# Patient Record
Sex: FEMALE | Race: WHITE | NOT HISPANIC OR LATINO | Employment: STUDENT | URBAN - METROPOLITAN AREA
[De-identification: names, ages, dates, MRNs, and addresses within clinical notes are randomized per-mention and may not be internally consistent; named-entity substitution may affect disease eponyms.]

---
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
## 2020-01-07 ENCOUNTER — OFFICE VISIT - HEALTHEAST (OUTPATIENT)
Dept: FAMILY MEDICINE | Facility: CLINIC | Age: 20
End: 2020-01-07

## 2020-01-07 DIAGNOSIS — Z30.017 ENCOUNTER FOR INITIAL PRESCRIPTION OF IMPLANTABLE SUBDERMAL CONTRACEPTIVE: ICD-10-CM

## 2020-01-08 ENCOUNTER — OFFICE VISIT - HEALTHEAST (OUTPATIENT)
Dept: FAMILY MEDICINE | Facility: CLINIC | Age: 20
End: 2020-01-08

## 2020-01-08 DIAGNOSIS — Z30.017 NEXPLANON INSERTION: ICD-10-CM

## 2020-01-08 LAB — HCG UR QL: NEGATIVE

## 2020-03-23 ENCOUNTER — VIRTUAL VISIT (OUTPATIENT)
Dept: FAMILY MEDICINE | Facility: OTHER | Age: 20
End: 2020-03-23

## 2020-03-23 NOTE — PROGRESS NOTES
"Date: 2020 10:54:13  Clinician: Yomi Gould  Clinician NPI: [id number]  Patient: Na De León  Patient : 2000  Patient Address: [address] [place], [place] [id number]  Patient Phone: [phone]  Visit Protocol: URI  Patient Summary:  Na is a 19 year old ( : 2000 ) female who initiated a Visit for cold, sinus infection, or influenza. When asked the question \"Please sign me up to receive news, health information and promotions from [employer].\", Na responded \"Yes\".    Na states her symptoms started suddenly 2-3 weeks ago. After her symptoms started, they improved and then got worse again.   Her symptoms consist of enlarged lymph nodes, a sore throat, a cough, malaise, and a headache.   Symptom details     Cough: Na coughs every 5-10 minutes and her cough is not more bothersome at night. Phlegm comes into her throat when she coughs. She does not believe her cough is caused by post-nasal drip. The color of the phlegm is clear.     Sore throat: Na reports having moderate throat pain (4-6 on a 10 point pain scale), does not have exudate on her tonsils, and can swallow liquids. The lymph nodes in her neck are enlarged. A rash has not appeared on the skin since the sore throat started.     Headache: She states the headache is moderate (4-6 on a 10 point pain scale).      Na denies having ear pain, rhinitis, facial pain or pressure, myalgias, wheezing, nasal congestion, chills, teeth pain, and fever. She also denies having a sinus infection within the past year, taking antibiotic medication for the symptoms, and having recent facial or sinus surgery in the past 60 days. She is not experiencing dyspnea.   Precipitating events  Na is not sure if she has been exposed to someone with strep throat. She has not recently been exposed to someone with influenza. Na has been in close contact with the following high risk individuals: immunocompromised people.   Pertinent " COVID-19 (Coronavirus) information  Na has not traveled internationally or to the areas where COVID-19 (Coronavirus) is widespread, including cruise ship travel in the last 14 days before the start of her symptoms.   Na has not had a close contact with a laboratory-confirmed COVID-19 patient within 14 days of symptom onset. She also has not had a close contact with a suspected COVID-19 patient within 14 days of symptom onset.   Na is not a healthcare worker and does not work in a healthcare facility.   Pertinent medical history  Na does not get yeast infections when she takes antibiotics.   Na does not need a return to work/school note.   Weight: 120 lbs   Na does not smoke or use smokeless tobacco.   She denies pregnancy and denies breastfeeding. She does not menstruate.   Weight: 120 lbs    MEDICATIONS: spironolactone-hydrochlorothiazide oral, butalbital-acetaminophen-caffeine-codeine oral, ALLERGIES: NKDA  Clinician Response:  Dear Na,   Based on the information you have provided, you do have symptoms that are consistent with Coronavirus (COVID-19).   The coronavirus causes mild to severe respiratory illness with the most common symptoms including fever, cough and difficulty breathing. Unfortunately, many viruses cause similar symptoms and it can be difficult to distinguish between viruses, especially in mild cases, so we are presuming that anyone with cough or fever has coronavirus at this time.  Coronavirus/COVID-19 has reached the point of community spread in [place], meaning that we are finding the virus in people with no known exposure risk for lina the virus. Given the increasing commonness of coronavirus in the community we are no longer testing patients who are not critically ill.  If you are a health care worker, you should refer to your employee health office for instructions about testing and returning to work.  For everyone else who has cough or fever, you should  assume you are infected with coronavirus. Since you will not be tested but have symptoms that may be consistent with coronavirus, the CDC recommends you stay in self-isolation until these three things have happened:    You have had no fever for at least 72 hours (that is three full days of no fever without the use of medicine that reduces fevers)    AND   Other symptoms have improved (for example, when your cough or shortness of breath have improved)   AND   At least 7 days have passed since your symptoms first appeared.   How to Isolate:    Isolate yourself at home.   Do Not allow any visitors  Do Not go to work or school  Do Not go to [employer],  centers, shopping, or other public places.  Do Not shake hands.  Avoid close contact with others (hugging, kissing).   Protect Others:    Cover Your Mouth and Nose with a mask, disposable tissue or wash cloth to avoid spreading germs to others.  Wash your hands and face frequently with soap and water.   Managing Symptoms:    At this time, we primarily recommend Tylenol (Acetaminophen) for fever or pain. If you have liver or kidney problems, contact your primary care provider for instructions on use of tylenol. Adults can take 650 mg (two 325 mg pills) by mouth every 4-6 hours as needed OR 1,000 mg (two 500 mg pills) every 8 hours as needed. MAXIMUM DAILY DOSE: 3,000mg. For children, refer to dosing on bottle based on age or weight.   If you develop significant shortness of breath that prevents you from doing normal activities, please call 911 or proceed to the nearest emergency room and alert them immediately that you have been in self-isolation for possible coronavirus.   For more information about COVID19 and options for caring for yourself at home, please visit the CDC website at https://www.cdc.gov/coronavirus/2019-ncov/about/steps-when-sick.htmlFor more options for care at [facility], please visit our website at  [email or web address]     Diagnosis: Cough  Diagnosis ICD: R05

## 2020-04-13 ENCOUNTER — COMMUNICATION - HEALTHEAST (OUTPATIENT)
Dept: PEDIATRICS | Facility: CLINIC | Age: 20
End: 2020-04-13

## 2020-04-17 ENCOUNTER — OFFICE VISIT - HEALTHEAST (OUTPATIENT)
Dept: PEDIATRICS | Facility: CLINIC | Age: 20
End: 2020-04-17

## 2020-04-17 DIAGNOSIS — R05.9 COUGH: ICD-10-CM

## 2020-04-30 ENCOUNTER — OFFICE VISIT - HEALTHEAST (OUTPATIENT)
Dept: PEDIATRICS | Facility: CLINIC | Age: 20
End: 2020-04-30

## 2020-04-30 ENCOUNTER — COMMUNICATION - HEALTHEAST (OUTPATIENT)
Dept: SCHEDULING | Facility: CLINIC | Age: 20
End: 2020-04-30

## 2020-04-30 DIAGNOSIS — R09.81 NASAL CONGESTION: ICD-10-CM

## 2020-04-30 DIAGNOSIS — R05.3 PERSISTENT COUGH IN PEDIATRIC PATIENT: ICD-10-CM

## 2020-05-11 ENCOUNTER — OFFICE VISIT - HEALTHEAST (OUTPATIENT)
Dept: FAMILY MEDICINE | Facility: CLINIC | Age: 20
End: 2020-05-11

## 2020-05-11 DIAGNOSIS — G43.009 MIGRAINE WITHOUT AURA AND WITHOUT STATUS MIGRAINOSUS, NOT INTRACTABLE: ICD-10-CM

## 2020-05-15 ENCOUNTER — COMMUNICATION - HEALTHEAST (OUTPATIENT)
Dept: PEDIATRICS | Facility: CLINIC | Age: 20
End: 2020-05-15

## 2020-05-15 DIAGNOSIS — Z13.9 SCREENING FOR CONDITION: ICD-10-CM

## 2020-05-18 ENCOUNTER — AMBULATORY - HEALTHEAST (OUTPATIENT)
Dept: LAB | Facility: CLINIC | Age: 20
End: 2020-05-18

## 2020-05-18 DIAGNOSIS — Z13.9 SCREENING FOR CONDITION: ICD-10-CM

## 2020-05-22 LAB
GAMMA INTERFERON BACKGROUND BLD IA-ACNC: 0.04 IU/ML
M TB IFN-G BLD-IMP: NEGATIVE
MITOGEN IGNF BCKGRD COR BLD-ACNC: 0 IU/ML
MITOGEN IGNF BCKGRD COR BLD-ACNC: 0.02 IU/ML
QTF INTERPRETATION: NORMAL
QTF MITOGEN - NIL: 6.97 IU/ML

## 2020-07-06 ENCOUNTER — OFFICE VISIT - HEALTHEAST (OUTPATIENT)
Dept: FAMILY MEDICINE | Facility: CLINIC | Age: 20
End: 2020-07-06

## 2020-07-06 DIAGNOSIS — Z00.00 HEALTHCARE MAINTENANCE: ICD-10-CM

## 2020-07-06 DIAGNOSIS — Z11.3 ROUTINE SCREENING FOR STI (SEXUALLY TRANSMITTED INFECTION): ICD-10-CM

## 2020-07-06 DIAGNOSIS — Z79.899 MEDICATION MANAGEMENT: ICD-10-CM

## 2020-07-06 LAB
ALBUMIN SERPL-MCNC: 4.1 G/DL (ref 3.5–5)
ALP SERPL-CCNC: 46 U/L (ref 45–120)
ALT SERPL W P-5'-P-CCNC: 23 U/L (ref 0–45)
ANION GAP SERPL CALCULATED.3IONS-SCNC: 11 MMOL/L (ref 5–18)
AST SERPL W P-5'-P-CCNC: 25 U/L (ref 0–40)
BILIRUB SERPL-MCNC: 0.5 MG/DL (ref 0–1)
BUN SERPL-MCNC: 10 MG/DL (ref 8–22)
CALCIUM SERPL-MCNC: 9.5 MG/DL (ref 8.5–10.5)
CHLORIDE BLD-SCNC: 107 MMOL/L (ref 98–107)
CO2 SERPL-SCNC: 23 MMOL/L (ref 22–31)
CREAT SERPL-MCNC: 0.89 MG/DL (ref 0.6–1.1)
GFR SERPL CREATININE-BSD FRML MDRD: >60 ML/MIN/1.73M2
GLUCOSE BLD-MCNC: 90 MG/DL (ref 70–125)
HGB BLD-MCNC: 13.7 G/DL (ref 12–16)
HIV 1+2 AB+HIV1 P24 AG SERPL QL IA: NEGATIVE
POTASSIUM BLD-SCNC: 4.4 MMOL/L (ref 3.5–5)
PROT SERPL-MCNC: 6.6 G/DL (ref 6–8)
SODIUM SERPL-SCNC: 141 MMOL/L (ref 136–145)

## 2020-07-06 ASSESSMENT — MIFFLIN-ST. JEOR: SCORE: 1309.52

## 2020-07-07 LAB — T PALLIDUM AB SER QL: NEGATIVE

## 2020-07-31 ENCOUNTER — COMMUNICATION - HEALTHEAST (OUTPATIENT)
Dept: PEDIATRICS | Facility: CLINIC | Age: 20
End: 2020-07-31

## 2020-09-24 ENCOUNTER — OFFICE VISIT - HEALTHEAST (OUTPATIENT)
Dept: FAMILY MEDICINE | Facility: CLINIC | Age: 20
End: 2020-09-24

## 2020-09-24 DIAGNOSIS — F41.9 ANXIETY: ICD-10-CM

## 2020-09-24 ASSESSMENT — ANXIETY QUESTIONNAIRES
2. NOT BEING ABLE TO STOP OR CONTROL WORRYING: SEVERAL DAYS
3. WORRYING TOO MUCH ABOUT DIFFERENT THINGS: SEVERAL DAYS
7. FEELING AFRAID AS IF SOMETHING AWFUL MIGHT HAPPEN: NOT AT ALL
6. BECOMING EASILY ANNOYED OR IRRITABLE: SEVERAL DAYS
4. TROUBLE RELAXING: MORE THAN HALF THE DAYS
5. BEING SO RESTLESS THAT IT IS HARD TO SIT STILL: NOT AT ALL
1. FEELING NERVOUS, ANXIOUS, OR ON EDGE: NEARLY EVERY DAY
GAD7 TOTAL SCORE: 8

## 2020-09-24 ASSESSMENT — PATIENT HEALTH QUESTIONNAIRE - PHQ9: SUM OF ALL RESPONSES TO PHQ QUESTIONS 1-9: 1

## 2020-09-25 ENCOUNTER — COMMUNICATION - HEALTHEAST (OUTPATIENT)
Dept: FAMILY MEDICINE | Facility: CLINIC | Age: 20
End: 2020-09-25

## 2020-10-06 ENCOUNTER — OFFICE VISIT - HEALTHEAST (OUTPATIENT)
Dept: FAMILY MEDICINE | Facility: CLINIC | Age: 20
End: 2020-10-06

## 2020-10-06 DIAGNOSIS — F41.9 ANXIETY: ICD-10-CM

## 2020-10-09 ENCOUNTER — COMMUNICATION - HEALTHEAST (OUTPATIENT)
Dept: FAMILY MEDICINE | Facility: CLINIC | Age: 20
End: 2020-10-09

## 2020-10-09 ENCOUNTER — AMBULATORY - HEALTHEAST (OUTPATIENT)
Dept: FAMILY MEDICINE | Facility: CLINIC | Age: 20
End: 2020-10-09

## 2020-10-09 DIAGNOSIS — R11.0 NAUSEA: ICD-10-CM

## 2020-10-09 RX ORDER — ONDANSETRON 4 MG/1
4 TABLET, ORALLY DISINTEGRATING ORAL EVERY 8 HOURS PRN
Qty: 30 TABLET | Refills: 0 | Status: SHIPPED | OUTPATIENT
Start: 2020-10-09 | End: 2022-03-02

## 2020-10-29 ENCOUNTER — COMMUNICATION - HEALTHEAST (OUTPATIENT)
Dept: FAMILY MEDICINE | Facility: CLINIC | Age: 20
End: 2020-10-29

## 2020-11-11 ENCOUNTER — COMMUNICATION - HEALTHEAST (OUTPATIENT)
Dept: FAMILY MEDICINE | Facility: CLINIC | Age: 20
End: 2020-11-11

## 2021-02-26 ENCOUNTER — COMMUNICATION - HEALTHEAST (OUTPATIENT)
Dept: FAMILY MEDICINE | Facility: CLINIC | Age: 21
End: 2021-02-26

## 2021-03-09 ENCOUNTER — OFFICE VISIT - HEALTHEAST (OUTPATIENT)
Dept: FAMILY MEDICINE | Facility: CLINIC | Age: 21
End: 2021-03-09

## 2021-03-09 DIAGNOSIS — R45.89 SYMPTOMS OF DEPRESSION: ICD-10-CM

## 2021-03-09 DIAGNOSIS — F41.9 ANXIETY: ICD-10-CM

## 2021-03-09 ASSESSMENT — ANXIETY QUESTIONNAIRES
7. FEELING AFRAID AS IF SOMETHING AWFUL MIGHT HAPPEN: NOT AT ALL
1. FEELING NERVOUS, ANXIOUS, OR ON EDGE: NOT AT ALL
4. TROUBLE RELAXING: NOT AT ALL
5. BEING SO RESTLESS THAT IT IS HARD TO SIT STILL: NOT AT ALL
6. BECOMING EASILY ANNOYED OR IRRITABLE: NOT AT ALL
GAD7 TOTAL SCORE: 1
3. WORRYING TOO MUCH ABOUT DIFFERENT THINGS: NOT AT ALL
2. NOT BEING ABLE TO STOP OR CONTROL WORRYING: SEVERAL DAYS

## 2021-03-09 ASSESSMENT — PATIENT HEALTH QUESTIONNAIRE - PHQ9: SUM OF ALL RESPONSES TO PHQ QUESTIONS 1-9: 5

## 2021-03-22 ENCOUNTER — COMMUNICATION - HEALTHEAST (OUTPATIENT)
Dept: PEDIATRICS | Facility: CLINIC | Age: 21
End: 2021-03-22

## 2021-03-22 DIAGNOSIS — G44.209 TENSION-TYPE HEADACHE, NOT INTRACTABLE, UNSPECIFIED CHRONICITY PATTERN: ICD-10-CM

## 2021-03-23 RX ORDER — BUTALBITAL, ACETAMINOPHEN AND CAFFEINE 50; 325; 40 MG/1; MG/1; MG/1
TABLET ORAL
Qty: 60 TABLET | Refills: 1 | Status: SHIPPED | OUTPATIENT
Start: 2021-03-23 | End: 2022-01-08

## 2021-03-31 ENCOUNTER — COMMUNICATION - HEALTHEAST (OUTPATIENT)
Dept: FAMILY MEDICINE | Facility: CLINIC | Age: 21
End: 2021-03-31

## 2021-03-31 ENCOUNTER — RECORDS - HEALTHEAST (OUTPATIENT)
Dept: ADMINISTRATIVE | Facility: OTHER | Age: 21
End: 2021-03-31

## 2021-03-31 DIAGNOSIS — F41.9 ANXIETY: ICD-10-CM

## 2021-03-31 DIAGNOSIS — R45.89 SYMPTOMS OF DEPRESSION: ICD-10-CM

## 2021-04-12 ENCOUNTER — COMMUNICATION - HEALTHEAST (OUTPATIENT)
Dept: FAMILY MEDICINE | Facility: CLINIC | Age: 21
End: 2021-04-12

## 2021-04-12 ENCOUNTER — OFFICE VISIT - HEALTHEAST (OUTPATIENT)
Dept: FAMILY MEDICINE | Facility: CLINIC | Age: 21
End: 2021-04-12

## 2021-04-12 DIAGNOSIS — F41.9 ANXIETY: ICD-10-CM

## 2021-04-12 DIAGNOSIS — R45.89 SYMPTOMS OF DEPRESSION: ICD-10-CM

## 2021-04-12 RX ORDER — AMITRIPTYLINE HYDROCHLORIDE 10 MG/1
TABLET ORAL
Status: SHIPPED | COMMUNITY
Start: 2021-04-06 | End: 2022-03-02

## 2021-04-13 RX ORDER — BUPROPION HYDROCHLORIDE 150 MG/1
150 TABLET ORAL DAILY
Qty: 90 TABLET | Refills: 3 | Status: SHIPPED | OUTPATIENT
Start: 2021-04-13 | End: 2022-12-12

## 2021-04-19 ENCOUNTER — RECORDS - HEALTHEAST (OUTPATIENT)
Dept: ADMINISTRATIVE | Facility: OTHER | Age: 21
End: 2021-04-19

## 2021-05-27 ASSESSMENT — PATIENT HEALTH QUESTIONNAIRE - PHQ9
SUM OF ALL RESPONSES TO PHQ QUESTIONS 1-9: 5
SUM OF ALL RESPONSES TO PHQ QUESTIONS 1-9: 1

## 2021-05-27 NOTE — TELEPHONE ENCOUNTER
I will put in referral, refill original birth control medication.  Please look at previous note and call MOM for making an appt (or give her phone numbers to make an appt for her daughter). Erum Brewster MD 4/9/2019 1:01 PM

## 2021-05-27 NOTE — TELEPHONE ENCOUNTER
Patient Returning Call  Reason for call:  Alexander - mom   Information relayed to patient: below message relayed to ALEXANDER.  Alexander agree's with MD regarding gynecology appointment. Please place referral & call MOM to schedule appointment.   Patient has additional questions:  Yes  If YES, what are your questions/concerns: Patient requesting to go back on 1st birth control pill that she was given in April of 2018 (pt had less side effects)    questions if this is why patient had increased headaches ? Please call mom and advise of headaches, appointment and BC refill .   Okay to leave a detailed message ?:  Yes

## 2021-05-27 NOTE — TELEPHONE ENCOUNTER
Question following Office Visit  When did you see your provider: 2/1/2019  What is your question: Mother states patient is still having her menses 2 times a month and headaches every day.  Mother would like to know if her daughter needs to see a gyn provider and if so who would Dr Brewster recommend?  Please call and advise.  Okay to leave a detailed message: Yes

## 2021-05-27 NOTE — TELEPHONE ENCOUNTER
Yes, I do recommend working with gynecology for Na.  I do not have specific providers that I refer to for Na's age; I find that at clinics such as [facility] and [facility] that when making an appt to request to have appt with a provider who works frequently with adolescents can help make sure she has an appt with someone who is used to working with late teenagers.      Erum Brewster MD 4/8/2019 6:11 PM

## 2021-05-28 ASSESSMENT — ANXIETY QUESTIONNAIRES
GAD7 TOTAL SCORE: 8
GAD7 TOTAL SCORE: 1

## 2021-05-29 NOTE — TELEPHONE ENCOUNTER
Who is calling:  Maggie, Patient's Mom  Reason for Call:  Patient needs a copy of the lab result done 6/7/19 for college.  Please leave a copy of this at the .  Either Na or Maggie will pick it up this afternoon or tomorrow morning.    Date of last appointment with primary care: 6/7/19  Okay to leave a detailed message: Yes

## 2021-05-29 NOTE — PROGRESS NOTES
ASSESSMENT:  1. Sports physical  - Sickle Cell Screen    IN need of sports physical for particpation in soccer at [employer].     PLAN:  Reviewed [employer] paperwork and filled out today, as well as discussing sickle cell screen.  She has a normal cardiac examination without history of shortness of breath with running, chest pain, near syncopal episodes.  Medical clearance for strenous physical activity given today.  Regarding her menstrual irregularities, recommend follow up with gynecologist that has Rx her most recent OCP.     There are no Patient Instructions on file for this visit.    Orders Placed This Encounter   Procedures     Sickle Cell Screen     Medications Discontinued During This Encounter   Medication Reason     norethindrone-ethinyl estradiol (JUNEL FE 1/20, 28,) 1 mg-20 mcg (21)/75 mg (7) per tablet Therapy completed       No follow-ups on file.    CHIEF COMPLAINT:  Chief Complaint   Patient presents with     Forms Request     needs forms filled out for college       HISTORY OF PRESENT ILLNESS:  Na is a 18 y.o. female presenting to the clinic today with mom for paperwork to be filled out prior to initiation of strenous work out activity at [employer] for womens soccer.  Na has questions about sickle cell screening and if it should be done.   Overall, Na has had a good end of her senior year.  She is now on spironolactone and OCP through gynecology to help decrease menstrual cramping and help with acne.  She has not had her period for 2 months. She denies sexual activity/ intercourse.   She denies chest pain or shortness of breath with activity.  She does not have near syncopal episodes. She has not had syncope with physical activity.     REVIEW OF SYSTEMS:    All other systems are negative.    PFSH:    History reviewed. No pertinent past medical history.    Family History   Problem Relation Age of Onset     Hyperlipidemia Father      Basal cell carcinoma Mother      Anxiety disorder  "Sister      COPD Maternal Grandmother      Rheum arthritis Maternal Grandmother      Hypertension Maternal Grandmother      Depression Maternal Grandfather      Alzheimer's disease Maternal Grandfather      Prostate cancer Maternal Grandfather      Pacemaker Paternal Grandmother      Anxiety disorder Sister      No Medical Problems Sister        History reviewed. No pertinent surgical history.        TOBACCO USE:  Social History     Tobacco Use   Smoking Status Never Smoker   Smokeless Tobacco Never Used       VITALS:  Vitals:    06/07/19 1336   BP: 100/64   Patient Site: Left Arm   Patient Position: Sitting   Cuff Size: Adult Small   Pulse: 87   Temp: 98.1  F (36.7  C)   TempSrc: Oral   Weight: 126 lb 9.6 oz (57.4 kg)   Height: 5' 3.5\" (1.613 m)     Wt Readings from Last 3 Encounters:   06/07/19 126 lb 9.6 oz (57.4 kg) (52 %, Z= 0.05)*   02/01/19 126 lb (57.2 kg) (53 %, Z= 0.06)*   11/09/18 124 lb 11.2 oz (56.6 kg) (51 %, Z= 0.03)*     * Growth percentiles are based on CDC (Girls, 2-20 Years) data.     Body mass index is 22.07 kg/m .    PHYSICAL EXAM:  General: Alert, no acute distress.   Eyes: PERRL, EOMI, Conjunctivae clear.  Ears: TMs are without erythema, pus or fluid. Position and landmarks are normal.    Nose: Clear.    Throat: Oropharynx is moist and clear, without tonsillar hypertrophy, asymmetry, exudate or lesions.  Neck: Supple without lymphadenopathy or tenderness. No thyromegaly or nodules.  Lungs: Clear to auscultation bilaterally. No wheezes, rhonchi, or rales. No prolongation of expiratory phase. No tachypnea, retractions, or increased work of breathing.  Cardiac: Regular rate and rhythm, no murmur audible.  Abdomen: Soft, nontender, nondistended. Bowel sounds present. No hepatosplenomegaly or mass palpable.    Office Visit on 06/07/2019   Component Date Value Ref Range Status     Sickle Cell Screen 06/07/2019 Negative  Negative Final       "

## 2021-05-31 VITALS — BODY MASS INDEX: 21.42 KG/M2 | WEIGHT: 120.9 LBS | HEIGHT: 63 IN

## 2021-05-31 VITALS — BODY MASS INDEX: 21.61 KG/M2 | WEIGHT: 122 LBS

## 2021-05-31 VITALS — WEIGHT: 120.8 LBS

## 2021-05-31 NOTE — PATIENT INSTRUCTIONS - HE
Prescription for water: start with 60-80 oz per day; goal of 80-100oz per day.    Recommend with using a water bottle to keep track.

## 2021-05-31 NOTE — TELEPHONE ENCOUNTER
Who is calling:  Patient's motherMaggie  Reason for Call:  She is asking if Dr Brewster would consider starting the patient on:    butalbital-acetaminophen-caffeine (FIORICET, ESGIC) -40 mg per tablet    She states the patient has taken some of the callers prescription in the past and it has worked well for her headaches. Caller is aware that Dr Brewster is out of the clinic until 8/12/2019 and will await her response.     She would also like it noted that the patient will be leaving for [facility] on 8/17/2019.  Date of last appointment with primary care: 6/07/2019  Okay to leave a detailed message: Yes

## 2021-05-31 NOTE — PROGRESS NOTES
ASSESSMENT:  1. Tension-type headache, not intractable, unspecified chronicity pattern    - butalbital-acetaminophen-caffeine (FIORICET, ESGIC) -40 mg per tablet; Take 1 tablet by mouth every 4 (four) hours as needed for pain.  Dispense: 60 tablet; Refill: 1    Na has frequent tension headaches, without migraine features.      PLAN:   Reviewed / discussed headache prevention. Reinforced increased hydration.  Na is an college level  and will be playing for [employer] this fall.  She needs to increase her water intake daily as primary prevention for headaches.   We also discussed headaches can be more prominent in those people with anxiety symptoms, continue to monitor.  Will Rx medication that she has taken previously for tension headaches.  If headache features worsen, to follow up in clinic again.         Patient Instructions   Prescription for water: start with 60-80 oz per day; goal of 80-100oz per day.    Recommend with using a water bottle to keep track.        Medications Discontinued During This Encounter   Medication Reason     norgestimate-ethinyl estradiol (TRI-ESTARYLLA) 0.18/0.215/0.25 mg-35 mcg (28) Tab tablet        No follow-ups on file.    CHIEF COMPLAINT:  Chief Complaint   Patient presents with     Headache     getting more often-has had one everyday since your new birth control.       HISTORY OF PRESENT ILLNESS:  Na is a 18 y.o. female presenting to the clinic today with her mother for discussed on frequent headaches.  This has been discussed between myself and Na in the past, most recently at visit on 2/1/19 for her yearly physical.   At that time advised to increase amount of water she drinks daily.  She was also with dsymenorrhea and was started on OCPs.  She had difficult time tolerating OCP, and was evaluated by GYN and changed to Junel FE OCP. She states that her premenstrual symptoms and cramping are somewhat improved.  There has been no change to  her headaches however.   She gets headaches most days of the week. There are located in the temporal region and above her eyebrows.  Taking Tylenol extra strength can help sometimes but not always. She has tried her mother's medication that has been prescribed for headaches and it works very well. Medication brought today in Rx bottle- it is butalbital-acetaminophen-caffeine.    Typically she will get headaches at least once a day, most days of the week, but when travelling in [place] this summer it was more frequent.  She takes acetaminophen 3-4 times per week for pain reduction. She doesn't drink caffienated beverages. She denies allergy symptoms   Her headaches can be worse after exercise as well as after eating.  She exercises with cardio training about 30 minutes per day.  She is wanting to get into cardio shape for upcoming soccer season.   She does have anxious feelings for the start of school year  She is excited to be on soccer team and enjoys playing soccer.     REVIEW OF SYSTEMS:   There has been no fever illness this summer.  All other systems are negative.    PFSH:  Dsymenorrhea- on OCP.    History reviewed. No pertinent past medical history.    Family History   Problem Relation Age of Onset     Hyperlipidemia Father      Basal cell carcinoma Mother      Anxiety disorder Sister      COPD Maternal Grandmother      Rheum arthritis Maternal Grandmother      Hypertension Maternal Grandmother      Depression Maternal Grandfather      Alzheimer's disease Maternal Grandfather      Prostate cancer Maternal Grandfather      Pacemaker Paternal Grandmother      Anxiety disorder Sister      No Medical Problems Sister        History reviewed. No pertinent surgical history.        TOBACCO USE:  Social History     Tobacco Use   Smoking Status Never Smoker   Smokeless Tobacco Never Used       VITALS:  Vitals:    08/15/19 0830   BP: 90/66   Patient Site: Left Arm   Patient Position: Sitting   Cuff Size: Adult Regular  "  Pulse: 88   Temp: 97.6  F (36.4  C)   Weight: 128 lb (58.1 kg)   Height: 5' 3.5\" (1.613 m)     Wt Readings from Last 3 Encounters:   08/15/19 128 lb (58.1 kg) (54 %, Z= 0.09)*   06/07/19 126 lb 9.6 oz (57.4 kg) (52 %, Z= 0.05)*   02/01/19 126 lb (57.2 kg) (53 %, Z= 0.06)*     * Growth percentiles are based on CDC (Girls, 2-20 Years) data.     Body mass index is 22.32 kg/m .    PHYSICAL EXAM:  General: Alert, no acute distress.   Eyes: PERRL, EOMI, Conjunctivae clear. Undilated fundoscopic exam is normal.   Ears: TMs are without erythema, pus or fluid. Position and landmarks are normal.    Nose: Clear.    Throat: Oropharynx is moist and clear, without tonsillar hypertrophy, asymmetry, exudate or lesions.  Neck: Supple without lymphadenopathy or tenderness. No thyromegaly or nodules.  Lungs: Clear to auscultation bilaterally. No wheezes, rhonchi, or rales. No prolongation of expiratory phase. No tachypnea, retractions, or increased work of breathing.  Cardiac: Regular rate and rhythm, no murmur audible.  Neuro: CN 2-12 intact. Patellar reflexes + 2 and equal bilaterally.       "

## 2021-05-31 NOTE — TELEPHONE ENCOUNTER
In review of her chart, I see that we have discussed headaches and management before.   I do not think that medication is the best for her headaches however, and while it may help in the short term I do not think its the best solution for headaches.   I recommend that Na be seen by a provider (myself if possible, not sure when she Na is available between now and school starting) to discuss different therapy options, evaluation of headache triggers/ treatments.     Erum Brewster MD 8/12/2019 5:07 PM

## 2021-06-01 VITALS — WEIGHT: 127.9 LBS | HEIGHT: 63 IN | BODY MASS INDEX: 22.66 KG/M2

## 2021-06-01 VITALS — WEIGHT: 118.3 LBS | BODY MASS INDEX: 20.96 KG/M2

## 2021-06-02 VITALS — BODY MASS INDEX: 22.09 KG/M2 | WEIGHT: 124.7 LBS | HEIGHT: 63 IN

## 2021-06-02 VITALS — WEIGHT: 126 LBS | HEIGHT: 63 IN | BODY MASS INDEX: 22.32 KG/M2

## 2021-06-03 VITALS — BODY MASS INDEX: 21.61 KG/M2 | WEIGHT: 126.6 LBS | HEIGHT: 64 IN

## 2021-06-03 VITALS — WEIGHT: 128 LBS | BODY MASS INDEX: 21.85 KG/M2 | HEIGHT: 64 IN

## 2021-06-04 VITALS
OXYGEN SATURATION: 98 % | BODY MASS INDEX: 21.17 KG/M2 | HEIGHT: 64 IN | TEMPERATURE: 98.7 F | HEART RATE: 90 BPM | WEIGHT: 124 LBS | SYSTOLIC BLOOD PRESSURE: 90 MMHG | DIASTOLIC BLOOD PRESSURE: 64 MMHG

## 2021-06-04 VITALS
HEART RATE: 86 BPM | BODY MASS INDEX: 21.27 KG/M2 | DIASTOLIC BLOOD PRESSURE: 62 MMHG | WEIGHT: 122 LBS | SYSTOLIC BLOOD PRESSURE: 108 MMHG | OXYGEN SATURATION: 96 %

## 2021-06-04 VITALS
SYSTOLIC BLOOD PRESSURE: 100 MMHG | WEIGHT: 120 LBS | HEART RATE: 74 BPM | OXYGEN SATURATION: 99 % | DIASTOLIC BLOOD PRESSURE: 64 MMHG | TEMPERATURE: 97.6 F | BODY MASS INDEX: 20.92 KG/M2

## 2021-06-05 VITALS
BODY MASS INDEX: 21.27 KG/M2 | SYSTOLIC BLOOD PRESSURE: 100 MMHG | HEART RATE: 83 BPM | DIASTOLIC BLOOD PRESSURE: 62 MMHG | WEIGHT: 122 LBS | OXYGEN SATURATION: 97 %

## 2021-06-05 NOTE — PROGRESS NOTES
aN De León is a 19 y.o. female who is here for Nexplanon insertion.  She was seen yesterday for a consultation.  She was given patient handouts at that time.    Vitals:    01/08/20 1010   BP: 100/64   Pulse: 74   Temp: 97.6  F (36.4  C)   SpO2: 99%      Procedure note:  Risk, benefits, and procedure were reviewed with the patient.  Alternatives were also offered.  Patient agreed to go ahead with the procedure.  Procedure techniques were reviewed with the patient.  Informed consent was obtained.  Urine HCG was negative in the clinic.  After cleansing the non-dominant arm above the elbow, 2cc of 1% lidocaine was administered.  Nexplanon was then inserted without difficulty.  Patient tolerated the procedure.  Palpation revealed subdermal placement.  Patient was able to feel the implant as well.  Bleeding was minimal and a pressure dressing was applied with instructions to leave this in place for 24-48 hours.  Patient was instructed to observe for signs and symptoms of any infection (localized tenderness, pain, inflammation) or excessive bruising.

## 2021-06-05 NOTE — PROGRESS NOTES
ASSESSMENT/PLAN:    Encounter for initial prescription of implantable subdermal contraceptive  Spoke about various forms of birth control from oral, transdermal, intravaginal, intramuscular, subdermal, intrauterine.  Also spoke about hormonal versus nonhormonal form of birth control.  Patient verbalized interest in the nexplanon.  We spoke about the procedure, risks and benefit, and timing of insertion.  She will back for insertion of nexplanon.  Discussed safe sex and condom use.  Patient verbalized understanding and agree with plan     CHIEF COMPLAINT:  Chief Complaint   Patient presents with     Contraception     HISTORY OF PRESENT ILLNESS:  Na is a 19 y.o. female presenting to the clinic today to discuss contraception options. She is accompanied by her mother. She has been taking hormone pills for about 2 years. She started birth control to reduce the cramping with her menstrual cycle and to regulate her cycles. Her cramping has improved since she started the hormone pills. Her cycle is normally regular, however, recently she has been trying other hormone levels to minimize headaches. Her most recent hormone pills are Junel which she has been taking for about 8 months. She has not had a menstrual cycle since she started the Junel. She takes her hormone pills at every night. She is now in college and does not have a consistent schedule. She would like to explore other options for birth control due to her inconsistent schedule. Both of her sisters have the Nexplanon so she was interested in doing that as well. Her second choice would be the hormone patch. She took her hormone pill last night. She is on the last week of her pack.     REVIEW OF SYSTEMS:   Constitutional: Negative.   HENT: Negative.   Eyes: Negative.   Respiratory: Negative.   Cardiovascular: Negative.   Gastrointestinal: Negative.   Endocrine: Negative.   Genitourinary: Negative.   Musculoskeletal: Negative.   Skin: Negative.    Allergic/Immunologic: Negative.   Neurological: Negative.   Hematological: Negative.   Psychiatric/Behavioral: Negative.   All other systems are negative.    PSFH:  She goes back to school on Friday.     TOBACCO USE:  Social History     Tobacco Use   Smoking Status Never Smoker   Smokeless Tobacco Never Used     VITALS:  Vitals:    01/07/20 1454   BP: 108/62   Pulse: 86   SpO2: 96%   Weight: 122 lb (55.3 kg)     Wt Readings from Last 3 Encounters:   01/07/20 122 lb (55.3 kg) (40 %, Z= -0.25)*   08/15/19 128 lb (58.1 kg) (54 %, Z= 0.09)*   06/07/19 126 lb 9.6 oz (57.4 kg) (52 %, Z= 0.05)*     * Growth percentiles are based on [facility] (Girls, 2-20 Years) data.     PHYSICAL EXAM:  Constitutional: Patient is oriented to person, place, and time. Patient appears well-developed and well-nourished. No distress.   Head: Normocephalic and atraumatic.   Right Ear: External ear normal.   Left Ear: External ear normal.   Nose: Nose normal.   Eyes: Conjunctivae and EOM are normal. Right eye exhibits no discharge. Left eye exhibits no discharge. No scleral icterus.   Neurological: Patient is alert and oriented to person, place, and time. No cranial nerve deficit. Coordination normal.   Skin: No rash noted. Patient is not diaphoretic. No erythema. No pallor.    ADDITIONAL HISTORY SUMMARIZED (2): Reviewed note from 08/15/19 about tension-type headaches.   DECISION TO OBTAIN EXTRA INFORMATION (1): None.   RADIOLOGY TESTS (1): None.  LABS (1): None.  MEDICINE TESTS (1): None.  INDEPENDENT REVIEW (2 each): None.     The visit lasted a total of 25 minutes face to face with the patient. Over 50% of the time was spent counseling and educating the patient about contraception options.    IKaylin, am scribing for and in the presence of, Dr. Nicholas.    IDr. Nicholas, personally performed the services described in this documentation, as scribed by Kaylin Saravia in my presence, and it is both accurate and  complete.    MEDICATIONS:  Current Outpatient Medications   Medication Sig Dispense Refill     butalbital-acetaminophen-caffeine (FIORICET, ESGIC) -40 mg per tablet Take 1 tablet by mouth every 4 (four) hours as needed for pain. 60 tablet 1     JUNEL FE 1/20, 28, 1 mg-20 mcg (21)/75 mg (7) per tablet Take 1 tablet by mouth daily.  4     spironolactone (ALDACTONE) 100 MG tablet Take 100 mg by mouth every morning.  5     No current facility-administered medications for this visit.        Total data points:2

## 2021-06-07 NOTE — TELEPHONE ENCOUNTER
Na gave permission for mom to speak on her behalf. Na's had a sinus infection, but still has cough. She has been coughing for about 6-7 weeks. Mom would like additional treatment or antibiotic.    Patient's Mom transferred to HE  for a telephone visit for today. Mom verbalized care advice and telephone disposition.    Phyllis Stephens RN 4/30/2020 @ 3:49 pm    COVID 19 Nurse Triage Plan/Patient Instructions    Please be aware that novel coronavirus (COVID-19) may be circulating in the community. If you develop symptoms such as fever, cough, or SOB or if you have concerns about the presence of another infection including coronavirus (COVID-19), please contact your health care provider or visit [email or web address].     Disposition/Instructions    Patient to have an Urgent Care Telephone Visit with a provider. Follow System Ambulatory Workflow for COVID 19.     Urgent Care Telephone Visits are available between the hours of 8 am to 9 pm. Staff will assist patent in scheduling an appointment for this Urgent Care Telephone Visit.     Call Back If: Your symptoms worsen before you are able to complete your Urgent Care Telephone Visit with a provider.    Thank you for limiting contact with others, wearing a simple mask to cover your cough, practice good hand hygiene habits and accessing our virtual services where possible to limit the spread of this virus.    For more information about COVID19 and options for caring for yourself at home, please visit the CDC website at https://www.cdc.gov/coronavirus/2019-ncov/about/steps-when-sick.html  For more options for care at [facility], please visit our website at [email or web address]    For more information, please use the [place] Department of Health COVID-19 Website: https://www.health.state.[place]/diseases/coronavirus/index.html  [facility] ([facility] COVID-19 Hotlines (Interpreters available):      Health  "questions: Phone Number: [phone] or [phone] and Hours: 7 a.m. to 7 p.m.    Schools and  questions: Phone Number: [phone] or [phone] and Hours 7 a.m. to 7 p.m.    Reason for Disposition    Continuous (nonstop) coughing    Answer Assessment - Initial Assessment Questions  Note to Triager - Respiratory Distress: Always rule out respiratory distress (also known as working hard to breathe or shortness of breath). Listen for grunting, stridor, wheezing, tachypnea in these calls. How to assess: Listen to the child's breathing early in your assessment. Reason: What you hear is often more valid than the caller's answers to your triage questions.  1. ONSET: \"When did the cough start?\"       About 5-6 weeks ago  2. SEVERITY: \"How bad is the cough today?\"       Mild  3. COUGHING SPELLS: \"Does he go into coughing spells where he can't stop?\" If so, ask: \"How long do they last?\"       90% gone, but the cough is still gone  4. CROUP: \"Is it a barky, croupy cough?\"       NA  5. RESPIRATORY STATUS: \"Describe your child's breathing when he's not coughing. What does it sound like?\" (eg wheezing, stridor, grunting, weak cry, unable to speak, retractions, rapid rate, cyanosis)      Breathing is normal  6. CHILD'S APPEARANCE: \"How sick is your child acting?\" \" What is he doing right now?\" If asleep, ask: \"How was he acting before he went to sleep?\"       No fever, perfectly normal.  7. FEVER: \"Does your child have a fever?\" If so, ask: \"What is it, how was it measured, and when did it start?\"       No fever  8. CAUSE: \"What do you think is causing the cough?\" Age 6 months to 4 years, ask:  \"Could he have choked on something?\"      Sinus infection    Protocols used: COUGH-P-OH      "

## 2021-06-07 NOTE — TELEPHONE ENCOUNTER
FYI - Status Update  Who is Calling: Mom  Update: Caller stated that she has not got a return call regarding on what to do next because the patient still has a cough. Writer informed caller that Dr. Cummings suggested a phone/video visit. Caller stated that she was not informed about that message. Caller is in agreement and has no further questions. Caller transferred to scheduling.  Okay to leave a detailed message?:  No return call needed

## 2021-06-07 NOTE — TELEPHONE ENCOUNTER
Reached out to patient and left a message to return phone call. Please relay the below message when patient calls back. Thank you,   Renetta Falk

## 2021-06-07 NOTE — TELEPHONE ENCOUNTER
FYI - Status Update  Who is Calling: Patient/Mom  Update: Pt's mom calling in with Pt in the background calling because Pt is still experiencing coughing, and now stuffy nose.  Pt was told to contact [email or web address] 2 weeks ago, the response was to take tylenol, and cough medicine.  Pt's cough still not improving. Pt denies fever or any other symptom related to COVID-19.  Pharmacy on file. Please advise.  Okay to leave a detailed message?:  No

## 2021-06-07 NOTE — PROGRESS NOTES
"Na De León is a 19 y.o. female who is being evaluated via a billable video visit.      The patient has been notified of following:     \"This video visit will be conducted via a call between you and your physician/provider. We have found that certain health care needs can be provided without the need for an in-person physical exam.  This service lets us provide the care you need with a video conversation.  If a prescription is necessary we can send it directly to your pharmacy.  If lab work is needed we can place an order for that and you can then stop by our lab to have the test done at a later time.    Video visits are billed at different rates depending on your insurance coverage. Please reach out to your insurance provider with any questions.    If during the course of the call the physician/provider feels a video visit is not appropriate, you will not be charged for this service.\"    Patient has given verbal consent to a Video visit? Yes    Patient would like to receive their AVS by AVS Preference: Octavio.    Patient would like the video invitation sent by: Send to e-mail at: [email or web address]       Video Start Time: 1:59 pm    Additional provider notes:   Due to current Covid-19 pandemic, pt was offered a virtual visit in lieu of in office evaluation to limit unnecessary exposures.      Na and her mother participated in the video visit.  Na has had a cough for the past 5 weeks.  She has tried a number of OTC cough preparations without improvement.  She has also been using nasal fluticasone and taking loratadine for several days without benefit.  Her cough is productive of \"cloudy\" sputum without blood.  She has had no fevers.  She had a low grade tactile temp initially, along with a sore throat, both have since resolved.  She has had some paroxysms when supine only.  She acknowledges significant post nasal drip and bilateral paranasal and bifrontal \"pressure.\"    She developed new nasal " "congestion 1 week ago.    She required albuterol nebs as an infant, but not since.  No audible wheezes.  She reports sometimes feeling a \"bit short of breath,\" like her breathing is \"heavy.\"  PMH neg for pneumonia, pos for sinusitis X several times, last 12/19, treated with azithromycin.  Denies ill or Covid-19 contacts.    Na appears to be pink and in NAD.  No cough is heard during our visit.  No apparent tachypnea, speaking in full sentences.    Diagnoses and all orders for this visit:    Cough  -     azithromycin (ZITHROMAX) 250 MG tablet; Take 500 mg (2 x 250 mg tablets) on day 1 followed by 250 mg (1 tablet) on days 2-5.  Dispense: 6 tablet; Refill: 0  -     albuterol (VENTOLIN HFA) 90 mcg/actuation inhaler; Inhale 2 puffs every 4 (four) hours as needed for wheezing (or coughing).  Dispense: 16 g; Refill: 1    We discussed viral vs bacterial infections, sinusitis, pneumonia, Covid-19 signs and symptoms.  I recommended starting azithromycin as above.  If no improvement within several days, I suggested starting a trial of albuterol, 2 puffs every 4 hours while awake, weaning when cough is gone.  I also recommended a trial of saline sinus irrigation and mother volunteers that they have a Neti pot and we discussed the necessity for using distilled water.  Reviewed indications for going to ED over the weekend or after hours, and re-contact clinic with new or worsening symptoms.    Video-Visit Details    Type of service:  Video Visit    Video End Time (time video stopped): 2:20  pm    Originating Location (pt. Location): Home    Distant Location (provider location):  [facility]     Mode of Communication:  Video Conference via [facility]      Jone Umana MD  "

## 2021-06-07 NOTE — PATIENT INSTRUCTIONS - HE
We may have taken care of everything with prior antibiotic and you may just have a lingering cough over the next couple weeks and may improve on its own  However, there is a possibility that you may have still a slight sinus infection that requires treatment  Start Augmentin twice a day for 10 days  Recommend continuing your nose sprays and allergy medicine  May be helpful to try your albuterol again scheduled over the next couple days    Given your chronic cough, you may have some inflammation in your lungs that may benefit from a treatment of steroids.  Please take 1 steroid tablet daily for 3 days.  I have given you a couple extra in case you still have a cough at that time, and you can make it a total of 5 days if still not better.    If still no improvement despite all the above, we may need you to see an ear nose and throat doctor or come in for evaluation in the clinic.      Please let us know if you are having any persistent issues or worsening, new fevers, troubles breathing

## 2021-06-07 NOTE — TELEPHONE ENCOUNTER
I suggest scheduling a video/phone visit.  If she is experiencing acute symptoms, such as shortness of breath or chest pain, she should re-contact [email or web address]. Thanks.

## 2021-06-07 NOTE — PROGRESS NOTES
"Na De León is a 19 y.o. female who is being evaluated via a billable video visit.      The patient has been notified of following:     \"This video visit will be conducted via a call between you and your physician/provider. We have found that certain health care needs can be provided without the need for an in-person physical exam.  This service lets us provide the care you need with a video conversation.  If a prescription is necessary we can send it directly to your pharmacy.  If lab work is needed we can place an order for that and you can then stop by our lab to have the test done at a later time.    Video visits are billed at different rates depending on your insurance coverage. Please reach out to your insurance provider with any questions.    If during the course of the call the physician/provider feels a video visit is not appropriate, you will not be charged for this service.\"    Patient has given verbal consent to a Video visit? Yes    Patient would like to receive their AVS by AVS Preference: Mail a copy.    Patient would like the video invitation sent by: Send to e-mail at: [email or web address]    Will anyone else be joining your video visit? No        Video Start Time: 4:03 PM    Additional provider notes:     HISTORY OF PRESENT ILLNESS:  She has concerns about continuing sinus infection, and has had now 5-6 weeks of cough. She had a virtual visit on 4/17 with a provider due to the persistent cough, and was prescribed albuterol and azithromycin. She thinks that the azithromycin helped, and she is feeling 90% better but still has occasional coughing through the day and night. Continues with sinus pressure and congestion although this is a bit better. Continues with Flonase and oral antihistamine. She had some prior dyspnea prior to the antibiotic treatment but overall better now and no work of breathing. Albuterol was not helpful and she is no longer doing this, but only tried it a couple " times. No fevers.     REVIEW OF SYSTEMS:   All other systems are negative.    PFSH:  Reviewed, see EMR for full details. No significant changes.   Has a past history of somewhat frequent sinus infections  Has seasonal allergies but no prior dx of asthma  No other family members sick      PHYSICAL EXAM:  Limited by video visit, but observations outlined as below    General: Alert, well-appearing. No coughing  Eyes: sclera white, conjunctivae clear.   HEENT: moist mucous membranes. She sounds like she has nasal congestion  Respiratory: normal respiratory effort  CV: appears to have good perfusion  Abdomen: non distended  Skin: Warm, dry, no rashes      Kota Keith MD          ASSESSMENT and PLAN:  1. Persistent cough in pediatric patient  2. Nasal congestion  Overall I am reassured she has had improvement since her last check in and has no fevers or dyspnea. She has an ongoing cough, and we discussed that this could still be present as the body continues to recover and may get better on its own without any other intervention. Given her ongoing sinus complaints and her history of sinus infections, we agreed to trial one more course of antibiotics to cover sinus infection better with augmentin, and for possible bronchospasm, recommend trial of albuterol along with a 3-5 day burst of steroid which family was agreeable to. Continue antihistamine and flonase. May need ENT follow up if no improvement. We discussed notifying clinical team precautions and when to seek care urgently.  - predniSONE (DELTASONE) 50 MG tablet; Take 50mg (1 tablet) by mouth daily for 3 days. If no improvement in cough, please take for additional 2 days..  Dispense: 5 tablet; Refill: 0  - amoxicillin-clavulanate (AUGMENTIN) 875-125 mg per tablet; Take 1 tablet by mouth 2 (two) times a day for 10 days.  Dispense: 20 tablet; Refill: 0      Patient Instructions   We may have taken care of everything with prior antibiotic and you may just have a  lingering cough over the next couple weeks and may improve on its own  However, there is a possibility that you may have still a slight sinus infection that requires treatment  Start Augmentin twice a day for 10 days  Recommend continuing your nose sprays and allergy medicine  May be helpful to try your albuterol again scheduled over the next couple days    Given your chronic cough, you may have some inflammation in your lungs that may benefit from a treatment of steroids.  Please take 1 steroid tablet daily for 3 days.  I have given you a couple extra in case you still have a cough at that time, and you can make it a total of 5 days if still not better.    If still no improvement despite all the above, we may need you to see an ear nose and throat doctor or come in for evaluation in the clinic.      Please let us know if you are having any persistent issues or worsening, new fevers, troubles breathing        Return in about 3 months (around 7/30/2020) for next wellness visit.        Video-Visit Details    Type of service:  Video Visit    Video End Time (time video stopped): 4:24 PM  Originating Location (pt. Location): Home    Distant Location (provider location):  [facility]     Platform used for Video Visit: Sweetie Keith MD

## 2021-06-08 NOTE — PROGRESS NOTES
"Na De León is a 19 y.o. female who is being evaluated via a billable video visit.      The patient has been notified of following:     \"This video visit will be conducted via a call between you and your physician/provider. We have found that certain health care needs can be provided without the need for an in-person physical exam.  This service lets us provide the care you need with a video conversation.  If a prescription is necessary we can send it directly to your pharmacy.  If lab work is needed we can place an order for that and you can then stop by our lab to have the test done at a later time.    Video visits are billed at different rates depending on your insurance coverage. Please reach out to your insurance provider with any questions.    If during the course of the call the physician/provider feels a video visit is not appropriate, you will not be charged for this service.\"    Patient has given verbal consent to a Video visit? Yes    Patient would like to receive their AVS by AVS Preference: Octavio.    Patient would like the video invitation sent by: Send to e-mail at: [email or web address]    Will anyone else be joining your video visit? No        Video Start Time: 11:01 AM    Additional provider notes: establish care     Subjective:  Patient presents to establish care.  Patient has no major concerns currently.  She would like to come in person for a preventative health visit.  She has aged out of her pediatric provider.    Patient's medical and surgical history were reviewed, current problem list, and current medications.  We did not review social history as mom was present during this encounter and I suspect that patient would prefer privacy for this discussion.  We will address this in private when patient presents for her physical.    Patient does have a history of migraines.  Patient's sisters and mother also have a history of migraines.  Mom does well with Fioricet the patient was given " this without a trial of Imitrex.  Patient is doing well with this medication but has also noticed a decrease in frequency of her headaches since initiation on Nexplanon.    Patient does believe her Nexplanon has migrated.    Assessment/Plan:  1. Migraine without aura and without status migrainosus, not intractable  Would have a very low threshold to trial Imitrex as a first-line agent and have Fioricet as a second line agent.  I am comfortable refilling this medication as patient uses it very sparingly, and I hope that patient's migraines continue to improve with the Nexplanon, but given her age I would prefer patient to not be on a narcotic if possible.    We will discuss her social history when she returns to clinic.  Patient is happy with her current method of birth control although we can review that when she returns to clinic to ensure that it is in an appropriate location within her arm.        Video-Visit Details    Type of service:  Video Visit    Video End Time (time video stopped): 11:23 AM  Originating Location (pt. Location): Home    Distant Location (provider location):  [facility]     Platform used for Video Visit: Sweetie Borrero MD

## 2021-06-08 NOTE — TELEPHONE ENCOUNTER
Who is calling:  Patients mother  Reason for Call:  Called to schedule mantoux placement. Will need help with assisting to schedule for the read.   Date of last appointment with primary care: 05.11.20  Okay to leave a detailed message: Yes

## 2021-06-08 NOTE — TELEPHONE ENCOUNTER
Reached out to patient's mother and she is requesting orders be placed for a TB Gold Test. Patient's mother would like to limit patient's exposure to the clinic. Please advise. Thank you, Renetta Falk

## 2021-06-08 NOTE — TELEPHONE ENCOUNTER
1. Take Aricept (donepezil 10 mg) every night. This is your memory medication    2. Add Zoloft (sertraline 50 mg) every morning. This is for your anxiety    3. Follow up with Dr Dilia De La Torre (or one of our Neurology Nurse Practitioners) in 4 weeks to reassess your anxiety response and see if you're having less difficulty with concentration or finding words.       4. At that follow up visit, they may discuss starting a 2nd memory medication to help your memory Lab visit scheduled. Renetta Falk

## 2021-06-08 NOTE — TELEPHONE ENCOUNTER
Reached out to patient's mother and left a message to return phone call. Please see the below message and assist with scheduling. Thank you,  Renetta Falk

## 2021-06-08 NOTE — TELEPHONE ENCOUNTER
New Appointment Needed  What is the reason for the visit:    Mantoux Placement  Appt Request  What is the purpose of the mantoux?:  School: CNA   Is there a form to be completed?:   Yes  How soon do you need the mantoux placed?:  date: Within a week     Provider Preference: Nurse  How soon do you need to be seen?: Today or Monday   Waitlist offered?: No  Okay to leave a detailed message:  Yes

## 2021-06-09 NOTE — TELEPHONE ENCOUNTER
pts mother dropped off forms that needs to be filled out for school. Informed it takes 3-5 business days to be completed. Please inform when forms are completed

## 2021-06-09 NOTE — PROGRESS NOTES
"  Assessment/Plan:     Patient presents today for routine physical examination.    Healthcare Maintenance: USPSTF recommendations for age 19;  Patient has been counseled on/screened for:  - the benefits of supplemental folic acid  - intimate partner violence and there are no concerns at this time  - the benefits of a healthful diet, sexual health, and skin cancer prevention  Sexually transmitted infections: Patient would like to be screened for chlamydia, gonorrhea, syphilis, HIV  Immunizations: up to date  Cervical Cancer Screening: not indicated    Additional concerns are as detailed below:    1. Routine screening for STI (sexually transmitted infection)  - Chlamydia trachomatis & Neisseria gonorrhoeae, Amplified Detection  - HIV Antigen/Antibody Screening [place]  - Syphilis Screen, [place]    2. Medication management  Patient on spironolactone, cmp pending  - Comprehensive Metabolic Panel    3. Healthcare maintenance  - Hemoglobin    Return to clinic in 1 year.     I have had an Advance Directives discussion with the patient.    This note has been dictated using voice recognition software. Any grammatical or context distortions are unintentional and inherent to the the software.     Emma Borrero MD  Family Medicine [facility]    Subjective:     Na De León is a 19 y.o. female who presents to clinic for routine physical.    Additional concerns include:    1. No concerns    Diet: well balanced diet  Physical Activity: The patient maintains a regular, healthy exercise program.    PHQ-2 Score:  0    Health Care Directive: not on file but handout provided    Patient Care Team:   PCP: Emma Borrero     Past Medical History, Family History, and Social History reviewed.    Review of systems is as stated in HPI.  Patient endorses: headaches  The remainder of the 10 system review is otherwise negative.    Objective:     BP 90/64   Pulse 90   Temp 98.7  F (37.1  C)   Ht 5' 3.5\" (1.613 m)   Wt 124 lb " (56.2 kg)   LMP 07/05/2020   SpO2 98%   BMI 21.62 kg/m    Gen: Alert, NAD, appears stated age, normal hygiene   Eyes: conjunctivae without injection, sclera clear, EOMI  ENT/mouth: nares clear, septum midline, absent rhinorrhea,absent pharyngeal injection, neck is supple, no thyroid enlargement  CV: RRR, no murmur appreciated, pedal edema absent bilaterally  Resp: CTAB, no wheezes, rales or ronchi  ABD: normoactive, non-tender to palpation, nondistended  MSK: grossly full range of motion in all joints, no obvious deformity  Neuro: CN II-XII grossly intact, no deficits in coordination  Psych: no apparent hallucinations or delusions, no pressured speech; alert, oriented x3  SKIN: dry and without lesions  Heme/lymph: no pallor, no active bleeding/bruising, no adenopathy appreciated    Medications:  Current Outpatient Medications   Medication Sig     butalbital-acetaminophen-caffeine (FIORICET, ESGIC) -40 mg per tablet Take 1 tablet by mouth every 4 (four) hours as needed for pain.     etonogestrel (NEXPLANON) 68 mg Impl implant 1 each by Subdermal route once. Inserted 1/8/20 by Dr. Rosalina Nicholas     spironolactone (ALDACTONE) 100 MG tablet Take 100 mg by mouth every morning.       Allergies:  Allergies   Allergen Reactions     Other Environmental Allergy      Seasonal       PMH:  Past Medical History:   Diagnosis Date     Concussion        PSH:  History reviewed. No pertinent surgical history.    Family Hx:  Family History   Problem Relation Age of Onset     Hyperlipidemia Father      Basal cell carcinoma Mother      Anxiety disorder Sister      COPD Maternal Grandmother      Rheum arthritis Maternal Grandmother      Hypertension Maternal Grandmother      Depression Maternal Grandfather      Alzheimer's disease Maternal Grandfather      Prostate cancer Maternal Grandfather      Pacemaker Paternal Grandmother      Anxiety disorder Sister      No Medical Problems Sister        Social History:  Social History      Socioeconomic History     Marital status: Single     Spouse name: Not on file     Number of children: Not on file     Years of education: Not on file     Highest education level: Not on file   Occupational History     Not on file   Social Needs     Financial resource strain: Not on file     Food insecurity     Worry: Not on file     Inability: Not on file     Transportation needs     Medical: Not on file     Non-medical: Not on file   Tobacco Use     Smoking status: Never Smoker     Smokeless tobacco: Never Used   Substance and Sexual Activity     Alcohol use: Yes     Drug use: No     Sexual activity: Yes     Partners: Male     Birth control/protection: Implant   Lifestyle     Physical activity     Days per week: Not on file     Minutes per session: Not on file     Stress: Not on file   Relationships     Social connections     Talks on phone: Not on file     Gets together: Not on file     Attends Yarsani service: Not on file     Active member of club or organization: Not on file     Attends meetings of clubs or organizations: Not on file     Relationship status: Not on file     Intimate partner violence     Fear of current or ex partner: Not on file     Emotionally abused: Not on file     Physically abused: Not on file     Forced sexual activity: Not on file   Other Topics Concern     Not on file   Social History Narrative    Lives at home with mother, father and 3 sisters.       LDL Calculated (mg/dL)   Date Value   06/30/2017 77        Immunization History   Administered Date(s) Administered     DTaP, historic 2000, 01/29/2001, 04/18/2001, 01/29/2002, 10/11/2005     Dtap 10/11/2005     HPV 9 Valent 10/19/2015, 03/31/2016     HPV Quadrivalent 08/07/2015     Hep A, Adult IM (19yr & older) 12/07/2009, 11/14/2011     Hep A, historic 12/07/2009, 11/14/2011     Hep B, historic 2000, 01/29/2001, 12/18/2001     HiB, historic,unspecified 2000, 01/29/2001, 12/18/2001     IPV 2000, 01/29/2001,  03/20/2001, 10/11/2005     Influenza [id number], 11/21/2009     Influenza, Live, Nasal LAIV3 11/14/2011     Influenza, Seasonal, Inj PF IIV3 12/07/2009     Influenza, inj, historic,unspecified 11/15/2008, 12/07/2009, 11/12/2012     Influenza, nasal, historic 11/14/2011     Influenza, seasonal,quad inj 6-35 mos 10/21/2014     Influenza,seasonal quad, PF 12/27/2013     Influenza,seasonal quad, PF, =/> 6months 12/27/2013     Influenza,seasonal, Inj IIV3 10/11/2005, 11/17/2005, 02/17/2007, 11/15/2008, 11/12/2012, 10/21/2014     Influenza,seasonal,quad inj =/> 6months 10/19/2015, 11/09/2018     MMR 10/11/2001, 10/11/2005     Meningococcal B (PF) 02/01/2019, 03/21/2019     Meningococcal MCV4P 11/14/2011, 06/30/2017     Pneumo Conj 7-V(before 2010) 2000, 01/29/2001, 06/26/2001     Tdap 11/14/2011     Varicella 11/26/2001, 12/07/2009     There are no preventive care reminders to display for this patient.

## 2021-06-10 NOTE — TELEPHONE ENCOUNTER
Who is calling:  Maggie    Reason for Call:  Maggie was advised that the sports physical document completed by Dr. Borrero on 7/27/20 can not be uploaded to [employer]  Please mail a copy of the physical to her home address.  It is located under the chart review tab, encounter and listed as 7/27/20 Scanned Document.    Also, please leave a copy of the sports physical at the  as well.  Patient will be by to pick it up on August 3rd.     Date of last appointment with primary care:  7/27/20  Okay to leave a detailed message: Yes

## 2021-06-10 NOTE — TELEPHONE ENCOUNTER
Who is calling:  The patient   Reason for Call:  The patient states she would like the school sports letter sent to My chart.   Date of last appointment with primary care:   Okay to leave a detailed message: Yes

## 2021-06-10 NOTE — TELEPHONE ENCOUNTER
Reached out to patient and left a message to return phone call. When patient calls back, please gather additional information. The below message reads sports physical letter. Is this something Dr. Brewster was generating. Also inform patient the clinic is not able to upload documents to [employer]. We can fax, mail, or she can pick the document up from the clinic.   Thank you, Renetta Falk

## 2021-06-10 NOTE — TELEPHONE ENCOUNTER
Per patient's mother nothing further is needed as the sports physical was received via mail. Renetta Falk

## 2021-06-11 NOTE — PROGRESS NOTES
"Na De León is a 19 y.o. female who is being evaluated via a billable video visit.      The patient has been notified of following:     \"This video visit will be conducted via a call between you and your physician/provider. We have found that certain health care needs can be provided without the need for an in-person physical exam.  This service lets us provide the care you need with a video conversation.  If a prescription is necessary we can send it directly to your pharmacy.  If lab work is needed we can place an order for that and you can then stop by our lab to have the test done at a later time.    Video visits are billed at different rates depending on your insurance coverage. Please reach out to your insurance provider with any questions.    If during the course of the call the physician/provider feels a video visit is not appropriate, you will not be charged for this service.\"    Patient has given verbal consent to a Video visit? Yes  How would you like to obtain your AVS? AVS Preference: MyChart.  If dropped by the video visit, the video invitation should be sent to:   Will anyone else be joining your video visit? No        Video Start Time: 9:41 AM    Additional provider notes: mood concerns      Assessment/Plan:     Patient presents to clinic for evaluation of worsening of baseline anxiety.  She has never been to therapy and has never taken medication for this, but her family has a strong history for anxiety and she wondered if the same medication that worked for her sisters would work for her.  We discussed that Wellbutrin could actually exacerbate the anxiety but given its fast mechanism of action, patient was willing to trial this over the weekend to see if she noted any improvement.  Also prescribed hydroxyzine for as needed/breakthrough anxiolysis.  Finally, attempted to extubate virtues of therapy, patient will consider cognitive behavioral therapy work with but was not at this time " interested in a referral.    1. Anxiety  - hydrOXYzine HCL (ATARAX) 25 MG tablet; Take 0.5-2 tablets (12.5-50 mg total) by mouth 3 (three) times a day as needed for itching.  Dispense: 90 tablet; Refill: 3  - buPROPion (WELLBUTRIN XL) 150 MG 24 hr tablet; Take 1 tablet (150 mg total) by mouth daily.  Dispense: 30 tablet; Refill: 0      Discontinued Medications:  Medications Discontinued During This Encounter   Medication Reason     spironolactone (ALDACTONE) 100 MG tablet Therapy completed       Return to clinic in 4 days.     This note has been dictated using voice recognition software. Any grammatical or context distortions are unintentional and inherent to the the software.     Emma Borrero MD  Family Medicine [facility]      Subjective:      Na De León is a 19 y.o. female who presents to clinic for anxiety.    Patient believes that she has always suffered from baseline anxiety, but is now significantly worse.  Although it is happening during the pandemic and with some civil unrest in this setting, patient does not believe that is what is triggering her.  She believes it is worse because she is going back to school and she is on the soccer team.  She has never been on medication for this in the past and has never been to therapy.  She feels the baseline is significantly worse than it is per usual and her family is positive with multiple members having anxiety.  She wonders at the same medication that worked for them worked for her.  She has no thoughts of harming herself or others and her PHQ 9 today is a 1.    Old records reviewed:   - previous physical    Past Medical History, Family History, and Social History reviewed.   Social History     Tobacco Use   Smoking Status Never Smoker   Smokeless Tobacco Never Used       Review of systems is as stated in HPI.    Objective:   Unable to obtain significant objective data due to virtual visit status.       Video-Visit Details    Type of service:   Video Visit    Video End Time (time video stopped): 9:55 AM  Originating Location (pt. Location): Home    Distant Location (provider location):  [facility] MEDICINE/OB     Platform used for Video Visit: Sweetie Borrero MD

## 2021-06-11 NOTE — PROGRESS NOTES
[facility] Well Child Check    ASSESSMENT & PLAN  Na De León is a 16  y.o. 9  m.o. who has normal growth and normal development.    Diagnoses and all orders for this visit:    Encounter for routine child health examination without abnormal findings  -     Meningococcal MCV4P  -     Hearing Screening  -     Vision Screening  -     Lipid Profile  -     Vitamin D, Total (25-Hydroxy)  -     HM1(CBC and Differential)  -     HM1 (CBC with Diff)    Other orders  -     Cancel: Hemoglobin      Return to clinic in 1 year for a Well Child Check or sooner as needed    IMMUNIZATIONS/LABS  Immunizations were reviewed and orders were placed as appropriate. and I have discussed the risks and benefits of all of the vaccine components with the patient/parents.  All questions have been answered.    REFERRALS  Dental:  Recommend routine dental care as appropriate., The patient has already established care with a dentist.  Other:  No referrals were made at this time.    ANTICIPATORY GUIDANCE  I have reviewed age appropriate anticipatory guidance.  Social:  Friends, Extracurricular Activities and Changes and Choices  Parenting:  [place]/Dependence, Homework and Confidential Health Care  Nutrition:  Age Specific Nutritional Needs  Play and Communication:  Organized Sports, Hobbies and Read Books  Health:  Activity (>45 min/day), Sleep, Sun Screen and Dental Care  Safety:  Seat Belts, Swimming Safety and Bike/Motorcycle Helmets  Sexuality:  Safe Sex and STD's    HEALTH HISTORY  Do you have any concerns that you'd like to discuss today?: No concerns     Roomed by: gabby    Accompanied by Mother    Refills needed? No    Do you have any forms that need to be filled out? No      Do you have any significant health concerns in your family history?: No, see below.  Family History   Problem Relation Age of Onset     Hyperlipidemia Father      Basal cell carcinoma Mother      Anxiety disorder Sister      COPD Maternal Grandmother       Rheum arthritis Maternal Grandmother      Hypertension Maternal Grandmother      Depression Maternal Grandfather      Alzheimer's disease Maternal Grandfather      Prostate cancer Maternal Grandfather      Pacemaker Paternal Grandmother      Anxiety disorder Sister      No Medical Problems Sister       Since your last visit, have there been any major changes in your family, such as a move, job change, separation, divorce, or death in the family?: No    Home  Who lives in your home?:  Mom, dad, 1 sister- other 2 are in college  Social History     Social History Narrative    Lives at home with mother, father and 3 sisters.     Do you have any trouble with sleep?:  No, she falls asleep easily in the evening. She sleeps soundly through the night without waking. She gets sufficient restful sleep each night. She is well-rested and has a good energy level during the day.    Education  What school does your child attend?:  [employer]  What grade is your child in?:  11th in fall 2017  How does the patient perform in school (grades, behavior, attention, homework?: no issues. She had a good 10th grade year and had nice teachers. She has good friendships. She does not like school much. She will be starting 11th grade in the fall. She is doing well academically and socially. She plans to attend college and play collegiate soccer after high school graduation. She particularly likes the [employer] and has met with the [employer]'s . She would like to study biology to pursue veterinary medicine.    Eating She has a good appetite. She eats a healthy, balanced diet with a variety of fruits, vegetables, and proteins. She drinks one glass of whole milk and water daily. She occasionally drinks sports drinks and soda. She is well-nourished and maintaining a healthy body weight. She does not pay significant attention to what she eats. She does not currently take a daily multivitamin.  Does patient eat regular  meals including fruits and vegetables?:  yes  What is the patient drinking (cow's milk, water, soda, juice, sports drinks, energy drinks, etc)?: cow's milk- whole, water, soda and sports drinks  Does patient have concerns about body or appearance?:  No    Activities  Does the patient have friends?:  yes  Does the patient get at least one hour of physical activity per day?:  yes  Does the patient have less than 2 hours of screen time per day (aside from homework)?:  no, gets more than 2 hrs  What does your child do for exercise?:  Soccer, cardio, weightlifting  Does the patient have interest/participate in community activities/volunteers/school sports?:  no    MENTAL HEALTH SCREENING  PHQ-2 Total Score: 0 (5/30/2017  3:00 PM)   She denies any significant, limiting anxiety symptoms. She reports feeling normal stress levels surrounding school and soccer. She is still able to focus on and complete her work. She has not met with a mental health counselor and would not like to meet with one. She has friends she can turn to if needed but she mostly handles her personal issues independently.  2 older sisters with history of anxiety and use of medication management for anxiety.      VISION/HEARING  Vision: Completed. See Results  Hearing:  Completed. See Results     Hearing Screening    125Hz 250Hz 500Hz 1000Hz 2000Hz 3000Hz 4000Hz 6000Hz 8000Hz   Right ear:   25 20 20  20     Left ear:   25 20 20  20        Visual Acuity Screening    Right eye Left eye Both eyes   Without correction: 20/20 20/20    With correction:        TB Risk Assessment:  The patient and/or parent/guardian answer positive to:  patient and/or parent/guardian answer 'no' to all screening TB questions    Dental  Is your child being seen by a dentist?  Yes    Patient Active Problem List   Diagnosis   (none) - all problems resolved or deleted     Drugs  Does the patient use tobacco/alcohol/drugs?:  She denies self use; has friends who use these  "substances    Safety  Does the patient have any safety concerns (peer or home)?:  no  Does the patient use safety belts, helmets and other safety equipment?:  yes    Sex  Is the patient sexually active?:  No; identifies herself as straight.    REVIEW OF SYSTEMS  History obtained from mother and child.  General: Negative  Concussion: She sustained a concussion one month ago in [place] while playing soccer. She was part of a tackle when she fell backwards and struck her head on the ground. She took three weeks off of play and has recovered fully. She has not had any residual complications and has returned to play as usual. This was her first concussion.  Menstruation: She experiences regular monthly periods. Her periods typically last about five days. She rates her menorrhea as normal. She changes her pad every 4 hours. She does not have cramping during her period but experiences it up to two weeks afterward.  Dental: She brushes her teeth daily. She does not have dental caries.  Her parents have no other health or developmental concerns.    MEASUREMENTS  Height:  5' 3\" (1.6 m)  Weight: 120 lb 14.4 oz (54.8 kg)  BMI: Body mass index is 21.42 kg/(m^2).  Blood Pressure: 96/64  Blood pressure percentiles are 8 % systolic and 43 % diastolic based on NHBPEP's 4th Report. Blood pressure percentile targets: 90: 124/80, 95: 128/84, 99 + 5 mmH/96.    PHYSICAL EXAM  General: Awake, Alert and Cooperative   Head: Normocephalic and Atraumatic   Eyes: PERRL and EOMI   ENT: Normal pearly TMs bilaterally and Oropharynx clear. Tonsils normal. Normal dentition.   Neck: Supple and Thyroid without enlargement or nodules. No lymphadenopathy.   Chest: Chest wall normal and Breasts sexual maturity rating 4.   Lungs: Clear to auscultation bilaterally   Heart:: Regular rate and rhythm and no murmurs. Femoral pulses 2+ bilaterally.   Abdomen: Soft, nontender, nondistended, no mass palpable, and no hepatosplenomegaly.   : normal " external female genitalia and sexual maturity rating 4.   Spine: Spine straight without curvature noted   Musculoskeletal: Moving all extremities and No pain in the extremities   Neuro: Alert and oriented times 3, Normal tone in upper and lower extremities, Grossly normal and DTRs +2 bilaterally   Skin: No lesions or rashes noted     Complete sports physical and questionnaire completed, no restrictions.    ADDITIONAL HISTORY SUMMARIZED (2): Reviewed Dr. Anand's note from 5/30/17 regarding initial concussion consultation.  DECISION TO OBTAIN EXTRA INFORMATION (1): None.   RADIOLOGY TESTS (1): None.  LABS (1): Ordered labs.  MEDICINE TESTS (1): None.  INDEPENDENT REVIEW (2 each): None.     The visit lasted a total of 22 minutes face to face with the patient. Over 50% of the time was spent counseling and educating the patient about her overall health and development.    IMadi, am scribing for and in the presence of, Dr. Brewster.    IErum, personally performed the services described in this documentation, as scribed by Madi Chen in my presence, and it is both accurate and complete.    Total Data Points: 3

## 2021-06-12 NOTE — PROGRESS NOTES
"Na De León is a 20 y.o. female who is being evaluated via a billable video visit.      The patient has been notified of following:     \"This video visit will be conducted via a call between you and your physician/provider. We have found that certain health care needs can be provided without the need for an in-person physical exam.  This service lets us provide the care you need with a video conversation.  If a prescription is necessary we can send it directly to your pharmacy.  If lab work is needed we can place an order for that and you can then stop by our lab to have the test done at a later time.    Video visits are billed at different rates depending on your insurance coverage. Please reach out to your insurance provider with any questions.    If during the course of the call the physician/provider feels a video visit is not appropriate, you will not be charged for this service.\"    Patient has given verbal consent to a Video visit? Yes  How would you like to obtain your AVS? AVS Preference: MyChart.  If dropped by the video visit, the video invitation should be sent to:   Will anyone else be joining your video visit? No        Video Start Time: 8:36 AM    Additional provider notes: mood follow up      Assessment/Plan:     Patient presents to clinic via virtual visit today for follow-up on mood concerns.  Although she is feeling well, with the upcoming winter in her baseline anxiety symptoms, we chose to initiate on low-dose Zoloft.  Patient will take 1/2 tablet for 1 week and then bump up to 50 mg once daily.  She will continue to use the hydroxyzine as needed for anxiety.    1. Anxiety  - sertraline (ZOLOFT) 50 MG tablet; Take 1 tablet (50 mg total) by mouth daily.  Dispense: 90 tablet; Refill: 2      Discontinued Medications:  Medications Discontinued During This Encounter   Medication Reason     buPROPion (WELLBUTRIN XL) 150 MG 24 hr tablet Side effects       Return to clinic in 4 weeks.    This note " "has been dictated using voice recognition software. Any grammatical or context distortions are unintentional and inherent to the the software.     Emma Borrero MD  Family Medicine [facility]      Subjective:      Na De León is a 20 y.o. female who presents to clinic for mood follow-up.    Patient try the Wellbutrin for 1 week but noticed significant symptoms of dizziness and malaise.  She felt \"sick\" all day long when she took it.  She stopped it about 4 days ago and the symptoms immediately resolved.  She has not yet tried the hydroxyzine.  She feels as though her life situation is such that she does not experience any significant anxiety in the past 2 weeks.    Patient is wondering if she even needs a daily medication or if we should wait and see how things go.    Past Medical History, Family History, and Social History reviewed.   Social History     Tobacco Use   Smoking Status Never Smoker   Smokeless Tobacco Never Used       Review of systems is as stated in HPI.    Objective:   Unable to obtain significant objective data due to virtual visit status.       Video-Visit Details    Type of service:  Video Visit    Video End Time (time video stopped): 8:45 AM  Originating Location (pt. Location): Home    Distant Location (provider location):  [facility]     Platform used for Video Visit: [facility]      Emma Borrero MD  "

## 2021-06-13 ENCOUNTER — HEALTH MAINTENANCE LETTER (OUTPATIENT)
Age: 21
End: 2021-06-13

## 2021-06-15 NOTE — PROGRESS NOTES
Assessment & Plan      Patient has a known history of anxiety and now presents with some depression symptoms, although her PHQ-9 score is low.  With shared decision making we chose to restart patient on the Wellbutrin given its fast mechanism of action, but a very low threshold to add or switch to an SSRI.  She has previously tried Zoloft, so I would start with a low-dose Prozac.  Discussed again the pathophysiology of these medications and how long-acting they can be, and patient will respond back if she does not tolerate the Wellbutrin.  Otherwise follow-up in 1 month.    Anxiety  Symptoms of depression  - buPROPion (WELLBUTRIN XL) 150 MG 24 hr tablet  Dispense: 30 tablet; Refill: 0         No follow-ups on file.    Emma Borrero MD  [facility] is 20 y.[facility]. and presents today for the following health issues   HPI     History of anxiety. Tried zoloft a few months ago but she stopped taking it at the 50 mg dose after being on it for 3 weeks. She was also on hydroxyzine but it didn't help. She has also tried wellbutrin.  Patient has no thoughts of harming herself, and believes her anxiety is well controlled.  However, she has been feeling down more recently.  She is resistant to the idea of therapy at this time.      Review of Systems  none      Objective    /62   Pulse 83   Wt 122 lb (55.3 kg)   LMP 03/03/2021   SpO2 97%   BMI 21.27 kg/m    Body mass index is 21.27 kg/m .  Physical Exam  Gen: NAD  Psych: Normal affect, no hallucinations or delusions, not tearful

## 2021-06-15 NOTE — PROGRESS NOTES
Chief Complaint   Patient presents with     Numbness     To the fingers and toe. admit feeling nausea . Started this AM .          HPI    Patient is here for waking up this AM looking pale, almost passed out, with a 7/10 constant central abdominal pain associated with nausea, and moderate headache, along with numbness of her fingers and toes bilaterally. NO cough, fever, chest pain, shortness of breath. No recent cold exposure. She is otherwise generally healthy. LMP 3 wks ago, denied chance of being pregnant.     ROS: Pertinent ROS noted in HPI.     Allergies   Allergen Reactions     Other Environmental Allergy      Seasonal       Patient Active Problem List   Diagnosis   (none) - all problems resolved or deleted       Family History   Problem Relation Age of Onset     Hyperlipidemia Father      Basal cell carcinoma Mother      Anxiety disorder Sister      COPD Maternal Grandmother      Rheum arthritis Maternal Grandmother      Hypertension Maternal Grandmother      Depression Maternal Grandfather      Alzheimer's disease Maternal Grandfather      Prostate cancer Maternal Grandfather      Pacemaker Paternal Grandmother      Anxiety disorder Sister      No Medical Problems Sister        supraclavicular      Objective:    Vitals:    12/28/17 0902   BP: 100/54   Pulse: 108   Resp: 14   Temp: 98.5  F (36.9  C)   SpO2: 98%       Gen: appeared tired, but NAD  Oropharynx: Normal throat, oral mucosa  Ears: normal TMs and canals bilaterally  Nose: no discharge  Neck:NAD  CV:RRR, no M, R, G. Normal peripheral pulses.   Pulm: CTAB, normal effort  Abd: normal bowel sounds, soft, mild pain diffusely without guarding, no mass.  MSK: warm extremities.   Neuro: normal speech. Decreased gross sensation of fingers and toes, with absent gross sensation of bilateral 2nd and 3rd toes.       A/P:  Numbness of fingers and toes with abdominal pain. Recommended ER evaluation. [facility] and was told they are only taking kids  <13 now. Spoke with [facility]'s ER provider (Dr. Ballesteros) who said [facility] might be better served at [facility] given her symptoms. I explained this to patient's father. Since [facility] not [facility] patient's [facility], she will first go to [place].

## 2021-06-15 NOTE — PROGRESS NOTES
ASSESSMENT:  1. Acne  - Pregnancy, Urine    2. Contraception  - Pregnancy, Urine      PLAN:  Jona's older sisters had good outcome with their acne when they started OCP and Jona would like to do the same.  Reviewed recommendations for no smoking while on OCP- can increase risk of blood clots.  She has no interest to return to adapalene due to her significant worsening of symptoms with trial of her sister's adapalene.  I have requested follow up in clinic in 3 months for renewal of OCP, blood pressure monitoring, and follow up of acne.    Patient Instructions   Follow-up in clinic in 3 months to discuss how your birth control is working and re-evaluate your acne.      Orders Placed This Encounter   Procedures     Pregnancy, Urine     There are no discontinued medications.    No Follow-up on file.    CHIEF COMPLAINT:  Chief Complaint   Patient presents with     Acne       HISTORY OF PRESENT ILLNESS:  Jona is a 17 y.o. female presenting to the clinic today to discuss acne. She is accompanied by her mother.    She thinks she has been having issues with acne for the past year. She has been using a daily facial wash to manage her skin such as Clean & Clear, Aveeno, etc. Mom estimates she has tried at least 5 different brands. She has not used creams to target individual pimples. She mostly has acne on her face and sometimes on her back but not on her chest. She does not get scars from her acne. She has noticed more pimples in areas where she tends to sweat more. Mom notes her maternal aunt had issues with scarring acne. She has friends who use Accutane and adapalene. She has tried her sister's Adapalene once before but it caused her skin to break out significantly within 24 hours so she stopped using it. She did not use it during or around the time of her menstrual period.     Recent Results (from the past 24 hour(s))   Pregnancy, Urine   Result Value Ref Range    Pregnancy Test, Urine Negative Negative    Specific  Gravity, UA 1.020 1.001 - 1.030        REVIEW OF SYSTEMS:   She is not currently on birth control. Her last period ended one week ago. All other systems are negative.    PFSH:  She has had several peers become ill lately. Mom notes she had hypertension on birth control but Renategan's  sisters did not have any issues.    History reviewed. No pertinent past medical history.    Family History   Problem Relation Age of Onset     Hyperlipidemia Father      Basal cell carcinoma Mother      Anxiety disorder Sister      COPD Maternal Grandmother      Rheum arthritis Maternal Grandmother      Hypertension Maternal Grandmother      Depression Maternal Grandfather      Alzheimer's disease Maternal Grandfather      Prostate cancer Maternal Grandfather      Pacemaker Paternal Grandmother      Anxiety disorder Sister      No Medical Problems Sister      History reviewed. No pertinent surgical history.    TOBACCO USE:  History   Smoking Status     Never Smoker   Smokeless Tobacco     Never Used     VITALS:  Vitals:    02/02/18 1451   BP: 96/72   Patient Site: Left Arm   Patient Position: Sitting   Cuff Size: Adult Regular   Pulse: 86   Resp: 22   Temp: 97.8  F (36.6  C)   TempSrc: Oral   Weight: 118 lb 4.8 oz (53.7 kg)     Wt Readings from Last 3 Encounters:   02/02/18 118 lb 4.8 oz (53.7 kg) (41 %, Z= -0.22)*   12/28/17 122 lb (55.3 kg) (50 %, Z= -0.01)*   06/30/17 120 lb 14.4 oz (54.8 kg) (50 %, Z= 0.00)*     * Growth percentiles are based on [facility] 2-20 Years data.     There is no height or weight on file to calculate BMI.    PHYSICAL EXAM:  General: Alert, no acute distress.  Skin: Comedonal and papular acne present on forehead, nose, and chin. Papular acne present on back.  CV: REgular rate and rhythm, no murmurs, rubs or gallops  Lungs: CTA bilaterally.    ADDITIONAL HISTORY SUMMARIZED (2): None.  DECISION TO OBTAIN EXTRA INFORMATION (1): None.   RADIOLOGY TESTS (1): None.  LABS (1): Ordered lab.  MEDICINE TESTS (1):  None.  INDEPENDENT REVIEW (2 each): None.    The visit lasted a total of 13 minutes face to face with the patient. Over 50% of the time was spent counseling and educating the patient about her acne, birth control and treatment plan.    I, Madi Chen, am scribing for and in the presence of, Dr. Brewster.    IErum MD, personally performed the services described in this documentation, as scribed by Madi Chen in my presence, and it is both accurate and complete.    MEDICATIONS:  Current Outpatient Prescriptions   Medication Sig Dispense Refill     norgestimate-ethinyl estradiol (ORTHO TRI-CYCLEN) 0.18/0.215/0.25 mg-35 mcg (28) Tab tablet Take 1 tablet by mouth daily. 3 Package 0     No current facility-administered medications for this visit.        Total data points: 1   Thrombophlebitis of leg, superficial Thrombophlebitis of leg, superficial Atrial fibrillation Thrombophlebitis of leg, superficial Thrombophlebitis of leg, superficial Atrial fibrillation Atrial fibrillation

## 2021-06-16 PROBLEM — F41.9 ANXIETY: Status: ACTIVE | Noted: 2020-10-06

## 2021-06-16 PROBLEM — S06.0X0A CONCUSSION WITHOUT LOSS OF CONSCIOUSNESS: Status: ACTIVE | Noted: 2019-08-15

## 2021-06-16 PROBLEM — G43.009 MIGRAINE WITHOUT AURA AND WITHOUT STATUS MIGRAINOSUS, NOT INTRACTABLE: Status: ACTIVE | Noted: 2020-05-11

## 2021-06-16 NOTE — TELEPHONE ENCOUNTER
Refill request for medication: Fioricet  Last visit addressing this medication: 7/6/2020  Follow up plan unknown    Last refill on 8/15/2019, quantity #60   CSA completed Not on file   checked  03/23/21, last dispensed refill over one year ago.    Appointment: Not due     Fabiana Slaughter LPN

## 2021-06-16 NOTE — TELEPHONE ENCOUNTER
Controlled Substance Refill Request  Medication Name:   Requested Prescriptions     Pending Prescriptions Disp Refills     butalbital-acetaminophen-caffeine (FIORICET, ESGIC) -40 mg per tablet [Pharmacy Med Name: [id number] -40] 60 tablet 1     Sig: TAKE 1 TABLET BY MOUTH EVERY 4 HOURS AS NEEDED FOR PAIN     Date Last Fill: 8/15/19  Requested Pharmacy: [facility]  Submit electronically to pharmacy  Controlled Substance Agreement on file:   Encounter-Level CSA Scan Date:    There are no encounter-level csa scan date.        Last office visit:  3/9/21

## 2021-06-16 NOTE — TELEPHONE ENCOUNTER
Hello!  I am one of Dr. Brewster's pediatric partners and I'm covering her refill requests today.  It looks like you have been following this patient and prescribing this medication - forwarding this refill request to you.  Thanks!

## 2021-06-16 NOTE — PROGRESS NOTES
Na De León is a 20 y.o. female who is being evaluated via a billable video visit.      How would you like to obtain your AVS? MyChart.  If dropped from the video visit, the video invitation should be resent by: Text to cell phone: see chart  Will anyone else be joining your video visit? No      Video Start Time: 3:40 PM    Assessment & Plan     Anxiety  Symptoms of depression  Refilled patient's prescription as she seems to be tolerating this medication well and it is seem to be causing some improvement in symptoms.  - buPROPion (WELLBUTRIN XL) 150 MG 24 hr tablet  Dispense: 90 tablet; Refill: 3       No follow-ups on file.    Emma Borrero MD  [facility]    Subjective   Na De León is 20 y.o. and presents today for the following health issues   HPI     Patient feels pretty good after being on Wellbutrin for 1 month.  She had no trouble sleeping and her anxiety does seem to be better.  She does not have any nausea which she was experiencing previously.  She was recently placed on amitriptyline from her concussion clinic and has not noticed any improvement or worsening of symptoms.  She does not desire medication changes.  She is not interested in therapy at this time would consider in the future.    Review of Systems  n/a      Objective       Vitals:  No vitals were obtained today due to virtual visit.    Physical Exam  Gen: NAD  Psych: Normal affect, no hallucinations or delusions, not tearful        Video-Visit Details    Type of service:  Video Visit    Video End Time (time video stopped): 3:46 PM  Originating Location (pt. Location): Home    Distant Location (provider location):  [facility]     Platform used for Video Visit: [employer]

## 2021-06-16 NOTE — TELEPHONE ENCOUNTER
RN cannot approve Refill Request    RN can NOT refill this medication med is not covered by policy/route to provider. Last office visit: 3/9/2021 Emma Borrero MD Last Physical: 7/6/2020 Last MTM visit: Visit date not found Last visit same specialty: 3/9/2021 Emma Borrero MD.  Next visit within 3 mo: Visit date not found  Next physical within 3 mo: Visit date not found      Maura Jarvis, [facility] Triage/Med Refill 3/31/2021    Requested Prescriptions   Pending Prescriptions Disp Refills     buPROPion (WELLBUTRIN XL) 150 MG 24 hr tablet [Pharmacy Med Name: BUPROPION HCL  MG TABLET] 30 tablet 0     Sig: TAKE 1 TABLET BY MOUTH EVERY DAY       Tricyclics/Misc Antidepressant/Antianxiety Meds Refill Protocol Failed - 3/31/2021 11:30 AM        Failed - Age 21 and younger route to prescribing provider     Last office visit with prescriber/PCP: 3/9/2021 Emma Borrero MD OR same dept: 3/9/2021 Emma Brorero MD OR same specialty: 3/9/2021 Emma Borrero MD  Last physical: 7/6/2020 Last MTM visit: Visit date not found   Next visit within 3 mo: Visit date not found  Next physical within 3 mo: Visit date not found  Prescriber OR PCP: Emma Borrero MD  Last diagnosis associated with med order: 1. Anxiety  - buPROPion (WELLBUTRIN XL) 150 MG 24 hr tablet [Pharmacy Med Name: BUPROPION HCL  MG TABLET]; TAKE 1 TABLET BY MOUTH EVERY DAY  Dispense: 30 tablet; Refill: 0    2. Symptoms of depression  - buPROPion (WELLBUTRIN XL) 150 MG 24 hr tablet [Pharmacy Med Name: BUPROPION HCL  MG TABLET]; TAKE 1 TABLET BY MOUTH EVERY DAY  Dispense: 30 tablet; Refill: 0    If protocol passes may refill for 12 months if within 3 months of last provider visit (or a total of 15 months).

## 2021-06-17 NOTE — PATIENT INSTRUCTIONS - HE
Patient Instructions by Erum Brewster MD at 2/1/2019  2:40 PM     Author: Erum Brewster MD Service: -- Author Type: Physician    Filed: 2/1/2019  3:46 PM Encounter Date: 2/1/2019 Status: Addendum    : Erum Brewster MD (Physician)    Related Notes: Original Note by Erum Brewster MD (Physician) filed at 2/1/2019  3:46 PM       As part of annual well teen checks, the [place] Department of Health recommends that females age 16 and older be screened yearly for chlamydia, whether or not they have told their provider they have a history of sexual activity.  Chlamydia is a sexually transmitted infection that does not always have symptoms.  Untreated chlamydia infections can lead to infertility, chronic pelvic pain, or ectopic pregnancy.  Your child may have had chlamydia screening as part of their well teen check today.  However, in accordance with state law (statute 144.431-347), your teen's results of chlamydia testing are confidential to your teen and medical provider.  This statue guarantees confidentiality for teens and their health care providers when discussing sexually transmitted infections.  If your child was tested, I will notify your child of the result directly.      If periods are not regulated with birth control pills by May, please contact me or make an appt with a gynecologist so that there is time to be seen prior to going to [place]    Monitor headaches: be mindful to drink enough water (at least 60-80 oz per day) , get 8-10 hours of sleep  Incorporate stretching/ yoga as you can          Patient Education             [employer] Patient Handout   18 to 21 Year Visits     Your Daily Life    Visit the dentist at least twice a year.    Protect your hearing at work, home, and concerts.    Eat a variety of healthy foods.    Eat breakfast every morning.    Drink plenty of water.    Make sure to get enough calcium.    Have 3 or more servings of low-fat (1%) or  fat-free milk and other low-fat dairy products each day.    Aim for 1 hour of vigorous physical activity.    Be proud of yourself when you do something well.  Healthy Behavior Choices    Support friends who choose not to use drugs, alcohol, tobacco, steroids, or diet pills.    If you use drugs or alcohol, you can talk to us about it. We can help you with quitting or cutting down on your use.    Make healthy decisions about your sexual behavior.    If you are sexually active, always practice safe sex. Always use a condom to prevent STIs.    All sexual activity should be something you want. No one should ever force or try to convince you.    Find safe activities at school and in the community. Violence and Injuries    Do not drink and drive or ride in a vehicle with someone who has been using drugs or alcohol.    If you feel unsafe driving or riding with someone, call someone you trust to drive you.    Always wear a seat belt in the car.    Know the rules for safe driving.    Never allow physical harm of yourself or others at home or school.    Always deal with conflict using nonviolence.    Remember that healthy dating relationships are built on respect and that saying no is OK.    Fighting and carrying weapons can be dangerous.  Your Feelings    Figure out healthy ways to deal with stress.    Try your best to solve problems and make decisions on your own.    Most people have daily ups and downs. But if you are feeling sad, depressed, nervous, irritable, hopeless, or angry, talk with me or another health professional.    We understand sexuality is an important part of your development. If you have any questions or concerns, we are here for you. School and Friends    Take responsibility for being organized enough to succeed in work or school.    Find new activities you enjoy.    Consider volunteering and helping others in the community on an issue that interests or concerns you.    Form healthy friendships and find  fun, safe things to do with friends.    As you get older, making and keeping friends is important. You may find that you drift away from some of your old friends--thats normal.    Evaluate your friendships and keep those that are healthy.    It is still important to stay connected with your family.

## 2021-06-17 NOTE — TELEPHONE ENCOUNTER
Telephone Encounter by Nyasia Urias LPN at 4/13/2020  1:21 PM     Author: Nyasia Urias LPN Service: -- Author Type: Licensed Nurse    Filed: 4/13/2020  1:29 PM Encounter Date: 4/13/2020 Status: Signed    : Nyasia Urias LPN (Licensed Nurse)       Patient Returning Call  Reason for call:  Patient returning call   Information relayed to patient:  Jone Umana MD   to Erum Brewster Care Team [place]         4/13/20 12:19 PM   Note      I suggest scheduling a video/phone visit.  If she is experiencing acute symptoms, such as shortness of breath or chest pain, she should re-contact [email or web address]. Thanks.      Patient has additional questions:  Yes  If YES, what are your questions/concerns:  Patient states she still have cough its over a month no fever or shortness of breath when she initially  called about symptoms she was suggested to fill [email or web address] 2  weeks ago the response was to take tylenol, and cough medicine patient took cough medicine Nyquil and dayquil but its not improving requesting for Rx for different cough Medicaine. Please call patient mother at [phone]   Okay to leave a detailed message?: No

## 2021-06-18 NOTE — PROGRESS NOTES
ASSESSMENT:  1. Contraception management    2. Acne    PLAN:  Na is very happy with the results with starting oral contraception and improvement of her acne.  She would like to continue contraception. She has not had any significant side effects.  Follow up at her next teen well visit.     Patient Instructions   Follow-up at your next annual physical this summer.      No orders of the defined types were placed in this encounter.    Medications Discontinued During This Encounter   Medication Reason     TRI-ESTARYLLA 0.18/0.215/0.25 mg-35 mcg (28) Tab tablet Reorder       No Follow-up on file.    CHIEF COMPLAINT:  Chief Complaint   Patient presents with     Follow-up     skin       HISTORY OF PRESENT ILLNESS:  Na is a 17 y.o. female presenting to the clinic today for a follow-up of her acne. She is accompanied by her mother and sister.    She is currently taking Tri-Estarylla for management of her acne. Her skin has significantly improved. She mostly struggles with acne on her face and back. Her menstrual periods have become regular and lighter. She has normal menorrhea and minimal cramping. Her blood pressure is normal today in clinic. She denies any adverse side effects such as breakthrough bleeding. She takes her OCP at the same time each day. She is happy with her current management regimen. She is currently on her menstrual period.    REVIEW OF SYSTEMS:   She has been afebrile. All other systems are negative.    PFSH:  No other pertinent history reviewed.    History reviewed. No pertinent past medical history.    Family History   Problem Relation Age of Onset     Hyperlipidemia Father      Basal cell carcinoma Mother      Anxiety disorder Sister      COPD Maternal Grandmother      Rheum arthritis Maternal Grandmother      Hypertension Maternal Grandmother      Depression Maternal Grandfather      Alzheimer's disease Maternal Grandfather      Prostate cancer Maternal Grandfather      Pacemaker Paternal  "Grandmother      Anxiety disorder Sister      No Medical Problems Sister      History reviewed. No pertinent surgical history.    Social History     Social History     Marital status: Single     Spouse name: N/A     Number of children: N/A     Years of education: N/A     Occupational History     Not on file.     Social History Main Topics     Smoking status: Never Smoker     Smokeless tobacco: Never Used     Alcohol use Not on file     Drug use: Not on file     Sexual activity: Not on file     Other Topics Concern     Not on file     Social History Narrative    Lives at home with mother, father and 3 sisters.     TOBACCO USE:  History   Smoking Status     Never Smoker   Smokeless Tobacco     Never Used     VITALS:  Vitals:    05/21/18 1608   BP: 100/60   Patient Site: Left Arm   Patient Position: Sitting   Cuff Size: Adult Regular   Pulse: 86   Temp: 98.2  F (36.8  C)   TempSrc: Oral   Weight: 127 lb 14.4 oz (58 kg)   Height: 5' 3\" (1.6 m)     Wt Readings from Last 3 Encounters:   05/21/18 127 lb 14.4 oz (58 kg) (59 %, Z= 0.23)*   02/02/18 118 lb 4.8 oz (53.7 kg) (41 %, Z= -0.22)*   12/28/17 122 lb (55.3 kg) (50 %, Z= -0.01)*     * Growth percentiles are based on CDC 2-20 Years data.     Body mass index is 22.66 kg/(m^2).    PHYSICAL EXAM:  General: Alert, no acute distress.  Ears: TMs are without erythema, pus or fluid. Position and landmarks are normal.    Nose: Clear.    Throat: Oropharynx is moist and clear, without tonsillar hypertrophy, asymmetry, exudate or lesions.  Neck: Supple without lymphadenopathy or tenderness. No thyromegaly or nodules.  Lungs: Clear to auscultation bilaterally. No wheezes, rhonchi, or rales. No prolongation of expiratory phase. No tachypnea, retractions, or increased work of breathing.  Cardiac: Regular rate and rhythm, no murmur audible.  Skin: Very clear with very few scattered papular acne lesions on face and back.    ADDITIONAL HISTORY SUMMARIZED (2): None.  DECISION TO OBTAIN " EXTRA INFORMATION (1): None.   RADIOLOGY TESTS (1): None.  LABS (1): None.  MEDICINE TESTS (1): None.  INDEPENDENT REVIEW (2 each): None.    The visit lasted a total of 7 minutes that I spent face to face with the patient. Over 50% of the time was spent counseling and educating the patient about continued medication management of her acne and menstrual cycle.    I, Madi Chen, am scribing for and in the presence of, Dr. Brewster.    I, Erum Brewster MD, personally performed the services described in this documentation, as scribed by Madi Chen in my presence, and it is both accurate and complete.    MEDICATIONS:  Current Outpatient Prescriptions   Medication Sig Dispense Refill     norgestimate-ethinyl estradiol (TRI-ESTARYLLA) 0.18/0.215/0.25 mg-35 mcg (28) Tab tablet Take 1 tablet by mouth daily. 84 tablet 3     No current facility-administered medications for this visit.        Total data points: 0

## 2021-06-21 NOTE — PROGRESS NOTES
ASSESSMENT/PLAN:  1. Dysmenorrhea    2. Chronic low back pain  - Ambulatory referral to Adult PT- External    Na has prolonged bleeding on her current OCP regimen. At this time will trial low dose estrogen OCP to see if that improves symptoms  If no improvement in 2-3 months I have discussed with her seeing a gynecologist for further evaluation.  She also has chronic low back pain that has been ongoing.  No concerns for potential bony involvement at this time.  I recommend physical therapy- referral put into EHR. Mom desires to go to [facility] [place] [facility] for PT- mom will contact physical therapy clinic.       Patient Instructions   Notify me in 1-2 months on Mychart if the pills are working better and only having  1 week of bleeding.  If it hasn't gotten we will have you see a gynecologist.        Orders Placed This Encounter   Procedures     Influenza, Seasonal,Quad Inj, 36+ MOS     Order Specific Question:   Counseling provided to include answering patients questions and/or preemptively discussing the risks and benefits of all components.     Answer:   Yes     Ambulatory referral to Adult PT- External     Referral Priority:   Routine     Referral Type:   Physical Therapy     Referral Reason:   Evaluation and Treatment     Requested Specialty:   Physical Therapy     Number of Visits Requested:   1     Medications Discontinued During This Encounter   Medication Reason     norgestimate-ethinyl estradiol (TRI-ESTARYLLA) 0.18/0.215/0.25 mg-35 mcg (28) Tab tablet        No Follow-up on file.    CHIEF COMPLAINT:  Chief Complaint   Patient presents with     Contraception     having menses 2 times a month       HISTORY OF PRESENT ILLNESS:  Na is a 18 y.o. female presenting to the clinic today with her mother. Pt started oral contraceptive in 2/2018. Pt is on birth control for her period regulation. Pt noticed improvement in her acne. Pt also notes recently her periods last for 2 weeks. Pt is suppose to have her period  "during the 4th week (placebo pills), but her period occurs during the 3rd week and continues throughout th 4th week.. Pt uses super tampon and changes 2 to 3 times a day. Pt has had minimal abdominal cramping during her periods. Pt used to play soccer everyday, but nothing else new to her workout routine. Fall soccer season has recently ended and she is now playing just 3 times per week. Endorses headache during her periods. Denies vaginal discharge or vaginal pruritis.    Pt also c/o lower back for the past year. Pt notes the pain worsens during soccer season due to the rigorous workouts.    REVIEW OF SYSTEMS:   All other systems are negative.    PFSH:   Na is typically healthy and UTD on immunizations.  With mother out of room, confidentially, Na tells me she has never had sexual intercourse.     Family History   Problem Relation Age of Onset     Hyperlipidemia Father      Basal cell carcinoma Mother      Anxiety disorder Sister      COPD Maternal Grandmother      Rheum arthritis Maternal Grandmother      Hypertension Maternal Grandmother      Depression Maternal Grandfather      Alzheimer's disease Maternal Grandfather      Prostate cancer Maternal Grandfather      Pacemaker Paternal Grandmother      Anxiety disorder Sister      No Medical Problems Sister        No past surgical history on file.    Social History       Social History Narrative    Lives at home with mother, father and 3 sisters.       TOBACCO USE:  History   Smoking Status     Never Smoker   Smokeless Tobacco     Never Used       VITALS:  Vitals:    11/09/18 1514   BP: 98/70   Patient Site: Left Arm   Patient Position: Sitting   Cuff Size: Adult Regular   Pulse: 66   Temp: 97.7  F (36.5  C)   TempSrc: Oral   Weight: 124 lb 11.2 oz (56.6 kg)   Height: 5' 3\" (1.6 m)     Wt Readings from Last 3 Encounters:   11/09/18 124 lb 11.2 oz (56.6 kg) (51 %, Z= 0.03)*   05/21/18 127 lb 14.4 oz (58 kg) (59 %, Z= 0.23)*   02/02/18 118 lb 4.8 oz (53.7 kg) " (41 %, Z= -0.22)*     * Growth percentiles are based on [facility] 2-20 Years data.     Body mass index is 22.09 kg/(m^2).    PHYSICAL EXAM:  Constitutional: She appears well-developed and well-nourished. She is awake, alert, and cooperative.  HEENT: Head: Normocephalic. Atraumatic.   Right Ear: Normal, pearly tympanic membrane; external ear and canal normal.    Left Ear: Normal, pearly tympanic membrane; external ear and canal normal.    Nose: Nose normal.    Mouth/Throat: Mucous membranes are moist. Oropharynx is clear. Tonsils +2 bilaterally. Normal dentition.   Eyes: Conjunctivae and lids are normal. PERRL, EOMI.   Neck: Supple without lymphadenopathy or tenderness. No thyromegaly or nodules.  Cardiovascular: Normal rate and regular rhythm. No murmur heard. Pulmonary: Clear to auscultation bilaterally. Effort and breath sounds normal. There is normal air entry.   Abdominal: Soft, nontender, nondistended. Bowel sounds are normal. No hepatosplenomegaly.    ADDITIONAL HISTORY SUMMARIZED (2): None.  DECISION TO OBTAIN EXTRA INFORMATION (1): None.   RADIOLOGY TESTS (1): None.  LABS (1): None.  MEDICINE TESTS (1): None.  INDEPENDENT REVIEW (2 each): None.       The visit lasted a total of 20 minutes that I spent face to face with the patient. Over 50% of the time was spent counseling and educating the patient about birth control.    By signing my name below, I, Perla Greenwood, attest that this documentation has been prepared under the direction and in the presence of Dr. Erum Brewster.  Electronic Signature: Huseyin Barnett. 11/9/2018 15:27.    I, Dr. Erum Brewster , personally performed the services described in this documentation. All medical record entries made by the scribe were at my direction and in my presence. I have reviewed the chart and discharge instructions (if applicable) and agree that the record reflects my personal performance and is accurate and complete.    MEDICATIONS:  Current Outpatient  Prescriptions   Medication Sig Dispense Refill     norethindrone-ethinyl estradiol (JUNEL FE 1/20, 28,) 1 mg-20 mcg (21)/75 mg (7) per tablet Take 1 tablet by mouth daily. 84 tablet 0     No current facility-administered medications for this visit.        Total data points: 0

## 2021-06-23 NOTE — PROGRESS NOTES
[facility] Well Child Check    ASSESSMENT & PLAN  Na De León is a 18 y.o. who has normal growth and normal development.    Diagnoses and all orders for this visit:    Encounter for annual health examination  -     PHQ9 Depression Screen  -     Chlamydia trachomatis & Neisseria gonorrhoeae, Amplified Detection    Dysmenorrhea treated with oral contraceptive    Menorrhagia with regular cycle    Chronic tension-type headache, not intractable    Other orders  -     Meningococcal B (PF)    Na currently does not have good control of her dysmenorrhea and menorrhagia symptoms on current OCPs.  She is interested in trying a new OCP for better control of cramping.  She continues to have significant cramping impacting her for several days of her period.   At this time, I have obtained Urine gonorrhea and chlamydia- negative.  Reports no sexual intercourse history  If no improvement of dysmenorrhea and menorrhagia by conclusion of 3 months, I recommend that she have follow up with gynecology so that she can have established plan of care prior to onset of college in August 2019. Discussed all with patient and mother.  Chlamydia and gonorrhea results called directly to patient cell phone by staff at [phone].  Recommend using ibuprofen 600 mg q 6 hours for cramping pain.    Reviewed hydration for headaches, muscle tension headaches.  Use ibuprofen no more than 3 times per week to help decrease risk of rebound headaches. Monitor stress; discusssed anxiety or stress both can be a common trigger for headaches.    Return to clinic in 1 year for a Well Child Check or sooner as needed    IMMUNIZATIONS/LABS  Immunizations were reviewed and orders were placed as appropriate.    REFERRALS  Dental:  The patient has already established care with a dentist.  Other:  No additional referrals were made at this time.    ANTICIPATORY GUIDANCE  I have reviewed age appropriate anticipatory guidance.    HEALTH HISTORY  Do you have  any concerns that you'd like to discuss today?: headaches a lot, worse when she has her cycle.    Na states that she periods have not improved with start of OCP for pain and menorrhagia.  She still requires at times ot have to change a pad or tampon q 2 hours in the first few days of her period. She utilzes 400 mg of ibuprofen to help with cramping, with minimal relief. She is open to trying a new Rx for OCP.  Her periods have regulated the timing of cycle nicely with use of OCP.    Accompanied by Mother Maggie       Do you have any significant health concerns in your family history?: Yes: prostate cancer father  Family History   Problem Relation Age of Onset     Hyperlipidemia Father      Basal cell carcinoma Mother      Anxiety disorder Sister      COPD Maternal Grandmother      Rheum arthritis Maternal Grandmother      Hypertension Maternal Grandmother      Depression Maternal Grandfather      Alzheimer's disease Maternal Grandfather      Prostate cancer Maternal Grandfather      Pacemaker Paternal Grandmother      Anxiety disorder Sister      No Medical Problems Sister      Since your last visit, have there been any major changes in your family, such as a move, job change, separation, divorce, or death in the family?: Yes: dad had a job change; dad was diagnosed with prostate cancer 1 year ago- is doing well. Grandfather passed away 18 months ago.  Has a lack of transportation kept you from medical appointments?: No    Home  Who lives in your home?:  Mom,dad,3 sisters, dogs  Social History     Social History Narrative    Lives at home with mother, father and 3 sisters.     Do you have any concerns about losing your housing?: No  Is your housing safe and comfortable?: Yes  Do you have any trouble with sleep?:  No    Education  What school do you child attend?:  [employer]  What grade are you in?:  12th  How do you perform in school (grades, behavior, attention, homework?: Good   Will be attending [employer] on  "Soccer scholarship (D2)   Is looking at the nursing program at [employer]    Eating  Do you eat regular meals including fruits and vegetables?:  yes  What are you drinking (cow's milk, water, soda, juice, sports drinks, energy drinks, etc)?: cow's milk- whole, water and soda  Have you been worried that you don't have enough food?: No  Do you have concerns about your body or appearance?:  No    Activities  Do you have friends?:  yes  Do you get at least one hour of physical activity per day?:  yes  How many hours a day are you in front of a screen other than for schoolwork (computer, TV, phone)?:  6  What do you do for exercise?:  Soccer,  Do you have interest/participate in community activities/volunteers/school sports?:  yes    MENTAL HEALTH SCREENING  PHQ-2 Total Score: 0 (2/1/2019  2:53 PM)    PHQ-9 Total Score: 0 (2/1/2019  2:53 PM)    Mother has concern for anxiety for Reegan, Reegan denies.  Mom states Reegan has always been \"intense\"     VISION/HEARING  Vision: Not done: age  Hearing:  Not done: age    No exam data present    TB Risk Assessment:  The patient and/or parent/guardian answer positive to:  patient and/or parent/guardian answer 'no' to all screening TB questions    Dyslipidemia Risk Screening  Have either of your parents or any of your grandparents had a stroke or heart attack before age 55?: No  Any parents with high cholesterol or currently taking medications to treat?: Yes: dad     Dental  When was the last time you saw the dentist?: 1-3 months ago   Last fluoride varnish application was within the past 30 days. Fluoride not applied today.      Patient Active Problem List   Diagnosis   (none) - all problems resolved or deleted       Drugs  Does the patient use tobacco/alcohol/drugs?:  yes, drinks alcohol, denies vaping or smoking nicotine products, denies marijuana use    Safety  Does the patient have any safety concerns (peer or home)?:  no  Does the patient use safety belts, helmets and " "other safety equipment?:  yes    Sex  Have you ever had sex?:  No    MEASUREMENTS  Height:  5' 3\" (1.6 m)  Weight: 126 lb (57.2 kg)  BMI: Body mass index is 22.32 kg/m .  Blood Pressure: 100/66  Blood pressure percentiles are 11 % systolic and 53 % diastolic based on the 2017 AAP Clinical Practice Guideline. Blood pressure percentile targets: 90: 125/78, 95: 128/81, 95 + 12 mmH/93.    PHYSICAL EXAM  Constitutional: She appears well-developed and well-nourished. She is awake, alert, and cooperative.  HEENT: Head: Normocephalic. Atraumatic.   Right Ear: Normal, pearly tympanic membrane; external ear and canal normal.    Left Ear: Normal, pearly tympanic membrane; external ear and canal normal.    Nose: Nose normal.    Mouth/Throat: Mucous membranes are moist. Oropharynx is clear. Tonsils +1 bilaterally. Normal dentition.   Eyes: Conjunctivae and lids are normal. PERRL, EOMI.   Neck: Supple without lymphadenopathy or tenderness. No thyromegaly or nodules.  Cardiovascular: Normal rate and regular rhythm. No murmur heard. Femoral pulses 2+ bilaterally.  Pulmonary: Clear to auscultation bilaterally. Effort and breath sounds normal. There is normal air entry.   Chest: Normal chest wall. Breast development is normal. SMR 5.   Abdominal: Soft, nontender, nondistended. Bowel sounds are normal. No hepatosplenomegaly.  Musculoskeletal: Moving all extremities with normal range of motion. Normal strength and tone. No abnormalities. No pain in the extremities.  Spine: Spine straight without curvature noted  Genitourinary: Normal external female genitalia. SMR 5.   Neurological: She is alert and oriented x3. She has normal reflexes. Normal tone and DTRs +2 bilaterally.  Psychiatric: She has a normal mood and affect. Her speech and behavior are normal.  Skin: Clear. No rashes or lesions noted.  "

## 2021-06-24 NOTE — TELEPHONE ENCOUNTER
Please call mom and let her know that the second meningitis B vaccine should be given at least 1 month after the first 1 or later.  Thanks Kimberly

## 2021-06-24 NOTE — TELEPHONE ENCOUNTER
Pt is on the [facility] Injection Schedule next Thursday 3/21 for Men B booster, received first dose 2/1/19. Please place future orders.   OTILIA WhittakerA

## 2021-06-24 NOTE — TELEPHONE ENCOUNTER
Question following Office Visit  When did you see your provider: 2/1/19  What is your question: Patient was given the meningococcal B shot and patients mother is wondering when she needs to come back for the booster shot. Please advise.  Okay to leave a detailed message: Yes

## 2021-06-24 NOTE — TELEPHONE ENCOUNTER
Left mom a message stating it has to be done at least one month after the first dose or later.   Angélica Gregorio, RMA

## 2021-06-25 NOTE — PROGRESS NOTES
Progress Notes by Yolanda Anand MD at 5/30/2017  3:00 PM     Author: Yolanda Anand MD Service: -- Author Type: Physician    Filed: 5/30/2017  5:58 PM Encounter Date: 5/30/2017 Status: Signed    : Yolanda Anand MD (Physician)       ASSESSMENT:  1. Concussion, without loss of consciousness, initial encounter      PLAN:  Patient Instructions       After a Concussion     Awaken to check alertness as often as the health care provider suggests.     If you or someone close to you has had a mild concussion (a head injury), watch closely for signs of problems during the first 48 hours after the injury. Follow the doctors advice about recovering at home. Use the tips on this handout as a guide.  Call 911 or your emergency number if the person with the concussion will not fully wake up or has seizures or convulsions.   The first 48 hours  Dont take medicine unless approved by your healthcare provider. Try placing a cold, damp cloth on the head to help relieve a headache.    Ask the doctor before using any medicines.    Don't drink alcohol or take sedatives or medicines that make you sleepy.    Don't return to sports or any activity that could cause you to hit your head until all symptoms are gone and you have been cleared by your doctor. A second head injury before fully recovering from the first one can lead to serious brain injury.    Avoid doing activities that require a lot of concentration or a lot of attention. This will allow your brain to rest and heal more quickly.    Return to regular physical and mental activity as directed and approved by your healthcare provider.  Tips about sleeping  For the first day or two, it may be best not to sleep for long periods of time without being checked for alertness. Follow the doctors instructions.  ? Wake every ____ hours for the next ____ hours. Ask questions to check for alertness.  ? OK to sleep through the night.  Note: A  person should not be left alone after a concussion. If no adult can stay with the injured person, let the doctor know.  When to call the doctor  If you notice any of the following, call the doctor or healthcare provider:    Vomiting (some vomiting is common, but tell the doctor about any vomiting)    Clear or bloody drainage from the nose or ear    Constant drowsiness or difficulty in waking up    Confusion or memory loss    Blurred vision or any vision changes    Inability to walk or talk normally    Increased weakness or problems with coordination    Constant, unrelieved headache that becomes more severe    Changes in behavior or personality    High-pitched crying in infants   Date Last Reviewed: 8/17/2015 2000-2017 [employer]. [address] [place] [address], [place] [address]. All rights reserved. This information is not intended as a substitute for professional medical care. Always follow your healthcare professional's instructions.            No orders of the defined types were placed in this encounter.    There are no discontinued medications.    No Follow-up on file.   I do think that she has concussion do well with this is mild with no loss of consciousness.  This was explained to the mother.  Will manage her conservatively.  Advised patient to reduce screen time for the next few days. Patient can continue to get back into soccer as tolerable after about 1-2 days of rest, she will slowly ease herself into for practice and if she is tolerating that can go on to games. Letter written for patient's school.     CHIEF COMPLAINT:  Chief Complaint   Patient presents with   ? Headache     possible concussion from soccer game from 5/29, sergo pelayo       HISTORY OF PRESENT ILLNESS:  Na is a 16 y.o. female presenting to the clinic today with her mother for evaluation of a headache. She was playing soccer competitively in [place] yesterday, and she fell backward during a tackle and hit the back of  her head. The injury occurred at around 9am on a soccer field outside before halftime. She did not lose consciousness. She continued to play soccer after the injury. After halftime, she was going to play soccer again, but she did not because she started to feel sick. She returned to [place] from [place] late last night. She has had headaches and nausea that are worse after eating. She denies vomiting. She has slight sensitivity to light. She feels dizzy when she stands up. The dizziness is as if things are moving around her. She had some neck pain after the injury, but not anymore. She does not have problems with vision or balance. She stayed home from school today. She has not had a concussion before. Her mother notes that some of her soccer teammates had colds and one had Strep throat while in [place]. She shared hotel rooms with her teammates. She denies ear pain and sore throat. Her next soccer game is not for another week or so.     REVIEW OF SYSTEMS:   Comprehensive review of systems negative except as noted above.    PFSH:  Reviewed, as below.     TOBACCO USE:  History   Smoking Status   ? Never Smoker   Smokeless Tobacco   ? Never Used       VITALS:  Vitals:    05/30/17 1504   BP: 110/70   Patient Site: Left Arm   Patient Position: Sitting   Pulse: 76   Weight: 120 lb 12.8 oz (54.8 kg)     Wt Readings from Last 3 Encounters:   05/30/17 120 lb 12.8 oz (54.8 kg) (50 %, Z= 0.00)*   02/12/16 115 lb (52.2 kg) (47 %, Z= -0.07)*   10/19/15 112 lb 9.6 oz (51.1 kg) (45 %, Z= -0.12)*     * Growth percentiles are based on CDC 2-20 Years data.     There is no height or weight on file to calculate BMI.    PHYSICAL EXAM:  General Appearance: Alert, cooperative, no distress, appears stated age  Head: Normocephalic, without obvious abnormality, atraumatic  Eyes: PERRL, conjunctiva/corneas clear, EOM's intact.  Ears: Normal TMs and external ear canals, both ears  Nose: Nares normal, septum midline, mucosa normal, no  drainage  Throat: Lips, mucosa, and tongue normal; teeth and gums normal  Neck: Supple, symmetrical, trachea midline, no adenopathy;  thyroid: not enlarged, symmetric, no tenderness/mass/nodules  Lungs: Clear to auscultation bilaterally, respirations unlabored  Heart: Regular rate and rhythm, S1 and S2 normal, no murmur, rub, or gallop  Lymph nodes: Cervical nodes normal  Neurologic:  Alert and oriented times 3. Normal reflexes. Cranial nerves II-XII intact.  Cerebellar function is also noted to be intact.  Psychiatric: Normal mood and affect.      ADDITIONAL HISTORY SUMMARIZED (2): None.  DECISION TO OBTAIN EXTRA INFORMATION (1): None.   RADIOLOGY TESTS (1): None.  LABS (1): None.  MEDICINE TESTS (1): None.  INDEPENDENT REVIEW (2 each): None.       The visit lasted a total of 23 minutes face to face with the patient. Over 50% of the time was spent counseling and educating the patient about concussion.    Stanislaw STEIN, am scribing for and in the presence of, Dr. Anand.    Dr. Cheng STEIN, personally performed the services described in this documentation, as scribed by Stanislaw Vasquez in my presence, and it is both accurate and complete.    MEDICATIONS:  No current outpatient prescriptions on file.     No current facility-administered medications for this visit.        Total data points: 0

## 2021-07-03 NOTE — ADDENDUM NOTE
Addendum Note by France Camacho CMA at 4/9/2019 10:33 AM     Author: Frnace Camacho CMA Service: -- Author Type: Certified Medical Assistant    Filed: 4/9/2019 10:33 AM Encounter Date: 4/8/2019 Status: Signed    : France Camacho CMA (Certified Medical Assistant)    Addended by: FRANCE CAMACHO on: 4/9/2019 10:33 AM        Modules accepted: Orders

## 2021-07-03 NOTE — ADDENDUM NOTE
Addendum Note by Erum Brewster MD at 4/9/2019  1:03 PM     Author: Erum Brewster MD Service: -- Author Type: Physician    Filed: 4/9/2019  1:03 PM Encounter Date: 4/8/2019 Status: Signed    : Erum Brewster MD (Physician)    Addended by: ERUM BREWSTER on: 4/9/2019 01:03 PM        Modules accepted: Orders

## 2021-07-03 NOTE — ADDENDUM NOTE
Addendum Note by Sue Morse at 7/6/2020  1:30 PM     Author: Sue Morse Service: -- Author Type:     Filed: 7/8/2020  8:21 AM Encounter Date: 7/6/2020 Status: Signed    : Sue Morse ()    Addended by: SUE MORSE on: 7/8/2020 08:21 AM        Modules accepted: Orders

## 2021-08-08 ENCOUNTER — HEALTH MAINTENANCE LETTER (OUTPATIENT)
Age: 21
End: 2021-08-08

## 2021-10-03 ENCOUNTER — HEALTH MAINTENANCE LETTER (OUTPATIENT)
Age: 21
End: 2021-10-03

## 2022-01-08 ENCOUNTER — MYC REFILL (OUTPATIENT)
Dept: PEDIATRICS | Facility: CLINIC | Age: 22
End: 2022-01-08

## 2022-01-08 DIAGNOSIS — G44.209 TENSION-TYPE HEADACHE, NOT INTRACTABLE, UNSPECIFIED CHRONICITY PATTERN: ICD-10-CM

## 2022-01-11 RX ORDER — BUTALBITAL, ACETAMINOPHEN AND CAFFEINE 50; 325; 40 MG/1; MG/1; MG/1
1 TABLET ORAL EVERY 4 HOURS PRN
Qty: 60 TABLET | Refills: 1 | Status: SHIPPED | OUTPATIENT
Start: 2022-01-11 | End: 2023-07-28

## 2022-01-11 NOTE — TELEPHONE ENCOUNTER
Routing refill request to provider for review/approval because:  Controlled substance request    Last Written Prescription Date:  3/23/21  Last Fill Quantity: 60,  # refills: 1   Last office visit provider:  4/12/21     Requested Prescriptions   Pending Prescriptions Disp Refills     butalbital-acetaminophen-caffeine (ESGIC) -40 MG tablet 60 tablet 1       There is no refill protocol information for this order          Elvin Gomez RN 01/11/22 10:37 AM

## 2022-02-17 ENCOUNTER — MEDICAL CORRESPONDENCE (OUTPATIENT)
Dept: HEALTH INFORMATION MANAGEMENT | Facility: CLINIC | Age: 22
End: 2022-02-17
Payer: COMMERCIAL

## 2022-02-17 ENCOUNTER — TELEPHONE (OUTPATIENT)
Dept: SURGERY | Facility: CLINIC | Age: 22
End: 2022-02-17
Payer: COMMERCIAL

## 2022-02-17 ENCOUNTER — TRANSCRIBE ORDERS (OUTPATIENT)
Dept: OTHER | Age: 22
End: 2022-02-17

## 2022-02-17 DIAGNOSIS — D3A.8 NEUROENDOCRINE TUMOR (H): Primary | ICD-10-CM

## 2022-02-17 DIAGNOSIS — D3A.8 NEUROENDOCRINE NEOPLASM OF APPENDIX (H): Primary | ICD-10-CM

## 2022-02-17 NOTE — TELEPHONE ENCOUNTER
Diagnosis, Referred by & from: Neuroendocrine Tumor, appendectomy 2/11   Appt date: 2/24/2022   NOTES STATUS DETAILS   OFFICE NOTE from referring provider N/A    OFFICE NOTE from other specialist N/A    DISCHARGE SUMMARY from hospital N/A    DISCHARGE REPORT from the ER Care Everywhere / Internal [facility]:  2/11/22 - ED OV with Dr. Wes Nolen - [place]:  12/28/17 - ED OV with Dr. Paz   OPERATIVE REPORT Care Everywhere [facility]:  2/12/22 - OP Note for LAPAROSCOPIC APPENDECTOMY with Dr. Aguero   MEDICATION LIST Care Everywhere    LABS     BIOPSIES/PATHOLOGY RELATED TO DIAGNOSIS Care Everywhere [facility]:  2/12/22 - Appendix Biopsy (Case: [id number])   DIAGNOSTIC PROCEDURES     IMAGING (DISC & REPORT)      CT Received [facility]:  2/11/22 - CT Abd/Pelvis     Records Requested  02/17/22    Facility  [facility]  Fax: [phone]  [place]  Fax: [phone]    Outcome * 2/17/22 9:33 AM Faxed urg req to [facility] for records to be faxed to the clinic. - [place]    * 2/17/22 10:28 AM Faxed urg req to [facility] for images to be pushed to [facility]. - [place]auri    * 2/17/22 11 AM Received message from [facility] stating they haven't seen this patient for anything yet. - Gauri    * 2/21/22 7:48 AM Faxed 2nd urg req to [facility] for images to be pushed to [facility]. - Gauri    * 2/21/22 12:35 PM Images received from [facility] and attached to the patient in PACs. - Gauri

## 2022-02-17 NOTE — TELEPHONE ENCOUNTER
[facility] Call Center    Phone Message    May a detailed message be left on voicemail: yes     Reason for Call: Other: Per Maggie would like to know what  has available for an appt. Please call Maggie back. Thank you.      Action Taken: Message routed to:  [facility]    Travel Screening: Not Applicable

## 2022-02-18 ENCOUNTER — ANCILLARY PROCEDURE (OUTPATIENT)
Dept: CT IMAGING | Facility: CLINIC | Age: 22
End: 2022-02-18
Attending: SURGERY
Payer: COMMERCIAL

## 2022-02-18 ENCOUNTER — LAB (OUTPATIENT)
Dept: LAB | Facility: CLINIC | Age: 22
End: 2022-02-18
Payer: COMMERCIAL

## 2022-02-18 DIAGNOSIS — D3A.8 NEUROENDOCRINE TUMOR (H): ICD-10-CM

## 2022-02-18 LAB
ALBUMIN SERPL-MCNC: 3.6 G/DL (ref 3.4–5)
ALP SERPL-CCNC: 34 U/L (ref 40–150)
ALT SERPL W P-5'-P-CCNC: 32 U/L (ref 0–50)
ANION GAP SERPL CALCULATED.3IONS-SCNC: 3 MMOL/L (ref 3–14)
AST SERPL W P-5'-P-CCNC: 20 U/L (ref 0–45)
BASOPHILS # BLD AUTO: 0 10E3/UL (ref 0–0.2)
BASOPHILS NFR BLD AUTO: 1 %
BILIRUB SERPL-MCNC: 0.2 MG/DL (ref 0.2–1.3)
BUN SERPL-MCNC: 11 MG/DL (ref 7–30)
CALCIUM SERPL-MCNC: 8.8 MG/DL (ref 8.5–10.1)
CANCER AG125 SERPL-ACNC: 11 U/ML (ref 0–30)
CEA SERPL-MCNC: <0.5 UG/L (ref 0–2.5)
CHLORIDE BLD-SCNC: 108 MMOL/L (ref 94–109)
CO2 SERPL-SCNC: 25 MMOL/L (ref 20–32)
CREAT SERPL-MCNC: 0.85 MG/DL (ref 0.52–1.04)
EOSINOPHIL # BLD AUTO: 0.1 10E3/UL (ref 0–0.7)
EOSINOPHIL NFR BLD AUTO: 2 %
ERYTHROCYTE [DISTWIDTH] IN BLOOD BY AUTOMATED COUNT: 12.1 % (ref 10–15)
GFR SERPL CREATININE-BSD FRML MDRD: >90 ML/MIN/1.73M2
GLUCOSE BLD-MCNC: 69 MG/DL (ref 70–99)
HCT VFR BLD AUTO: 34.5 % (ref 35–47)
HGB BLD-MCNC: 11.2 G/DL (ref 11.7–15.7)
IMM GRANULOCYTES # BLD: 0 10E3/UL
IMM GRANULOCYTES NFR BLD: 0 %
LYMPHOCYTES # BLD AUTO: 1.8 10E3/UL (ref 0.8–5.3)
LYMPHOCYTES NFR BLD AUTO: 35 %
MCH RBC QN AUTO: 29.5 PG (ref 26.5–33)
MCHC RBC AUTO-ENTMCNC: 32.5 G/DL (ref 31.5–36.5)
MCV RBC AUTO: 91 FL (ref 78–100)
MONOCYTES # BLD AUTO: 0.5 10E3/UL (ref 0–1.3)
MONOCYTES NFR BLD AUTO: 10 %
NEUTROPHILS # BLD AUTO: 2.8 10E3/UL (ref 1.6–8.3)
NEUTROPHILS NFR BLD AUTO: 52 %
NRBC # BLD AUTO: 0 10E3/UL
NRBC BLD AUTO-RTO: 0 /100
PLATELET # BLD AUTO: 308 10E3/UL (ref 150–450)
POTASSIUM BLD-SCNC: 4.3 MMOL/L (ref 3.4–5.3)
PREALB SERPL IA-MCNC: 21 MG/DL (ref 15–45)
PROT SERPL-MCNC: 6.8 G/DL (ref 6.8–8.8)
RBC # BLD AUTO: 3.8 10E6/UL (ref 3.8–5.2)
SODIUM SERPL-SCNC: 136 MMOL/L (ref 133–144)
WBC # BLD AUTO: 5.2 10E3/UL (ref 4–11)

## 2022-02-18 PROCEDURE — 85025 COMPLETE CBC W/AUTO DIFF WBC: CPT | Performed by: PATHOLOGY

## 2022-02-18 PROCEDURE — 86304 IMMUNOASSAY TUMOR CA 125: CPT | Mod: 90 | Performed by: PATHOLOGY

## 2022-02-18 PROCEDURE — 36415 COLL VENOUS BLD VENIPUNCTURE: CPT | Performed by: PATHOLOGY

## 2022-02-18 PROCEDURE — 86301 IMMUNOASSAY TUMOR CA 19-9: CPT | Mod: 90 | Performed by: PATHOLOGY

## 2022-02-18 PROCEDURE — 99000 SPECIMEN HANDLING OFFICE-LAB: CPT | Performed by: PATHOLOGY

## 2022-02-18 PROCEDURE — 84134 ASSAY OF PREALBUMIN: CPT | Performed by: PATHOLOGY

## 2022-02-18 PROCEDURE — 82378 CARCINOEMBRYONIC ANTIGEN: CPT | Mod: 90 | Performed by: PATHOLOGY

## 2022-02-18 PROCEDURE — 71260 CT THORAX DX C+: CPT | Mod: GC | Performed by: RADIOLOGY

## 2022-02-18 PROCEDURE — 80053 COMPREHEN METABOLIC PANEL: CPT | Performed by: PATHOLOGY

## 2022-02-18 RX ORDER — IOPAMIDOL 755 MG/ML
59 INJECTION, SOLUTION INTRAVASCULAR ONCE
Status: COMPLETED | OUTPATIENT
Start: 2022-02-18 | End: 2022-02-18

## 2022-02-18 RX ADMIN — IOPAMIDOL 59 ML: 755 INJECTION, SOLUTION INTRAVASCULAR at 09:05

## 2022-02-18 NOTE — PROGRESS NOTES
"Colon and Rectal Surgery Clinic Note    RE: Na De León.  : 2000.  DEVEN: 2022.    Reason for visit: newly diagnosed neuroendocrine tumor of the appendix     HPI: Na is a 21 year old lady who underwent a laparoscopic appendectomy on 2022 by Dr Bob Aguero at [facility] in [place]. His operative report describes an otherwise uneventful appendectomy, with no evidence for perforation.  He noted inflammation of the distal half of the appendix and no other concerning findings.  Final pathology ultimately revealed a well-differentiated neuroendocrine tumor (grade 1 of 3), about 1 cm from proximal resection.  It invaded into the serosa and there was one (of two) positive lymph nodes.  Of note, CT abd/pelvis performed for appendicitis workup did not show any evidence of hepatic metastases nor peritoneal spread.  She has since had CT chest with findings of some small pulmonary nodules, but no evidence of metastatic disease.    Overall she is feeling great.  She has been recovering well from her appendectomy.  She reports no abdominal pain.  She does not have diarrhea, flushing, hot flashes, or any other \"carcinoid syndrome\" type symptoms.  She has normal bowel movements, about one soft solid movement per day.  She is otherwise in great health.      CT abdomen/pelvis (2022):  IMPRESSION:   1. Acute appendicitis, as described above. No abscess, obstruction or free air.   2. Additional findings, as above.       CT Chest (2022):  Lungs: No pneumothorax, pleural effusion, or focal airspace opacity.  Pulmonary nodules as below:  -5 mm groundglass nodule in the right lower lobe (series 4 image 205)  -4 mm groundglass nodule in the right lower lobe (series 4 image 127)  -2 mm solid nodule in the right lower lobe (series 4 image 137)  Airways: Central tracheobronchial tree is clear.  Vessels: Main pulmonary artery and aorta are normal in caliber. The  left vertebral artery has an origin off the " aortic arch.  Heart: Heart size is normal without pericardial effusion. No  significant coronary calcium.  Lymph nodes: No suspicious mediastinal or hilar lymphadenopathy.  Thyroid: Within normal limits.  Esophagus: Within normal limits  Impression:   1. Sub-6 mm pulmonary nodules as above, recommend short interval  follow-up to ensure stability.  2. No definite evidence of metastatic disease in the chest.      Pathology (2/12/2022):  Appendix, appendectomy:  - Well-differentiated neuroendocrine tumor (carcinoid tumor), grade 1 (of 3) forming a 1.7 cm mass 1 cm from the proximal resection margin  - The tumor invades the serosa  - The resection margins are negative (proximal and mesoappendix margins)  - One lymph node is positive for tumor (1/2)       Labs (2/18/2022):  CEA: <0.5   : 11  CA 19-9: 9  Chromogranin A: 48      SSR-Dotatate scheduled for 3/4/2022    Medical history:  1. Anxiety   2. Depression     Surgical history:  1. Laparoscopic Appendectomy 2/11/2022    Family history:  Family History   Problem Relation Age of Onset     Hyperlipidemia Father      Basal cell carcinoma Mother      Anxiety Disorder Sister      Chronic Obstructive Pulmonary Disease Maternal Grandmother      Rheumatoid Arthritis Maternal Grandmother      Hypertension Maternal Grandmother      Depression Maternal Grandfather      Alzheimer Disease Maternal Grandfather      Prostate Cancer Maternal Grandfather      Pacemaker Paternal Grandmother      Anxiety Disorder Sister      No Known Problems Sister        Medications:  Current Outpatient Medications   Medication Sig Dispense Refill     amitriptyline (ELAVIL) 10 MG tablet [AMITRIPTYLINE (ELAVIL) 10 MG TABLET]        buPROPion (WELLBUTRIN XL) 150 MG 24 hr tablet [BUPROPION (WELLBUTRIN XL) 150 MG 24 HR TABLET] Take 1 tablet (150 mg total) by mouth daily. 90 tablet 3     butalbital-acetaminophen-caffeine (ESGIC) -40 MG tablet Take 1 tablet by mouth every 4 hours as needed for  headaches 60 tablet 1     etonogestrel (NEXPLANON) 68 mg Impl implant [ETONOGESTREL (NEXPLANON) 68 MG IMPL IMPLANT] 1 each by Subdermal route once. Inserted 1/8/20 by Dr. Rosalina Nicholas       ondansetron (ZOFRAN-ODT) 4 MG disintegrating tablet [ONDANSETRON (ZOFRAN-ODT) 4 MG DISINTEGRATING TABLET] Take 1 tablet (4 mg total) by mouth every 8 (eight) hours as needed for nausea. 30 tablet 0       Allergies:  Allergies   Allergen Reactions     Other Environmental Allergy Unknown     Seasonal       Social history:   Marital status: single.  No smoking  She is from [place] but is currently in school in [place] [facility].  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  Her parents are present with her today.    ROS:  A complete review of systems was performed with the patient and all systems negative except as per HPI.    Physical Examination:  There were no vitals taken for this visit.  General: Well hydrated. No acute distress.  Abdomen: Soft, NT, well healed periumbilical, LLQ, and suprapubic scars.      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    Risks, benefits, and alternatives of operative treatment were thoroughly discussed with the patient, he/she understands these well and agrees to proceed.    PLAN  1. Lengthy discussion on the etiology, workup and management of neuroendocrine tumors of the appendix; specifically role of right hemicolectomy in order to gain adequate lymphadenectomy to guide further treatment.   As we already know there is a positive lymph node in her appendectomy specimen, I am not certain at this point if further value would be gained by removal of the right colon and more extended lymphadenectomy - on one hand this could possibly remove any further tumor deposits and give us more insight on the value of chemotherapy, on the other hand given her young age and lymph node  positivity in the appendectomy specimen I am almost certain she will be offered some form of chemotherapy by medical oncology.  I dicussed with the family that at this point I am leaning toward right hemicolectomy given her young age.  Dotatate scan and tumor board discussion pending.  2. Dotatate scan on 3/4/2022 - discussed with family that if there is evidence of metastatic disease in liver/lung/elsewhere there may be limited/no role for right hemicolectomy  3. Discussion at tumor board once the 3/4/22 dotatate scan is complete - I am very interested in obtaining my surgical colleagues and medical oncology viewpoint on the role/benefit of right hemicolectomy in the setting of lymph-node positive appendectomy specimen   4. Will consider colonoscopy but will await results of dotatate scan  5. Risks/benefits of minimally invasive right hemicolectomy reviewed with patient and her parents, including expected postoperative recovery and expectations, as well as potential risks including risk of anastomotic leak possibly requiring (multiple) reoperation(s) and (temporary) ostomy    >60 minutes spent on the date of the encounter doing chart review, history and exam, imaging review, documentation and further activities as noted above.      Jacinto Campbell MD, PhD    Division of Colon and Rectal Surgery  [facility]    Referring Provider:  No referring provider defined for this encounter.     Primary Care Provider:  Erum Brewster

## 2022-02-21 ENCOUNTER — TELEPHONE (OUTPATIENT)
Dept: PEDIATRICS | Facility: CLINIC | Age: 22
End: 2022-02-21
Payer: COMMERCIAL

## 2022-02-21 LAB — CANCER AG19-9 SERPL IA-ACNC: 9 U/ML

## 2022-02-21 NOTE — TELEPHONE ENCOUNTER
Spoke with dad/Elvin, he would like an insurance referral placed for services w/[facility] [place] for pt/Na. Na does have an apt with [facility]Dr Merino on 03.11.2022 but they do not want to wait that long for this apt. [facility] can get them in this week (?02.24.2022) if PCP Dr Emma Borrero places the insurance referral to Medic.    I did explain the process of an OON referral request to Elvin. He was anxious to have this completed ASAP so they can get Na in earlier rather than later. He stated that Na has a rare tumor that is only found in 3% of her age group and that [facility] is doing a study on this. Na was seen by [facility], where the tumor was found.    I did place OON recommendation request for [facility] and will notify Elvin once I hear back on the Medical Review's decision.

## 2022-02-22 NOTE — TELEPHONE ENCOUNTER
Jacob askew from [facility] Medical Review Team. Reached out to eli/Elvin to relay decision per [facility] Medical Review.     [facility] recommendation request for pt/[place]ecolby to be seen with [facility] has been approved for research consideration only. Advised to keep the appointment with [facility]/Dr Merino on 03.11.2022. If Na is not a candidate for the research study at [facility] then she will need to come back into the [facility] system as scheduled.    I did ask dad to call me back with [facility] provider Beth will be seeing in order for us to send the approval notification to [facility]. He stated he would do this.    Elvin is asking if we could send over the above info to him via email ([email or web address]) for their records and to have in hand for the apt to [facility].   Sent email to Luis M regarding [facility] request.

## 2022-02-23 LAB — CGA SERPL-MCNC: 48 NG/ML

## 2022-02-23 NOTE — TELEPHONE ENCOUNTER
Mom reached out and is going to be contacting [place] to find out which doctor she would be seeing and getting back to us.     Mom wanted to ensure there wasn't anything else that is needed to get the process going.

## 2022-02-24 ENCOUNTER — OFFICE VISIT (OUTPATIENT)
Dept: SURGERY | Facility: CLINIC | Age: 22
End: 2022-02-24
Payer: COMMERCIAL

## 2022-02-24 ENCOUNTER — TELEPHONE (OUTPATIENT)
Dept: SURGERY | Facility: CLINIC | Age: 22
End: 2022-02-24

## 2022-02-24 ENCOUNTER — PRE VISIT (OUTPATIENT)
Dept: SURGERY | Facility: CLINIC | Age: 22
End: 2022-02-24
Payer: COMMERCIAL

## 2022-02-24 VITALS
DIASTOLIC BLOOD PRESSURE: 69 MMHG | SYSTOLIC BLOOD PRESSURE: 106 MMHG | BODY MASS INDEX: 21.1 KG/M2 | HEART RATE: 82 BPM | OXYGEN SATURATION: 99 % | HEIGHT: 63 IN | TEMPERATURE: 97.9 F | WEIGHT: 119.1 LBS

## 2022-02-24 DIAGNOSIS — D3A.8 NEUROENDOCRINE NEOPLASM OF APPENDIX (H): Primary | ICD-10-CM

## 2022-02-24 PROCEDURE — 99205 OFFICE O/P NEW HI 60 MIN: CPT | Performed by: SURGERY

## 2022-02-24 RX ORDER — METRONIDAZOLE 500 MG/1
500 TABLET ORAL EVERY 6 HOURS
Qty: 3 TABLET | Refills: 0 | Status: ON HOLD | OUTPATIENT
Start: 2022-02-24 | End: 2022-04-15

## 2022-02-24 RX ORDER — ONDANSETRON 4 MG/1
4 TABLET, FILM COATED ORAL EVERY 6 HOURS
Qty: 3 TABLET | Refills: 0 | Status: ON HOLD | OUTPATIENT
Start: 2022-02-24 | End: 2022-04-15

## 2022-02-24 RX ORDER — NEOMYCIN SULFATE 500 MG/1
1000 TABLET ORAL EVERY 6 HOURS
Qty: 6 TABLET | Refills: 0 | Status: ON HOLD | OUTPATIENT
Start: 2022-02-24 | End: 2022-04-15

## 2022-02-24 RX ORDER — POLYETHYLENE GLYCOL 3350 17 G/17G
238 POWDER, FOR SOLUTION ORAL SEE ADMIN INSTRUCTIONS
Qty: 14 PACKET | Refills: 0 | Status: ON HOLD | OUTPATIENT
Start: 2022-02-24 | End: 2022-04-15

## 2022-02-24 ASSESSMENT — PAIN SCALES - GENERAL: PAINLEVEL: NO PAIN (0)

## 2022-02-24 NOTE — TELEPHONE ENCOUNTER
Recvd call from mom/Maggie, she is calling to notify me on the [place] providers that Na will be seeing;     Dr Miguel Babb    Na is scheduled with [facility] for 03.09.2022 now as they would like to wait for the PET scan that is scheduled for 03.04.2022 with the [facility].    I have sent this info to Luis M so she can submit everything to [employer]. Mom/Maggie will hold off until Tuesday, 03.01.2022 and then contact [facility] to confirm that they have recvd the request from [place] Medical Review so [facility] has it before the [facility] apt.    I did go over the OON recommendation request approval with Maggie so she was aware of it as well:    OON recommendation request has been approved for research consideration only with [facility]. I also let Maggie know that they should keep the apt for Na w/Dr Merino/[facility] that is scheduled on 03.11.2022.    I explained that if Na is not a candidate for the research study at [place] then she will need to come back into the [employer] system as scheduled.     Also told mom/Maggie to request to have the documentation from [facility] sent back to the [facility] so if there is any further review we will have the documentation for support.    Maggie stated her understanding with our conversation and will reach back out to me with any further questions or concerns.

## 2022-02-24 NOTE — NURSING NOTE
"Chief Complaint   Patient presents with     New Patient     New consutl for neuroendocrine tumor.       Vitals:    02/24/22 0855   BP: 106/69   BP Location: Left arm   Patient Position: Sitting   Cuff Size: Adult Regular   Pulse: 82   Temp: 97.9  F (36.6  C)   TempSrc: Oral   SpO2: 99%   Weight: 119 lb 1.6 oz   Height: 5' 3\"       Body mass index is 21.1 kg/m .          Courtney Callahan CMA            "

## 2022-02-24 NOTE — TELEPHONE ENCOUNTER
[facility] Call Center    Phone Message    May a detailed message be left on voicemail: yes     Reason for Call: Other: Reginald Serrano (mom) would like to reschedule the second opinion appt on 03/11 to either 03/17 or 03/18 is when the family is available for an appt. Please calll Maggie back to discuss. Thank you.      Action Taken: Message routed to:  [facility] Colon and rec    Travel Screening: Not Applicable

## 2022-02-24 NOTE — PATIENT INSTRUCTIONS
Follow up:    1. Medical Oncology referral. They will give you a call to schedule this appointment     2. We will discuss your case at our tumor board on 3/14/2022. We will call you if there are any changes in recommendations     3. Dotatate scan as scheduled     4. We will start planning for a surgery date.     5. Appointments you will need:   -pre op physical   -blood work   -COVID test    6. You will need to do a full bowel prep with antibiotics the day before surgery. I will send this to your pharmacy and you will receive the instructions via [employer] and mail.     SCAR Craig [phone]    Clinic Fax Number [phone]    Surgery Scheduling [phone]    My Chart is available 24 hours a day and is a secure way to access your records and communicate with your care team.  I strongly recommend signing up if you haven't already done so, if you are comfortable with computers.  If you would like to inquire about this or are having problems with My Chart access, you may call [phone] or go online at [email or web address].  Please allow at least 24 hours for a response and extra time on weekends and Holidays.

## 2022-02-24 NOTE — LETTER
"2022       RE: aN De León  [address] [facility][note 3]   [facility] [address]     Dear Colleague,    Thank you for referring your patient, Na De León, to the [facility] COLON AND RECTAL SURGERY CLINIC [facility]. Please see a copy of my visit note below.    Colon and Rectal Surgery Clinic Note    RE: Na De León.  : 2000.  DEVEN: 2022.    Reason for visit: newly diagnosed neuroendocrine tumor of the appendix     HPI: Na is a 21 year old lady who underwent a laparoscopic appendectomy on 2022 by Dr Bob Aguero at [facility] in [place]. His operative report describes an otherwise uneventful appendectomy, with no evidence for perforation.  He noted inflammation of the distal half of the appendix and no other concerning findings.  Final pathology ultimately revealed a well-differentiated neuroendocrine tumor (grade 1 of 3), about 1 cm from proximal resection.  It invaded into the serosa and there was one (of two) positive lymph nodes.  Of note, CT abd/pelvis performed for appendicitis workup did not show any evidence of hepatic metastases nor peritoneal spread.  She has since had CT chest with findings of some small pulmonary nodules, but no evidence of metastatic disease.    Overall she is feeling great.  She has been recovering well from her appendectomy.  She reports no abdominal pain.  She does not have diarrhea, flushing, hot flashes, or any other \"carcinoid syndrome\" type symptoms.  She has normal bowel movements, about one soft solid movement per day.  She is otherwise in great health.      CT abdomen/pelvis (2022):  IMPRESSION:   1. Acute appendicitis, as described above. No abscess, obstruction or free air.   2. Additional findings, as above.       CT Chest (2022):  Lungs: No pneumothorax, pleural effusion, or focal airspace opacity.  Pulmonary nodules as below:  -5 mm groundglass nodule in the " right lower lobe (series 4 image 205)  -4 mm groundglass nodule in the right lower lobe (series 4 image 127)  -2 mm solid nodule in the right lower lobe (series 4 image 137)  Airways: Central tracheobronchial tree is clear.  Vessels: Main pulmonary artery and aorta are normal in caliber. The  left vertebral artery has an origin off the aortic arch.  Heart: Heart size is normal without pericardial effusion. No  significant coronary calcium.  Lymph nodes: No suspicious mediastinal or hilar lymphadenopathy.  Thyroid: Within normal limits.  Esophagus: Within normal limits  Impression:   1. Sub-6 mm pulmonary nodules as above, recommend short interval  follow-up to ensure stability.  2. No definite evidence of metastatic disease in the chest.      Pathology (2/12/2022):  Appendix, appendectomy:  - Well-differentiated neuroendocrine tumor (carcinoid tumor), grade 1 (of 3) forming a 1.7 cm mass 1 cm from the proximal resection margin  - The tumor invades the serosa  - The resection margins are negative (proximal and mesoappendix margins)  - One lymph node is positive for tumor (1/2)       Labs (2/18/2022):  CEA: <0.5   : 11  CA 19-9: 9  Chromogranin A: 48      SSR-Dotatate scheduled for 3/4/2022    Medical history:  1. Anxiety   2. Depression     Surgical history:  1. Laparoscopic Appendectomy 2/11/2022    Family history:  Family History   Problem Relation Age of Onset     Hyperlipidemia Father      Basal cell carcinoma Mother      Anxiety Disorder Sister      Chronic Obstructive Pulmonary Disease Maternal Grandmother      Rheumatoid Arthritis Maternal Grandmother      Hypertension Maternal Grandmother      Depression Maternal Grandfather      Alzheimer Disease Maternal Grandfather      Prostate Cancer Maternal Grandfather      Pacemaker Paternal Grandmother      Anxiety Disorder Sister      No Known Problems Sister      Medications:  Current Outpatient Medications   Medication Sig Dispense Refill     amitriptyline  (ELAVIL) 10 MG tablet [AMITRIPTYLINE (ELAVIL) 10 MG TABLET]        buPROPion (WELLBUTRIN XL) 150 MG 24 hr tablet [BUPROPION (WELLBUTRIN XL) 150 MG 24 HR TABLET] Take 1 tablet (150 mg total) by mouth daily. 90 tablet 3     butalbital-acetaminophen-caffeine (ESGIC) -40 MG tablet Take 1 tablet by mouth every 4 hours as needed for headaches 60 tablet 1     etonogestrel (NEXPLANON) 68 mg Impl implant [ETONOGESTREL (NEXPLANON) 68 MG IMPL IMPLANT] 1 each by Subdermal route once. Inserted 1/8/20 by Dr. Rosalina Nicholas       ondansetron (ZOFRAN-ODT) 4 MG disintegrating tablet [ONDANSETRON (ZOFRAN-ODT) 4 MG DISINTEGRATING TABLET] Take 1 tablet (4 mg total) by mouth every 8 (eight) hours as needed for nausea. 30 tablet 0       Allergies:  Allergies   Allergen Reactions     Other Environmental Allergy Unknown     Seasonal       Social history:   Marital status: single.  No smoking  She is from [place] but is currently in school in [facility].  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  Her parents are present with her today.    ROS:  A complete review of systems was performed with the patient and all systems negative except as per HPI.    Physical Examination:  There were no vitals taken for this visit.  General: Well hydrated. No acute distress.  Abdomen: Soft, NT, well healed periumbilical, LLQ, and suprapubic scars.    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    Risks, benefits, and alternatives of operative treatment were thoroughly discussed with the patient, he/she understands these well and agrees to proceed.    PLAN  1. Lengthy discussion on the etiology, workup and management of neuroendocrine tumors of the appendix; specifically role of right hemicolectomy in order to gain adequate lymphadenectomy to guide further treatment.   As we already know there is a positive lymph node in her  appendectomy specimen, I am not certain at this point if further value would be gained by removal of the right colon and more extended lymphadenectomy - on one hand this could possibly remove any further tumor deposits and give us more insight on the value of chemotherapy, on the other hand given her young age and lymph node positivity in the appendectomy specimen I am almost certain she will be offered some form of chemotherapy by medical oncology.  I dicussed with the family that at this point I am leaning toward right hemicolectomy given her young age.  Dotatate scan and tumor board discussion pending.  2. Dotatate scan on 3/4/2022 - discussed with family that if there is evidence of metastatic disease in liver/lung/elsewhere there may be limited/no role for right hemicolectomy  3. Discussion at tumor board once the 3/4/22 dotatate scan is complete - I am very interested in obtaining my surgical colleagues and medical oncology viewpoint on the role/benefit of right hemicolectomy in the setting of lymph-node positive appendectomy specimen   4. Will consider colonoscopy but will await results of dotatate scan  5. Risks/benefits of minimally invasive right hemicolectomy reviewed with patient and her parents, including expected postoperative recovery and expectations, as well as potential risks including risk of anastomotic leak possibly requiring (multiple) reoperation(s) and (temporary) ostomy    >60 minutes spent on the date of the encounter doing chart review, history and exam, imaging review, documentation and further activities as noted above.      Jacinto Campbell MD, PhD    Division of Colon and Rectal Surgery  [facility]    Referring Provider:  No referring provider defined for this encounter.     Primary Care Provider:  Erum Brewster

## 2022-02-28 ENCOUNTER — TUMOR CONFERENCE (OUTPATIENT)
Dept: ONCOLOGY | Facility: CLINIC | Age: 22
End: 2022-02-28
Payer: COMMERCIAL

## 2022-02-28 NOTE — TUMOR CONFERENCE
Tumor Conference Information  Tumor Conference: Colorectal  Specialties Present: Medical oncology, Radiation oncology, Radiology, Surgery  Patient Status: A new patient  Pathology: Discussed (see comment) (Comment: Well-differentiated neuroendocrine tumor )  Treatment to Date: Scope (see comment), None  Clinical Trial Eligibility: Not discussed  Recommended Plan: Surgery, Additional testing (see comment) (Comment: Dotatate scan as scheduled)  Did the review exceed 30 minutes?: did not           Documentation / Disclaimer Cancer Tumor Board Note  Cancer tumor board recommendations do not override what is determined to be reasonable care and treatment, which is dependent on the circumstances of a patient's case; the patient's medical, social, and personal concerns; and the clinical judgment of the oncologist [physician].

## 2022-03-01 ENCOUNTER — TELEPHONE (OUTPATIENT)
Dept: SURGERY | Facility: CLINIC | Age: 22
End: 2022-03-01
Payer: COMMERCIAL

## 2022-03-01 DIAGNOSIS — Z11.59 ENCOUNTER FOR SCREENING FOR OTHER VIRAL DISEASES: Primary | ICD-10-CM

## 2022-03-01 NOTE — TELEPHONE ENCOUNTER
FUTURE VISIT INFORMATION      SURGERY INFORMATION:    Date: 3/15/22    Location: [facility] OR    Surgeon:  Jacinto Campbell MD    Anesthesia Type:  general    Procedure: Robotic possible laparscopic right hemicolectomy    Consult: ov     RECORDS REQUESTED FROM:        Primary Care Provider: Emma Borrero MD- [facility]     Most recent EKG+ Tracin20- thang

## 2022-03-01 NOTE — TELEPHONE ENCOUNTER
Tier 2 Case Request received to schedule:    Patient Name: Na De León   MRN: [id number]   Case#: [id number]   Surgeon(s) and Role:      * Jacinto Campbell MD - Primary   Date requested: * No dates entered *   Location:  OR   Procedure(s):   Robotic possible laparscopic right hemicolectomy (N/A)     Additional Instructions for the Case  Multi-surgeon case:  no  Time in minutes:  240  Anesthesia: general  Prep: full prep with antibiotics   Pre-Op: PAC  Labs: yes  WOC: no  Special equipment: robot but okay to do lap if you cant get a robot   Special Instructions: parents are very anxious     Schedule with Heena Triana NP 2-3 weeks after surgery.  Schedule with Surgeon 3-4 weeks after Heena Triana NP    On 3/29/2022:  Patient's dad Elvin called in, wanted an update on wait list date status. I called OR scheduling, Christine says that we have a robot, but no room as of yet. OR Supervisor Coleen says she's l

## 2022-03-02 ENCOUNTER — OFFICE VISIT (OUTPATIENT)
Dept: FAMILY MEDICINE | Facility: CLINIC | Age: 22
End: 2022-03-02
Payer: COMMERCIAL

## 2022-03-02 VITALS
WEIGHT: 120.4 LBS | DIASTOLIC BLOOD PRESSURE: 58 MMHG | OXYGEN SATURATION: 100 % | BODY MASS INDEX: 21.33 KG/M2 | HEART RATE: 99 BPM | SYSTOLIC BLOOD PRESSURE: 90 MMHG

## 2022-03-02 DIAGNOSIS — Z87.19 HISTORY OF APPENDICITIS: Primary | ICD-10-CM

## 2022-03-02 DIAGNOSIS — D3A.8 NEUROENDOCRINE NEOPLASM OF APPENDIX (H): ICD-10-CM

## 2022-03-02 PROCEDURE — 90715 TDAP VACCINE 7 YRS/> IM: CPT | Performed by: FAMILY MEDICINE

## 2022-03-02 PROCEDURE — 90471 IMMUNIZATION ADMIN: CPT | Performed by: FAMILY MEDICINE

## 2022-03-02 PROCEDURE — 99213 OFFICE O/P EST LOW 20 MIN: CPT | Mod: 25 | Performed by: FAMILY MEDICINE

## 2022-03-02 NOTE — PROGRESS NOTES
Assessment & Plan     History of appendicitis  Doing well postoperatively    Neuroendocrine neoplasm of appendix  She is being followed by surgery and oncology and planning next steps in treatment.                   No follow-ups on file.    Delmy Arana MD  [facility] SARAH Monzon is a 21 year old who presents for the following health issues     HPI   She comes in today for hospital and surgery follow-up visit.  She underwent a laparoscopic appendectomy on 2/12/2022 at [facility] in [place].  Surgery was uneventful.  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  She is currently undergoing further evaluation of this and planning the next steps in treatment.  Surgery has been recommended but she is not sure when this will occur.    She is currently staying at home and completing school online so that she can attend her medical appointments.  She reports that she has fully recovered from her surgery.  Surgical incisions are healed.  Energy is good.  Appetite is normal.  She is not having any symptoms of chest pain or shortness of breath.  Bowel movements are normal.  Urination is normal.  No lower extremity edema.  No dizziness or lightheadedness.  She has no other concerns or questions.  Medications and allergies are reviewed and updated.        Review of Systems         Objective    BP 90/58 (BP Location: Right arm, Cuff Size: Adult Regular)   Pulse 99   Wt 54.6 kg (120 lb 6.4 oz)   LMP 02/26/2022   SpO2 100%   BMI 21.33 kg/m    Body mass index is 21.33 kg/m .  Physical Exam   GENERAL: healthy, alert and no distress  RESP: lungs clear to auscultation - no rales, rhonchi or wheezes  CV: regular rate and rhythm, normal S1 S2, no S3 or S4, no murmur, click or rub, no peripheral edema and peripheral pulses strong  ABDOMEN: soft, nontender, no  hepatosplenomegaly, no masses and bowel sounds normal, incisions healing well  MS: no gross musculoskeletal defects noted, no edema  PSYCH: mentation appears normal, affect normal/bright

## 2022-03-02 NOTE — TELEPHONE ENCOUNTER
Diagnosis, Referred by & from: Neuroendocrine tumor appendix, wants 2nd opinion first with [facility]   Appt date: 4/4/2022   NOTES STATUS DETAILS   OFFICE NOTE from referring provider N/A    OFFICE NOTE from other specialist Care Everywhere / Internal [facility]:  ([facility]) 3/18/22 - PANC OV with Dr. Camarena  3/9/22 - ONC OV with Dr. Castaneda    [facility]:  ([facility]) 3/14/22 - CR Tumor Conference  3/7/22 - ONC OV with Dr. Mera  2/24/22 - CR OV with Dr. Shannan Nolen [facility]:  3/2/22 - PCC OV with Dr. Arana   DISCHARGE SUMMARY from hospital N/A    DISCHARGE REPORT from the ER Care Everywhere / Internal [facility]:  2/11/22 - ED OV with Dr. Wes Nolen [facility]:  12/28/17 - ED OV with Dr. Paz   OPERATIVE REPORT Care Everywhere [facility]:  2/12/22 - OP Note for LAPAROSCOPIC APPENDECTOMY with Dr. Aguero   MEDICATION LIST Internal    LABS     ANAL PAP/CEA Internal [facility]:  2/18/22 - CEA   BIOPSIES/PATHOLOGY RELATED TO DIAGNOSIS Care Everywhere [facility]:  2/12/22 - Appendix Biopsy (Case: [id number])   DIAGNOSTIC PROCEDURES     IMAGING (DISC & REPORT)      CT Received / Internal [facility]:  3/4/22 - PET/CT  2/18/22 - CT Chest    [facility]:  2/11/22 - CT Abd/Pelvis

## 2022-03-02 NOTE — TELEPHONE ENCOUNTER
FUTURE VISIT INFORMATION        SURGERY INFORMATION:    Date:  2022     RECORDS REQUESTED FROM:          Primary Care Provider: Emma Borrero MD- [facility]      Most recent EKG+ Tracin20- thang

## 2022-03-03 ENCOUNTER — PATIENT OUTREACH (OUTPATIENT)
Dept: SURGERY | Facility: CLINIC | Age: 22
End: 2022-03-03
Payer: COMMERCIAL

## 2022-03-03 ENCOUNTER — TELEPHONE (OUTPATIENT)
Dept: GASTROENTEROLOGY | Facility: CLINIC | Age: 22
End: 2022-03-03
Payer: COMMERCIAL

## 2022-03-03 DIAGNOSIS — Z12.11 ENCOUNTER FOR SCREENING COLONOSCOPY: ICD-10-CM

## 2022-03-03 DIAGNOSIS — D3A.8 NEUROENDOCRINE NEOPLASM OF APPENDIX (H): Primary | ICD-10-CM

## 2022-03-03 NOTE — TELEPHONE ENCOUNTER
RECORDS STATUS - ALL OTHER DIAGNOSIS      Action    Action Taken 3/7/22  Marjorie castro/ [facility]io [facility]hm[facility] path lab - slides are currently at the [facility]. They will send them to us once [facility] returns them/they will reach out to [facility] to ask for them back.  10:59 AM         RECORDS RECEIVED FROM: Rogelio Jalloh   DATE RECEIVED:    NOTES STATUS DETAILS   OFFICE NOTE from referring provider Jacinto Acuna MD in [facility] COLON & RECTAL SURGERY: 22   DISCHARGE REPORT from the ER [facility] - Rogelio 22, 22, 21   OPERATIVE REPORT  - Rogelio 22: Appendectomy   MEDICATION LIST [facility]  Rogelio    LABS     PATHOLOGY REPORTS Rogelio/Galdino, Report in CE, slides requested 3/3  [employer]  22: [id number]   ANYTHING RELATED TO DIAGNOSIS Epic 22   IMAGING (NEED IMAGES & REPORT)     CT SCANS PACS 22: Epic    22: oRgelio   PET Scheduled @  3/4/2: Epic

## 2022-03-03 NOTE — TELEPHONE ENCOUNTER
Screening Questions  BlueKIND OF PREP RedLOCATION [review exclusion criteria] GreenSEDATION TYPE    1. Have you had a positive covid test in the last 90 days? N     2. Are you active on mychart? Y    3. What insurance is in the chart? MEDICA     3.  Ordering/Referring Provider:  Jacinto Campbell MD   4. BMI 21.3 [BMI OVER 40-EXTENDED PREP]  If greater than 40 review exclusion criteria [PAC APPT IF @ [facility]]    5.  Respiratory Screening :  [If yes to any of the following HOSPITAL setting only]     Do you use daily home oxygen? N    Do you have mod to severe Obstructive Sleep Apnea? N  [OKAY @ [facility] SH PH RI]   Do you have Pulmonary Hypertension? N     Do you have UNCONTROLLED asthma? N      6. Have you had a heart or lung transplant? N      7. Are you currently on dialysis? N [ If yes, G-PREP & HOSPITAL setting only]     8. Do you have chronic kidney disease? N [ If yes, G-PREP ]    9. Have you had a stroke or Transient ischemic attack (TIA) within 6 months? N (If yes, do not schedule at [facility])    10. In the past 6 months, have you had any heart related issues including cardiomyopathy or heart attack? N        If yes, did it require cardiac stenting or other implantable device?       11. Do you have any implantable devices in your body (pacemaker, defib, LVAD)? N (If yes, schedule at [facility])    12. Do you take nitroglycerin? N   If yes, how often?   (if yes, HOSPITAL setting ONLY)    13. Are you currently taking any blood thinners? N   [IF YES, INFORM PATIENT TO FOLLOW UP W/ ORDERING PROVIDER FOR BRIDGING INSTRUCTIONS]     14. Are you a diabetic? N   [ If yes, G-PREP ]    15. [FEMALES] Are you currently pregnant? N    If yes, how many weeks?     16. Are you taking any prescription pain medications on a routine schedule?  N  [ If yes, EXTENDED PREP.] [If yes, MAC]    17. Do you have any chemical dependencies such as alcohol, street drugs, or methadone?  N [If yes, MAC]    18. Do you have any history of  post-traumatic stress syndrome, severe anxiety or history of psychosis?  N  [If yes, MAC]    19. Do you transfer independently?  Y    20.  Do you have any issues with constipation?  N  [ If yes, EXTENDED PREP.]    21. Preferred LOCAL Pharmacy for Pre Prescription     [id number] [place] [address]    Scheduling Details      Caller : ALEXANDER MOTHER  (Please ask for phone number if not scheduled by patient)    Type of Procedure Scheduled: COLON  Which Colonoscopy Prep was Sent?: ERIK CURRIE CF PATIENTS & GROEN'S PATIENTS NEEDS EXTENDED PREP  Surgeon: SEEMA  Date of Procedure: 4/6  Location: [facility]      Sedation Type: MAC  Conscious Sedation- Needs  for 6 hours after the procedure  MAC/General-Needs  for 24 hours after procedure    Pre-op Required at [facility] [place], [place] and OR for MAC sedation: N  (advise patient they will need a pre-op prior to procedure -)      Informed patient they will need an adult  Y  Cannot take any type of public or medical transportation alone    [facility]-Pro[facility] or Externally: 4/2    Confirmed Nurse will call to complete assessment Y    Additional comments:

## 2022-03-03 NOTE — PROGRESS NOTES
Called Na to let her know that Dr. Campbell will move forward with a colonoscopy. Placed orders. Sent to scheduling

## 2022-03-04 ENCOUNTER — HOSPITAL ENCOUNTER (OUTPATIENT)
Dept: PET IMAGING | Facility: CLINIC | Age: 22
Setting detail: NUCLEAR MEDICINE
Discharge: HOME | End: 2022-03-04
Attending: SURGERY | Admitting: SURGERY
Payer: COMMERCIAL

## 2022-03-04 DIAGNOSIS — D3A.8 NEUROENDOCRINE TUMOR (H): ICD-10-CM

## 2022-03-04 PROCEDURE — 78816 PET IMAGE W/CT FULL BODY: CPT | Mod: 26 | Performed by: RADIOLOGY

## 2022-03-04 PROCEDURE — 78816 PET IMAGE W/CT FULL BODY: CPT | Mod: PS

## 2022-03-04 PROCEDURE — A9592 HC RX IP 250 OP 636: HCPCS | Performed by: SURGERY

## 2022-03-04 PROCEDURE — 250N000011 HC RX IP 250 OP 636: Performed by: SURGERY

## 2022-03-04 RX ADMIN — COPPER CU 64 DOTATATE 4.49 MCI.: 1 INJECTION, SOLUTION INTRAVENOUS at 14:24

## 2022-03-07 ENCOUNTER — PRE VISIT (OUTPATIENT)
Dept: SURGERY | Facility: CLINIC | Age: 22
End: 2022-03-07

## 2022-03-07 ENCOUNTER — PRE VISIT (OUTPATIENT)
Dept: ONCOLOGY | Facility: CLINIC | Age: 22
End: 2022-03-07

## 2022-03-07 ENCOUNTER — VIRTUAL VISIT (OUTPATIENT)
Dept: ONCOLOGY | Facility: CLINIC | Age: 22
End: 2022-03-07
Attending: SURGERY
Payer: COMMERCIAL

## 2022-03-07 DIAGNOSIS — D3A.8 NEUROENDOCRINE NEOPLASM OF APPENDIX (H): ICD-10-CM

## 2022-03-07 PROCEDURE — 99205 OFFICE O/P NEW HI 60 MIN: CPT | Mod: 95 | Performed by: INTERNAL MEDICINE

## 2022-03-07 NOTE — LETTER
3/7/2022         RE: Na De León  [address]   [place] [address]        Dear Colleague,    Thank you for referring your patient, Na De León, to the [facility]. Please see a copy of my visit note below.    Na is a 21 year old who is being evaluated via a billable video visit.      How would you like to obtain your AVS? MyChart  If the video visit is dropped, the invitation should be resent by: Send to e-mail at: [email or web address]  Will anyone else be joining your video visit? No    Suly Huertas         Service Date: 03/07/2022    MEDICAL ONCOLOGY NEW PATIENT VISIT    REFERRING PHYSICIAN:   Jacinto Campbell MD, Colorectal Surgery service, [facility].    CANCER DIAGNOSIS: Well-differentiated neuroendocrine tumor of the appendix, status post appendectomy at [facility] 02/11/2022 with negative margins and a low mitotic index.  The patient is kindly referred by Dr. Campbell for medical oncology evaluation and recommendations.    HISTORY OF PRESENT ILLNESS:  Ms. Na De León is a 21-year-old college student who goes to college in [employer], class of 2023.  She is at her parents' home in [place] and she is accompanied by her parents via [facility]-based virtual video visit.  She is kindly referred by Dr. Campbell for new diagnosis of well-differentiated neuroendocrine tumor, carcinoid subtype, of the appendix.    Ms. Na De León is a 21-year-old woman who has an otherwise unremarkable past medical history.  She has migraine headaches, as do her 3 sisters and her mother.  These occur and are exacerbated during the time of her menstrual cycle.  Other than that, she had wisdom teeth removed.  She had been having abdominal pain for a period of 2 days as of 02/11/2022.  This led to an emergency room presentation at [facility].  It was felt she might have acute cystitis without hematuria.       Further evaluation was done and included a CT abdomen and pelvis without contrast.  This revealed that there was acute appendicitis without abscess, obstruction or free air or any macroscopic evidence of perforation.  Dr. Bob Aguero, general surgeon at [facility], took the patient to the operating room for appendectomy.  He removed the appendix and there was the discovery of a well-differentiated neuroendocrine tumor, specifically a carcinoid tumor.  It was grade 1/3.  It was 1.7 cm in greatest diameter and the tumor was located 1 cm from the proximal resection margin.  Thus, the resection margins were all negative.  However, 1 of 2 lymph nodes resected did contain neuroendocrine tumor deposit.  The tumor did invade the serosa. On further histopathologic examination, the mitotic rate was less than 2 mitoses per 2 mm2.  Again, it was well differentiated, grade 1, located in the proximal half of the appendix.  There was no evidence of lymphovascular nor perineural invasion.  Two lymph nodes were taken out in total as described above.  Pathologically, this was described as a T4 N1 lesion.  There is no mention of any perforation seen.  She had subsequent followup including CT abdomen and pelvis and CT chest evaluated at the [facility].      She presented to the [facility] for further followup and met with Dr. Rosalio Campbell from our Colorectal Surgery team on 02/24/2022.  He reviewed her case and she confirms she does not have any carcinoid syndrome-like symptoms prior to or leading up to the diagnosis and surgery.  Specifically, she has not had any heart palpitations or chest pain, facial flushing or profuse or watery diarrhea or frequency of bowel movements.  He reviewed her CT scan and proceeded to discuss the patient's case at our Multidisciplinary Tumor Board 7 days ago.      At that discussion, the recommendation was made to proceed with a right hemicolectomy, particularly due to the  finding of involvement of a lymph node.  DOTATATE scan was ordered and scheduled and performed on 03/04/2022.  I personally reviewed the images and reviewed the results today with the patient and her parents in lay terms.  The scan was done on 03/04/2022 and showed no evidence of distant metastatic disease.  There were some benign physiologic activity of uptake in the pancreatic uncinate process but not indicative of any evidence of metastatic disease from the neuroendocrine tumor.  Chromogranin A was checked on 02/18/2022 in the postoperative setting and was 48.  It was about a week out from the surgery, normal range at that institution being 0-103.  CEA was unremarkable at less than 0.5.  A DOTATATE scan is as noted above.      The current plan is for right hemicolectomy to proceed tentatively on 04/12/2022.  In reviewing the medical records, I see it appears the patient and her family have requested a second opinion with Dr. Delgado Merino, another one of our colorectal surgeons here at the [facility] and have also potentially explored exploration of further opinions at the [facility].  She has few questions at this time.  Her understanding is there is an excellent prognosis.  Her parents, understandably, have strong concerns and other questions that I proceeded to answer to the best of my ability today.    PAST MEDICAL AND SURGICAL HISTORY:  Only notable for migraine headaches that her mother states that she has herself and also Na's 3 sisters.  For Na herself, she endorses that it happens and is exacerbated during menstrual cycles.  She has had wisdom teeth removed.  Otherwise, no significant surgeries aside from most obviously the appendectomy 02/11/2022 performed by Dr. Bob Aguero at [facility] [place] as noted above, diagnostic of a well-differentiated carcinoid tumor of the appendix.    FAMILY HISTORY:  Family history of malignancy includes her father who was on the call today who had  prostate cancer at 55.  He is now 58 and sounds to be doing well.  Paternal grandfather had prostate cancer at age 75 and  at 86 of unrelated causes.  Mother had basal cell carcinoma of the skin.  She has 3 sisters, 2 older, 1 younger.  All are in good health with no evidence of cancer.  There are no other familial germline mutations known.    SOCIAL HISTORY:  Na is staying currently with her parents in [place], [place].  She attends [employer].  She is majoring in business.  She is the class of .  Currently, doing remote online learning while recovering from her surgery and proceeding with the next steps.    REVIEW OF SYSTEMS:  Full 14-point review of systems was performed.  Pertinent symptoms are reviewed above per HPI.    MEDICATIONS/ALLERGIES:  Fully reviewed in Epic.  Current Outpatient Medications   Medication     buPROPion (WELLBUTRIN XL) 150 MG 24 hr tablet     butalbital-acetaminophen-caffeine (ESGIC) -40 MG tablet     etonogestrel (NEXPLANON) 68 mg Impl implant     metroNIDAZOLE (FLAGYL) 500 MG tablet     neomycin (MYCIFRADIN) 500 MG tablet     ondansetron (ZOFRAN) 4 MG tablet     polyethylene glycol (MIRALAX) 17 g packet     No current facility-administered medications for this visit.        Allergies   Allergen Reactions     Other Environmental Allergy Unknown     Seasonal       PHYSICAL EXAMINATION:  Physical exam could not be performed today in context of a Virtual Visit during the COVID19/Coronavirus pandemic.  Vitals - Patient Reported  Weight (Patient Reported): 54 kg (119 lb)  Pain Score: No Pain (1)         Observed physical assessments made today by visualizing the patient by video link:    General/Constitutional: Generally appears well, not acutely ill.  HEENT: no scleral icterus, not jaundiced.  Respiratory: no labored breathing.  Musculoskeletal: appears to have full range of motion and adequate physical strength.  Skin: no jaundice, discoloration or other notable  lesions.  Neurological: no evidence of tremors.  Psychiatric: no evident anxiety; fully alert and oriented with fluent speech.      The rest of a comprehensive physical examination is deferred due to PHE (public health emergency) video visit restrictions.    Pertinent labs, pathology and imaging were reviewed by me due to the nature of virtual video visit.  The pathology has not yet been received from [facility] and [facility], but we are awaiting review from our pathology team for confirmation of diagnosis and details.    IMPRESSION AND PLAN:  Ms. Na De León is a 21-year-old young woman with no significant past medical history or any family history of malignancy that I would align with MEN1 syndrome or anything else related to NET.  She has a new diagnosis of a neuroendocrine tumor, specifically well-differentiated carcinoid tumor of the appendix with a low mitotic rate.  It was removed during appendectomy when she initially presented with appendicitis on 02/11/2022.  It was fully resected with negative margins; however, although it was under 2 cm in size, specifically 1.7 cm, there was 1 of 2 lymph nodes involved with malignancy.  For that reason, a right hemicolectomy has been recommended, both for further therapy and for full pathologic staging of the tumor.        I proceeded to review the natural course, biology, diagnosis and treatment of neuroendocrine tumors in general and more specifically well-differentiated form of neuroendocrine tumor.  Her parents had specifically gone on the Internet and were under the impression that systemic chemotherapy, including specifically cytotoxic chemotherapy, would be recommended by me in the postoperative/adjuvant setting.  I stated that, in general, well-differentiated neuroendocrine tumors, especially those resected with intent to cure, would not merit any consideration of cytotoxic chemotherapy unless they were features of poorly differentiated or high-grade  features, none of which I see on the outside report.  We will confirm that once our pathology team reviews that.      This explanation seemed to provide some measure of reassurance.  I reviewed the DOTATATE scan and that did not show any evidence of distant metastatic disease.  They did have concern about whether or not there was evidence of potential spread, and I stated that, even if these types of cancer would go to the liver, which they most commonly would do if they were to spread, then surgery may be done with intent to cure or if there are other potential therapeutic approaches would be done rather than cytotoxic chemotherapy.  They stated full understanding.  I thought that even though this seems to be a nonfunctioning form of carcinoid tumor it would be prudent to get a preoperative transthoracic echocardiogram just to ensure there is no evidence of occult carcinoid hormone disease that would alter or create any vegetations on her cardiac valves.  I have ordered that today.  It will be done after 03/17 at her and her family's request as they wish to take a vacation and be able to be out of town.  They will move forward with the second opinion with Dr. Merino and then potentially the surgery with Dr. Campbell 04/12.  I invited them to certainly go to [facility] if they so wish for further opinion.  If she wishes to follow up with me, I would be happy to implement active surveillance following her surgery and review the outcome from the pathology review from the surgery.  I reviewed all the above in great detail and answered her and in particular her parents' questions to the best of my ability and they stated full understanding of my explanation by the end of the visit.    Thank you very much, Dr. Campbell for the kind referral.      I met with the patient, Na De León, Monday, 03/07/2022, between 10:50 a.m. and 11:35 a.m.      VIRTUAL VISIT - DETAILS:    I have reviewed the note as documented  above. This accurately captures the substance of my conversation with the patient.    Date of call: 2022   Start of call: 10:50 am  End of call: 11:35 am    Provider location: [facility] (academic office)  Patient location: Home      Mode of Video Visit: Rahul           I spent 45 minutes in consultation, including history and discussion with the patient including review of recent lab values and radiologic imaging results.  An additional 30 minutes was spent on the day of the visit, including reviewing pertinent medical notes and documentation from other physicians and APPs who have evaluated and treated this patient, pertinent lab values, pathology and imaging results, personal review of radiologic images, discussing the case with referring providers and/or nurse coordinator team, post-visit orders, and all post-visit documentation.    Paul Mera MD PhD            D: 2022   T: 2022   MT: [email or web address]    Name:     DELONTE DE LA CRUZ  MRN:      [id number]        Account:      [id number]   :      2000           Service Date: 2022       Document: [id number]        Again, thank you for allowing me to participate in the care of your patient.      Sincerely,    Paul Mera MD

## 2022-03-07 NOTE — PROGRESS NOTES
Service Date: 03/07/2022    MEDICAL ONCOLOGY NEW PATIENT VISIT    REFERRING PHYSICIAN:   Jacinto Campbell MD, Colorectal Surgery service, [facility].    CANCER DIAGNOSIS: Well-differentiated neuroendocrine tumor of the appendix, status post appendectomy at [facility] 02/11/2022 with negative margins and a low mitotic index.  The patient is kindly referred by Dr. Campbell for medical oncology evaluation and recommendations.    HISTORY OF PRESENT ILLNESS:  Ms. Na De León is a 21-year-old college student who goes to college in [employer], class of 2023.  She is at her parents' home in [place] and she is accompanied by her parents via [employer]-based virtual video visit.  She is kindly referred by Dr. Campbell for new diagnosis of well-differentiated neuroendocrine tumor, carcinoid subtype, of the appendix.    Ms. Na De León is a 21-year-old woman who has an otherwise unremarkable past medical history.  She has migraine headaches, as do her 3 sisters and her mother.  These occur and are exacerbated during the time of her menstrual cycle.  Other than that, she had wisdom teeth removed.  She had been having abdominal pain for a period of 2 days as of 02/11/2022.  This led to an emergency room presentation at [facility].  It was felt she might have acute cystitis without hematuria.      Further evaluation was done and included a CT abdomen and pelvis without contrast.  This revealed that there was acute appendicitis without abscess, obstruction or free air or any macroscopic evidence of perforation.  Dr. Bob Aguero, general surgeon at [facility], took the patient to the operating room for appendectomy.  He removed the appendix and there was the discovery of a well-differentiated neuroendocrine tumor, specifically a carcinoid tumor.  It was grade 1/3.  It was 1.7 cm in greatest diameter and the tumor was located 1 cm from the proximal resection  margin.  Thus, the resection margins were all negative.  However, 1 of 2 lymph nodes resected did contain neuroendocrine tumor deposit.  The tumor did invade the serosa. On further histopathologic examination, the mitotic rate was less than 2 mitoses per 2 mm2.  Again, it was well differentiated, grade 1, located in the proximal half of the appendix.  There was no evidence of lymphovascular nor perineural invasion.  Two lymph nodes were taken out in total as described above.  Pathologically, this was described as a T4 N1 lesion.  There is no mention of any perforation seen.  She had subsequent followup including CT abdomen and pelvis and CT chest evaluated at the [facility].      She presented to the [facility] for further followup and met with Dr. Rosalio Campbell from our Colorectal Surgery team on 02/24/2022.  He reviewed her case and she confirms she does not have any carcinoid syndrome-like symptoms prior to or leading up to the diagnosis and surgery.  Specifically, she has not had any heart palpitations or chest pain, facial flushing or profuse or watery diarrhea or frequency of bowel movements.  He reviewed her CT scan and proceeded to discuss the patient's case at our Multidisciplinary Tumor Board 7 days ago.      At that discussion, the recommendation was made to proceed with a right hemicolectomy, particularly due to the finding of involvement of a lymph node.  DOTATATE scan was ordered and scheduled and performed on 03/04/2022.  I personally reviewed the images and reviewed the results today with the patient and her parents in lay terms.  The scan was done on 03/04/2022 and showed no evidence of distant metastatic disease.  There were some benign physiologic activity of uptake in the pancreatic uncinate process but not indicative of any evidence of metastatic disease from the neuroendocrine tumor.  Chromogranin A was checked on 02/18/2022 in the postoperative setting and was 48.  It was  about a week out from the surgery, normal range at that institution being 0-103.  CEA was unremarkable at less than 0.5.  A DOTATATE scan is as noted above.      The current plan is for right hemicolectomy to proceed tentatively on 2022.  In reviewing the medical records, I see it appears the patient and her family have requested a second opinion with Dr. Delgado Merino, another one of our colorectal surgeons here at the [facility] and have also potentially explored exploration of further opinions at the [facility].  She has few questions at this time.  Her understanding is there is an excellent prognosis.  Her parents, understandably, have strong concerns and other questions that I proceeded to answer to the best of my ability today.    PAST MEDICAL AND SURGICAL HISTORY:  Only notable for migraine headaches that her mother states that she has herself and also Na's 3 sisters.  For Na herself, she endorses that it happens and is exacerbated during menstrual cycles.  She has had wisdom teeth removed.  Otherwise, no significant surgeries aside from most obviously the appendectomy 2022 performed by Dr. Bob Aguero at [facility] as noted above, diagnostic of a well-differentiated carcinoid tumor of the appendix.    FAMILY HISTORY:  Family history of malignancy includes her father who was on the call today who had prostate cancer at 55.  He is now 58 and sounds to be doing well.  Paternal grandfather had prostate cancer at age 75 and  at 86 of unrelated causes.  Mother had basal cell carcinoma of the skin.  She has 3 sisters, 2 older, 1 younger.  All are in good health with no evidence of cancer.  There are no other familial germline mutations known.    SOCIAL HISTORY:  Na is staying currently with her parents in [place], [place].  She attends [employer].  She is majoring in business.  She is the class of .  Currently, doing remote online learning while  recovering from her surgery and proceeding with the next steps.    REVIEW OF SYSTEMS:  Full 14-point review of systems was performed.  Pertinent symptoms are reviewed above per HPI.    MEDICATIONS/ALLERGIES:  Fully reviewed in Epic.  Current Outpatient Medications   Medication     buPROPion (WELLBUTRIN XL) 150 MG 24 hr tablet     butalbital-acetaminophen-caffeine (ESGIC) -40 MG tablet     etonogestrel (NEXPLANON) 68 mg Impl implant     metroNIDAZOLE (FLAGYL) 500 MG tablet     neomycin (MYCIFRADIN) 500 MG tablet     ondansetron (ZOFRAN) 4 MG tablet     polyethylene glycol (MIRALAX) 17 g packet     No current facility-administered medications for this visit.        Allergies   Allergen Reactions     Other Environmental Allergy Unknown     Seasonal       PHYSICAL EXAMINATION:  Physical exam could not be performed today in context of a Virtual Visit during the COVID19/Coronavirus pandemic.  Vitals - Patient Reported  Weight (Patient Reported): 54 kg (119 lb)  Pain Score: No Pain (1)         Observed physical assessments made today by visualizing the patient by video link:    General/Constitutional: Generally appears well, not acutely ill.  HEENT: no scleral icterus, not jaundiced.  Respiratory: no labored breathing.  Musculoskeletal: appears to have full range of motion and adequate physical strength.  Skin: no jaundice, discoloration or other notable lesions.  Neurological: no evidence of tremors.  Psychiatric: no evident anxiety; fully alert and oriented with fluent speech.      The rest of a comprehensive physical examination is deferred due to [facility] (public health emergency) video visit restrictions.    Pertinent labs, pathology and imaging were reviewed by me due to the nature of virtual video visit.  The pathology has not yet been received from [facility] and [facility], but we are awaiting review from our pathology team for confirmation of diagnosis and details.    IMPRESSION AND PLAN:  Ms. Monzon  Genet is a 21-year-old young woman with no significant past medical history or any family history of malignancy that I would align with MEN1 syndrome or anything else related to NET.  She has a new diagnosis of a neuroendocrine tumor, specifically well-differentiated carcinoid tumor of the appendix with a low mitotic rate.  It was removed during appendectomy when she initially presented with appendicitis on 02/11/2022.  It was fully resected with negative margins; however, although it was under 2 cm in size, specifically 1.7 cm, there was 1 of 2 lymph nodes involved with malignancy.  For that reason, a right hemicolectomy has been recommended, both for further therapy and for full pathologic staging of the tumor.        I proceeded to review the natural course, biology, diagnosis and treatment of neuroendocrine tumors in general and more specifically well-differentiated form of neuroendocrine tumor.  Her parents had specifically gone on the Internet and were under the impression that systemic chemotherapy, including specifically cytotoxic chemotherapy, would be recommended by me in the postoperative/adjuvant setting.  I stated that, in general, well-differentiated neuroendocrine tumors, especially those resected with intent to cure, would not merit any consideration of cytotoxic chemotherapy unless they were features of poorly differentiated or high-grade features, none of which I see on the outside report.  We will confirm that once our pathology team reviews that.      This explanation seemed to provide some measure of reassurance.  I reviewed the DOTATATE scan and that did not show any evidence of distant metastatic disease.  They did have concern about whether or not there was evidence of potential spread, and I stated that, even if these types of cancer would go to the liver, which they most commonly would do if they were to spread, then surgery may be done with intent to cure or if there are other potential  therapeutic approaches would be done rather than cytotoxic chemotherapy.  They stated full understanding.  I thought that even though this seems to be a nonfunctioning form of carcinoid tumor it would be prudent to get a preoperative transthoracic echocardiogram just to ensure there is no evidence of occult carcinoid hormone disease that would alter or create any vegetations on her cardiac valves.  I have ordered that today.  It will be done after 03/17 at her and her family's request as they wish to take a vacation and be able to be out of town.  They will move forward with the second opinion with Dr. Merino and then potentially the surgery with Dr. Campbell 04/12.  I invited them to certainly go to [facility] if they so wish for further opinion.  If she wishes to follow up with me, I would be happy to implement active surveillance following her surgery and review the outcome from the pathology review from the surgery.  I reviewed all the above in great detail and answered her and in particular her parents' questions to the best of my ability and they stated full understanding of my explanation by the end of the visit.    Thank you very much, Dr. Campbell for the kind referral.      I met with the patient, Na De León, Monday, 03/07/2022, between 10:50 a.m. and 11:35 a.m.      VIRTUAL VISIT - DETAILS:    I have reviewed the note as documented above. This accurately captures the substance of my conversation with the patient.    Date of call: March 7, 2022   Start of call: 10:50 am  End of call: 11:35 am    Provider location: [facility] (academic office)  Patient location: Home      Mode of Video Visit: [facility]           I spent 45 minutes in consultation, including history and discussion with the patient including review of recent lab values and radiologic imaging results.  An additional 30 minutes was spent on the day of the visit, including reviewing pertinent medical notes and documentation  from other physicians and APPs who have evaluated and treated this patient, pertinent lab values, pathology and imaging results, personal review of radiologic images, discussing the case with referring providers and/or nurse coordinator team, post-visit orders, and all post-visit documentation.    Paul Mera MD PhD            D: 2022   T: 2022   MTJesus Alberto garcía    Name:     DELONTE DE LA CRUZ  MRN:      [id number]        Account:      [id number]   :      2000           Service Date: 2022       Document: [id number]

## 2022-03-07 NOTE — PROGRESS NOTES
Na is a 21 year old who is being evaluated via a billable video visit.      How would you like to obtain your AVS? MyChart  If the video visit is dropped, the invitation should be resent by: Send to e-mail at: [email or web address]  Will anyone else be joining your video visit? Sarah MURPHY

## 2022-03-14 ENCOUNTER — TUMOR CONFERENCE (OUTPATIENT)
Dept: ONCOLOGY | Facility: CLINIC | Age: 22
End: 2022-03-14
Payer: COMMERCIAL

## 2022-03-21 ENCOUNTER — HOSPITAL ENCOUNTER (OUTPATIENT)
Dept: CARDIOLOGY | Facility: CLINIC | Age: 22
Discharge: HOME | End: 2022-03-21
Attending: INTERNAL MEDICINE | Admitting: INTERNAL MEDICINE
Payer: COMMERCIAL

## 2022-03-21 DIAGNOSIS — D3A.8 NEUROENDOCRINE NEOPLASM OF APPENDIX (H): ICD-10-CM

## 2022-03-21 LAB — LVEF ECHO: NORMAL

## 2022-03-21 PROCEDURE — 93306 TTE W/DOPPLER COMPLETE: CPT | Mod: 26 | Performed by: INTERNAL MEDICINE

## 2022-03-21 PROCEDURE — 93306 TTE W/DOPPLER COMPLETE: CPT

## 2022-03-29 ENCOUNTER — LAB (OUTPATIENT)
Dept: LAB | Facility: CLINIC | Age: 22
End: 2022-03-29
Payer: COMMERCIAL

## 2022-03-29 DIAGNOSIS — D3A.8 NEUROENDOCRINE NEOPLASM OF APPENDIX (H): Primary | ICD-10-CM

## 2022-03-29 PROCEDURE — 88360 TUMOR IMMUNOHISTOCHEM/MANUAL: CPT | Mod: 26 | Performed by: PATHOLOGY

## 2022-03-29 PROCEDURE — 88321 CONSLTJ&REPRT SLD PREP ELSWR: CPT | Performed by: PATHOLOGY

## 2022-03-29 PROCEDURE — 88360 TUMOR IMMUNOHISTOCHEM/MANUAL: CPT | Mod: TC | Performed by: INTERNAL MEDICINE

## 2022-03-30 ENCOUNTER — PRE VISIT (OUTPATIENT)
Dept: SURGERY | Facility: CLINIC | Age: 22
End: 2022-03-30

## 2022-03-30 ENCOUNTER — ANESTHESIA EVENT (OUTPATIENT)
Dept: SURGERY | Facility: CLINIC | Age: 22
DRG: 827 | End: 2022-03-30
Payer: COMMERCIAL

## 2022-03-30 ENCOUNTER — TELEPHONE (OUTPATIENT)
Dept: GASTROENTEROLOGY | Facility: CLINIC | Age: 22
End: 2022-03-30

## 2022-03-30 ENCOUNTER — VIRTUAL VISIT (OUTPATIENT)
Dept: SURGERY | Facility: CLINIC | Age: 22
End: 2022-03-30
Payer: COMMERCIAL

## 2022-03-30 DIAGNOSIS — D3A.8 NEUROENDOCRINE NEOPLASM OF APPENDIX (H): ICD-10-CM

## 2022-03-30 DIAGNOSIS — Z01.818 PREOP EXAMINATION: Primary | ICD-10-CM

## 2022-03-30 PROCEDURE — 99203 OFFICE O/P NEW LOW 30 MIN: CPT | Mod: 95 | Performed by: PHYSICIAN ASSISTANT

## 2022-03-30 RX ORDER — SCOLOPAMINE TRANSDERMAL SYSTEM 1 MG/1
1 PATCH, EXTENDED RELEASE TRANSDERMAL ONCE
Status: CANCELLED | OUTPATIENT
Start: 2022-03-30 | End: 2022-03-30

## 2022-03-30 ASSESSMENT — PAIN SCALES - GENERAL: PAINLEVEL: NO PAIN (0)

## 2022-03-30 ASSESSMENT — ENCOUNTER SYMPTOMS: SEIZURES: 0

## 2022-03-30 ASSESSMENT — LIFESTYLE VARIABLES: TOBACCO_USE: 0

## 2022-03-30 NOTE — TELEPHONE ENCOUNTER
Received notification from OR Supervisor Coleen that wait list date 4/12/2022 can be accommodated.    Spoke with patient via phone, confirmed the following scheduling, then sent Surgery Packet to patient via MyChart and Mail including related scheduling information and prep instructions:    Your surgery is scheduled:    Date: Tuesday April 12, 2022  ________________________________    Time: 8:00 AM*  ________________________________    Please arrive at:  6:00 AM*  ______________________    Surgeon's Name: Dr. Jacinto Campbell  _______________________  Nurse Line (SCAR Craig): [phone]  Scheduling Line (Do): [phone]      Pre-Op Physical Fax Numbers:         [facility] Pre-Admissions  [facility] Fax:  [phone] / Phone:  [phone]        Your surgery is located at:      [facility]      University campus      [address] [place], [place] [address]rd Floor(3C)      [place] [address]      [phone]      [email or web address]     *Times are tentative and may change. You can expect a call from the pre-admission nurses to confirm arrival and surgery start times if the times should change.     Required: Yes, you will need a  18 years or older to drive you home from your procedure as well as stay with you for 24 hours after your procedure, if you are discharged the same day as your procedure.    Surgery: Robotic possible laparoscopic right hemicolectomy    Upcoming Related Appointments:     Mar 30, 2022  9:30 AM    Pre-op History & Physical  (Arrive by 9:15 AM)  Video Visit with April Hooper PA-C  [facility]  [phone]    VIDEO VISIT         Apr 02, 2022 10:00 AM  Pre-op 4/6/22 COVID PCR Test   Pre-procedure Covid with [facility] COVID TESTING HUB 1  [facility] ([facility] ) [phone]    [facility] [address] [address]     Apr 4, 2022  1:00 PM     2nd  "Opinion  (Arrive by 12:45 PM)  New Cancer Consult with Delgado Merino MD  [facility] [place] ([facility] ) [phone]    [address] [address]   Apr 6, 2022  7:30 AM   Colonoscopy  (Arrive by 6:30 AM)    Colonoscopy with Dr. Jacinto Campbell  [facility] ([facility])   [phone]  [address] [place] [address] [address] [place] [address]    Please follow bowel prep instructions received from Endoscopy Scheduling.     Apr 08, 2022 10:00 AM  Pre-op 4/12/22 COVID PCR Test  Pre-procedure Covid PCR Test with [facility] COVID LAB  [facility] ([facility] )   [phone]    [address] [place] [address]     Apr 08, 2022 10:15 AM  Pre-surgery Labs  LAB with [facility]  [facility] ([facility] ) [phone]    [address] [place] [address]           Apr 27, 2022 11:30 AM  (Arrive by 11:15 AM)  Post-Op with KIARRA Moon CNP  [facility] ([facility] )   [phone]    [address] [address]     May 19, 2022  9:00 AM  (Arrive by 8:45 AM)  Post-Op with Jacinto Campbell MD  [facility] [place] ([facility] ) [phone]    [address] [address]     Pre-surgical Preparation:      MiraLAX/Gatorade, Magnesium Citrate, Antibiotics, Zofran  - see \"Day Before Surgery\"  - obtain medications and ingredients in advance  - if you have diabetes or blood sugar concerns, please use Gatorade ZERO or Low Sugar, as per recommendation by your physician    Do Cornelius  Miriam-op Coordinator  [place]-[facility]al [facility]  Direct Phone: " [phone]

## 2022-03-30 NOTE — PATIENT INSTRUCTIONS
Preparing for Your Surgery      Name:  Na De León   MRN:  [id number]   :  2000   Today's Date:  3/30/2022       Arriving for surgery:  Surgery date:  22  Arrival time: Surgery time is not scheduled at this time. You will be called 1-2 days priior to surgery by Preadmission Nursing, [phone] to confirm arrival time.     Restrictions due to COVID 19       Effective 22 [facility] is implementing the following visitor policy:     1 person may accompany the patient through the Pre-Op process.      That same person may wait in the Surgery Waiting room, provided there is enough room to social distance         Inpatients are allowed 2 visitors per day for the duration of their stay.        Visitors must wear a mask.      Visitors must not be ill.      Visiting hours are 8 am to 8 pm.     parking is available for anyone with mobility limitations or disabilities.  ([employer]  24 hours/ 7 days a week; [place] [employer]  7 am- 3:30 pm, Mon- Fri)    Please come to:      [facility] [address]  [address] [place] [address]    -   Parking is available in the Patient Visitor Ramp on [facility].     -   When entering the hospital you will be asked COVID screening questions, you will then be directed to Registration.  Registration will direct you to the 3rd floor Surgery waiting room.     -   Please ask if you need an escort or a wheelchair to the Surgery Waiting Room.  Preop number- [phone]?     What can I eat or drink?  -  Begin Clear Liquid diet and Bowel Prep on 22 per Colon Rectal Instructions.   -  You may have clear liquids until 2 hours before surgery. (Until 22, time information to come)      OPTIMAL RECOVERY AFTER SURGERY        Begin hydrating yourself by drinking at least 8-10 glasses of clear liquids for 24 hours before surgery:      Suggested clear liquids:   Water    Clear Juices   Clear  Broth   Non- carbonated beverages    (Crystal Light or Randy Aid)   Sodas    (Sprite, 7 up, ginger ale, seltzer)   Gatorade              Drink clear liquids up until 2 hours before your surgery.       We would like you to purchase a drink such as Gatorade or Ensure Clear (not the milkshake type).  Drink this before bedtime and on the way into the hospital, drink between 8-10 ounces or until you feel hydrated.        Keeping well hydrated leads to your veins being plump, you wake up faster, and you are less likely to be nauseated. Start drinking water as soon as you can after surgery and advance to clear liquids and food as tolerated.  IV fluids contain salt, drinking fluids will minimize the amount of IV fluids you need and decrease the amount of salt you get.                 The most common reason for the patient to be readmitted is dehydration. Staying hydrated after you go home from the hospital is very important.  Ensure or Ensure Clear are good options to keep you hydrated.      -  No Alcohol for at least 24 hours before surgery     Which medicines can I take?    Hold Aspirin for 7 days before surgery.   Hold Multivitamins for 7 days before surgery.  Hold Supplements for 7 days before surgery.  Hold Ibuprofen (Advil, Motrin) for 1 day before surgery--unless otherwise directed by surgeon.  Hold Naproxen (Aleve) for 4 days before surgery.    -  DO NOT take these medications the day of surgery:    Butalbitol-Acetaminophen-Caffeine(ESGIC)    -  PLEASE TAKE these medications the day of surgery:    Bupropion(Wellbutrin)    How do I prepare myself?  - Please take 2 showers before surgery using Scrubcare or Hibiclens soap.    Use this soap only from the neck to your toes.     Leave the soap on your skin for one minute--then rinse thoroughly.      You may use your own shampoo and conditioner; no other hair products.   - Please remove all jewelry and body piercings.  - No lotions, deodorants or fragrance.  - No makeup or  fingernail polish.   - Bring your ID and insurance card.    -If you have a Deep Brain Stimulator, Spinal Cord Stimulator or any neuro stimulator device---you must bring the remote control to the hospital     - All patients are required to have a Covid-19 test within 4 days of surgery/procedure.      -Patients will be contacted by the [facility] scheduling team within 1 week of surgery to make an appointment.      - Patients may call the Scheduling team at [phone] if they have not been scheduled within 4 days of  surgery.          Questions or Concerns:    - For any questions regarding the day of surgery or your hospital stay, please contact the Pre Admission Nursing Office at [phone].       - If you have health changes between today and your surgery please call your surgeon.       For questions after surgery please call your surgeons office.

## 2022-03-30 NOTE — H&P
Pre-Operative H & P     CC:  Preoperative exam to assess for increased cardiopulmonary risk while undergoing surgery and anesthesia.    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 She is status post appendectomy at [facility] 02/11/2022 with negative margins and a low mitotic index. She now presents for the above procedure.    History was obtained from patient & chart review. She is accompanied by her mother.       Hx of abnormal bleeding or anti-platelet use: denies    Menstrual history: Patient's last menstrual period was 03/29/2022.:       Past Medical History  Past Medical History:   Diagnosis Date     Anxiety      Concussion      Migraine      Neuroendocrine neoplasm of appendix 02/2022       Past Surgical History  Past Surgical History:   Procedure Laterality Date     APPENDECTOMY  02/12/2022     DENTAL SURGERY      wisdom teeth       Prior to Admission Medications  Current Outpatient Medications   Medication Sig Dispense Refill     buPROPion (WELLBUTRIN XL) 150 MG 24 hr tablet [BUPROPION (WELLBUTRIN XL) 150 MG 24 HR TABLET] Take 1 tablet (150 mg total) by mouth daily. (Patient taking differently: Take 150 mg by mouth every morning ) 90 tablet 3      "butalbital-acetaminophen-caffeine (ESGIC) -40 MG tablet Take 1 tablet by mouth every 4 hours as needed for headaches 60 tablet 1     etonogestrel (NEXPLANON) 68 mg Impl implant 1 each by Subdermal route once        metroNIDAZOLE (FLAGYL) 500 MG tablet Take 1 tablet (500 mg) by mouth every 6 hours At 8:00 am, 2:00 pm, 8:00 pm the day prior to your surgery with neomycin and zofran. 3 tablet 0     neomycin (MYCIFRADIN) 500 MG tablet Take 2 tablets (1,000 mg) by mouth every 6 hours At 8:00 am, 2:00 pm, 8:00 pm the day prior to your surgery with flagyl and zofran. (Patient not taking: Reported on 3/7/2022) 6 tablet 0     ondansetron (ZOFRAN) 4 MG tablet Take 1 tablet (4 mg) by mouth every 6 hours At 8:00 am, 2:00 pm, 8:00 pm the day prior to your surgery with neomycin and flagyl. 3 tablet 0     polyethylene glycol (MIRALAX) 17 g packet Take 238 g by mouth See Admin Instructions Starting at 4 pm night prior to surgery. Refer to \"Getting Ready for Surgery\" instructions. 14 packet 0       Allergies  Allergies   Allergen Reactions     Other Environmental Allergy Unknown     Seasonal       Social History  Social History     Socioeconomic History     Marital status: Single     Spouse name: Not on file     Number of children: Not on file     Years of education: Not on file     Highest education level: Not on file   Occupational History     Not on file   Tobacco Use     Smoking status: Never Smoker     Smokeless tobacco: Never Used   Substance and Sexual Activity     Alcohol use: Yes     Drug use: No     Sexual activity: Yes     Partners: Male     Birth control/protection: Implant   Other Topics Concern     Not on file   Social History Narrative    Lives at home with mother, father and 3 sisters.     Social Determinants of Health     Financial Resource Strain: Not on file   Food Insecurity: Not on file   Transportation Needs: Not on file   Physical Activity: Not on file   Stress: Not on file   Social Connections: Not on " file   Intimate Partner Violence: Not on file   Housing Stability: Not on file       Family History  Family History   Problem Relation Age of Onset     Basal cell carcinoma Mother      Hyperlipidemia Father      Anxiety Disorder Sister      Anxiety Disorder Sister      No Known Problems Sister      Chronic Obstructive Pulmonary Disease Maternal Grandmother      Rheumatoid Arthritis Maternal Grandmother      Hypertension Maternal Grandmother      Depression Maternal Grandfather      Alzheimer Disease Maternal Grandfather      Prostate Cancer Maternal Grandfather      Pacemaker Paternal Grandmother      Deep Vein Thrombosis (DVT) No family hx of        Review of Systems  The complete review of systems is negative other than noted in the HPI or here.     Anesthesia Evaluation   Pt has had prior anesthetic.     History of anesthetic complications  - PONV.      ROS/MED HX  ENT/Pulmonary:  - neg pulmonary ROS  (-) tobacco use, asthma and sleep apnea   Neurologic: Comment: Takes butalbital-acetaminophen-caffeine prn for migraines      (+) migraines,  (-) no seizures and no CVA   Cardiovascular:     (+) -----Previous cardiac testing   Echo: Date: 3/21/22 Results:  The left ventricle is normal in size with normal left ventricular wall  thickness.  Left ventricular function is normal.The ejection fraction is 60-65%.  Normal right ventricle size and systolic function.  No valve disease is identified.  There is no comparison study available.    Stress Test: Date: Results:    ECG Reviewed: Date: Results:    Cath: Date: Results:      METS/Exercise Tolerance: >4 METS    Hematologic:  - neg hematologic  ROS  (-) history of blood clots and history of blood transfusion   Musculoskeletal:  - neg musculoskeletal ROS     GI/Hepatic: Comment: Neuroendocrine tumor of the appendix. S/P appendectomy at [facility] 02/11/2022 .     (-) GERD and liver disease   Renal/Genitourinary:  - neg Renal ROS  (-) renal disease   Endo:  -  neg endo ROS  (-) Type II DM   Psychiatric/Substance Use:     (+) psychiatric history anxiety     Infectious Disease:  - neg infectious disease ROS     Malignancy:   (+) Malignancy, History of GI.GI CA Active status post.        Other:  - neg other ROS          Virtual visit -  No vitals were obtained    Physical Exam  Constitutional: Awake, alert, cooperative, no apparent distress, and appears stated age.  Eyes: Pupils equal  HENT: Normocephalic  Respiratory: non labored breathing   Neurologic: Awake, alert, oriented to name, place and time.   Neuropsychiatric: Calm, cooperative. Normal affect.      Prior Labs/Diagnostic Studies   All labs and imaging personally reviewed     Echo result w/o MOPS 3/21/22: Interpretation Summary The left ventricle is normal in size with normal left ventricular wallthickness.Left ventricular function is normal.The ejection fraction is 60-65%.Normal right ventricle size and systolic function.No valve disease is identified.There is no comparison study available.    LABS 2/18/22  WBC Count 4.0 - 11.0 10e3/uL 5.2       RBC Count 3.80 - 5.20 10e6/uL 3.80      Hemoglobin 11.7 - 15.7 g/dL 11.2 Low   13.7 R     Hematocrit 35.0 - 47.0 % 34.5 Low       MCV 78 - 100 fL 91      MCH 26.5 - 33.0 pg 29.5      MCHC 31.5 - 36.5 g/dL 32.5      RDW 10.0 - 15.0 % 12.1      Platelet Count 150 - 450 10e3/uL 308      % Neutrophils % 52      % Lymphocytes % 35      % Monocytes % 10      % Eosinophils % 2      % Basophils % 1      % Immature Granulocytes % 0      NRBCs per 100 WBC <1 /100 0      Absolute Neutrophils 1.6 - 8.3 10e3/uL 2.8      Absolute Lymphocytes 0.8 - 5.3 10e3/uL 1.8      Absolute Monocytes 0.0 - 1.3 10e3/uL 0.5      Absolute Eosinophils 0.0 - 0.7 10e3/uL 0.1      Absolute Basophils 0.0 - 0.2 10e3/uL 0.0      Absolute Immature Granulocytes <=0.4 10e3/uL 0.0      Absolute NRBCs 10e3/uL 0.0         Sodium 133 - 144 mmol/L 136  141 R      Potassium 3.4 - 5.3 mmol/L 4.3  4.4 R     Chloride 94 -  109 mmol/L 108  107 R     Carbon Dioxide (CO2) 20 - 32 mmol/L 25  23 R     Anion Gap 3 - 14 mmol/L 3  11 R     Urea Nitrogen 7 - 30 mg/dL 11  10 R     Creatinine 0.52 - 1.04 mg/dL 0.85  0.89 R     Calcium 8.5 - 10.1 mg/dL 8.8  9.5 R     Glucose 70 - 99 mg/dL 69 Low   90 R     Alkaline Phosphatase 40 - 150 U/L 34 Low   46 R     AST 0 - 45 U/L 20  25 R     ALT 0 - 50 U/L 32  23 R     Protein Total 6.8 - 8.8 g/dL 6.8  6.6 R     Albumin 3.4 - 5.0 g/dL 3.6  4.1 R     Bilirubin Total 0.2 - 1.3 mg/dL 0.2  0.5 R     GFR Estimate >60 mL/min/1.73m2 >90  >60          The patient's records and results personally reviewed by this provider.     Labs to be collected on 4/8/22:  CMP, CBC, prealbumin, T&S, COVID    Assessment      Na De León is a 21 year old female seen as a PAC referral for risk assessment and optimization for anesthesia.    Plan/Recommendations  Pt will be optimized for the proposed procedure.  See below for details on the assessment, risk, and preoperative recommendations    NEUROLOGY  - No history of TIA, CVA or seizure    ENT  - No current airway concerns.  Will need to be reassessed day of surgery.  Mallampati: Unable to assess  TM: > 3    CARDIAC  - No history of CAD, Hypertension and Afib  - METS (Metabolic Equivalents)  Patient performs 4 or more METS exercise without symptoms            Total Score: 0      RCRI-Low risk: Class 2 0.9% complication rate            Total Score: 1    RCRI: High Risk Surgery        PULMONARY  RICARDO Low Risk            Total Score: 0      - Denies asthma or inhaler use  - Tobacco History      History   Smoking Status     Never Smoker   Smokeless Tobacco     Never Used       GI  - Denies GERD   - Neuroendocrine tumor of the appendix. S/P appendectomy at [facility] 02/11/2022 .    PONV High Risk  Total Score: 4           1 AN PONV: Pt is Female    1 AN PONV: Patient is not a current smoker    1 AN PONV: Patient has history of PONV    1 AN PONV: Intended Post Op  "Opioids          ENDOCRINE    - BMI: Estimated body mass index is 21.33 kg/m  as calculated from the following:    Height as of 2/24/22: 1.6 m (5' 3\").    Weight as of 3/2/22: 54.6 kg (120 lb 6.4 oz).  Healthy Weight (BMI 18.5-24.9)  - No history of Diabetes Mellitus    HEME  VTE Medium Risk 1.8%            Total Score: 5    VTE: Current cancer      - No history of abnormal bleeding or antiplatelet use.      MSK  Patient is NOT Frail            Total Score: 0          The patient is optimized for their procedure. AVS with information on surgery time/arrival time, meds and NPO status given by nursing staff. No further diagnostic testing indicated.    Please refer to the physical examination documented by the anesthesiologist in the anesthesia record on the day of surgery.    Final plan per anesthesiologist on day of surgery.    Video-Visit Details    Type of service:  Video Visit    Patient verbally consented to video service today: YES    Video Start Time: 0918  Video End Time (time video stopped): 0924    Originating Location (pt. Location): Home    Distant Location (provider location):  [facility] PREOPERATIVE ASSESSMENT CENTER     Mode of Communication:  Video Conference via [email or web address]  On the day of service:     Prep time: 12 minutes  Visit time: 6 minutes  Documentation time: 10 minutes  ------------------------------------------  Total time: 28 minutes      April Hooper PA-C  Preoperative Assessment Center  [facility]  Clinic and Surgery Center  Phone: [phone]  Fax: [phone]  "

## 2022-03-30 NOTE — TELEPHONE ENCOUNTER
Attempted to contact patient regarding upcoming colonoscopy procedure on 4.6.2022 for pre assessment questions. No answer.     Left message to return call to [phone] #2    Covid test scheduled: 4.2.2022    Arrival time: 0630    Facility location: [facility]    Sedation type: MAC    Indication for procedure:   Neuroendocrine neoplasm of appendix   Encounter for screening colonoscopy     Referring provider: Jacinto Campbell MD     Bowel prep recommendation: Miralax/Magnesium citrate/Dulcolax    Teena Flores RN

## 2022-03-30 NOTE — PROGRESS NOTES
Na is a 21 year old who is being evaluated via a billable video visit.      How would you like to obtain your AVS? MyChart    HPI       Review of Systems         Objective    Vitals - Patient Reported  Pain Score: No Pain (0)        Physical Exam   NARESH Jeff LPN

## 2022-03-30 NOTE — H&P (VIEW-ONLY)
Pre-Operative H & P     CC:  Preoperative exam to assess for increased cardiopulmonary risk while undergoing surgery and anesthesia.    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 She is status post appendectomy at [facility] 02/11/2022 with negative margins and a low mitotic index. She now presents for the above procedure.    History was obtained from patient & chart review. She is accompanied by her mother.       Hx of abnormal bleeding or anti-platelet use: denies    Menstrual history: Patient's last menstrual period was 03/29/2022.:       Past Medical History  Past Medical History:   Diagnosis Date     Anxiety      Concussion      Migraine      Neuroendocrine neoplasm of appendix 02/2022       Past Surgical History  Past Surgical History:   Procedure Laterality Date     APPENDECTOMY  02/12/2022     DENTAL SURGERY      wisdom teeth       Prior to Admission Medications  Current Outpatient Medications   Medication Sig Dispense Refill     buPROPion (WELLBUTRIN XL) 150 MG 24 hr tablet [BUPROPION (WELLBUTRIN XL) 150 MG 24 HR TABLET] Take 1 tablet (150 mg total) by mouth daily. (Patient taking differently: Take 150 mg by mouth every morning ) 90 tablet 3      "butalbital-acetaminophen-caffeine (ESGIC) -40 MG tablet Take 1 tablet by mouth every 4 hours as needed for headaches 60 tablet 1     etonogestrel (NEXPLANON) 68 mg Impl implant 1 each by Subdermal route once        metroNIDAZOLE (FLAGYL) 500 MG tablet Take 1 tablet (500 mg) by mouth every 6 hours At 8:00 am, 2:00 pm, 8:00 pm the day prior to your surgery with neomycin and zofran. 3 tablet 0     neomycin (MYCIFRADIN) 500 MG tablet Take 2 tablets (1,000 mg) by mouth every 6 hours At 8:00 am, 2:00 pm, 8:00 pm the day prior to your surgery with flagyl and zofran. (Patient not taking: Reported on 3/7/2022) 6 tablet 0     ondansetron (ZOFRAN) 4 MG tablet Take 1 tablet (4 mg) by mouth every 6 hours At 8:00 am, 2:00 pm, 8:00 pm the day prior to your surgery with neomycin and flagyl. 3 tablet 0     polyethylene glycol (MIRALAX) 17 g packet Take 238 g by mouth See Admin Instructions Starting at 4 pm night prior to surgery. Refer to \"Getting Ready for Surgery\" instructions. 14 packet 0       Allergies  Allergies   Allergen Reactions     Other Environmental Allergy Unknown     Seasonal       Social History  Social History     Socioeconomic History     Marital status: Single     Spouse name: Not on file     Number of children: Not on file     Years of education: Not on file     Highest education level: Not on file   Occupational History     Not on file   Tobacco Use     Smoking status: Never Smoker     Smokeless tobacco: Never Used   Substance and Sexual Activity     Alcohol use: Yes     Drug use: No     Sexual activity: Yes     Partners: Male     Birth control/protection: Implant   Other Topics Concern     Not on file   Social History Narrative    Lives at home with mother, father and 3 sisters.     Social Determinants of Health     Financial Resource Strain: Not on file   Food Insecurity: Not on file   Transportation Needs: Not on file   Physical Activity: Not on file   Stress: Not on file   Social Connections: Not on " file   Intimate Partner Violence: Not on file   Housing Stability: Not on file       Family History  Family History   Problem Relation Age of Onset     Basal cell carcinoma Mother      Hyperlipidemia Father      Anxiety Disorder Sister      Anxiety Disorder Sister      No Known Problems Sister      Chronic Obstructive Pulmonary Disease Maternal Grandmother      Rheumatoid Arthritis Maternal Grandmother      Hypertension Maternal Grandmother      Depression Maternal Grandfather      Alzheimer Disease Maternal Grandfather      Prostate Cancer Maternal Grandfather      Pacemaker Paternal Grandmother      Deep Vein Thrombosis (DVT) No family hx of        Review of Systems  The complete review of systems is negative other than noted in the HPI or here.     Anesthesia Evaluation   Pt has had prior anesthetic.     History of anesthetic complications  - PONV.      ROS/MED HX  ENT/Pulmonary:  - neg pulmonary ROS  (-) tobacco use, asthma and sleep apnea   Neurologic: Comment: Takes butalbital-acetaminophen-caffeine prn for migraines      (+) migraines,  (-) no seizures and no CVA   Cardiovascular:     (+) -----Previous cardiac testing   Echo: Date: 3/21/22 Results:  The left ventricle is normal in size with normal left ventricular wall  thickness.  Left ventricular function is normal.The ejection fraction is 60-65%.  Normal right ventricle size and systolic function.  No valve disease is identified.  There is no comparison study available.    Stress Test: Date: Results:    ECG Reviewed: Date: Results:    Cath: Date: Results:      METS/Exercise Tolerance: >4 METS    Hematologic:  - neg hematologic  ROS  (-) history of blood clots and history of blood transfusion   Musculoskeletal:  - neg musculoskeletal ROS     GI/Hepatic: Comment: Neuroendocrine tumor of the appendix. S/P appendectomy at [facility] 02/11/2022 .     (-) GERD and liver disease   Renal/Genitourinary:  - neg Renal ROS  (-) renal disease   Endo:  -  neg endo ROS  (-) Type II DM   Psychiatric/Substance Use:     (+) psychiatric history anxiety     Infectious Disease:  - neg infectious disease ROS     Malignancy:   (+) Malignancy, History of GI.GI CA Active status post.        Other:  - neg other ROS          Virtual visit -  No vitals were obtained    Physical Exam  Constitutional: Awake, alert, cooperative, no apparent distress, and appears stated age.  Eyes: Pupils equal  HENT: Normocephalic  Respiratory: non labored breathing   Neurologic: Awake, alert, oriented to name, place and time.   Neuropsychiatric: Calm, cooperative. Normal affect.      Prior Labs/Diagnostic Studies   All labs and imaging personally reviewed     Echo result w/o MOPS 3/21/22: Interpretation Summary The left ventricle is normal in size with normal left ventricular wallthickness.Left ventricular function is normal.The ejection fraction is 60-65%.Normal right ventricle size and systolic function.No valve disease is identified.There is no comparison study available.    LABS 2/18/22  WBC Count 4.0 - 11.0 10e3/uL 5.2       RBC Count 3.80 - 5.20 10e6/uL 3.80      Hemoglobin 11.7 - 15.7 g/dL 11.2 Low   13.7 R     Hematocrit 35.0 - 47.0 % 34.5 Low       MCV 78 - 100 fL 91      MCH 26.5 - 33.0 pg 29.5      MCHC 31.5 - 36.5 g/dL 32.5      RDW 10.0 - 15.0 % 12.1      Platelet Count 150 - 450 10e3/uL 308      % Neutrophils % 52      % Lymphocytes % 35      % Monocytes % 10      % Eosinophils % 2      % Basophils % 1      % Immature Granulocytes % 0      NRBCs per 100 WBC <1 /100 0      Absolute Neutrophils 1.6 - 8.3 10e3/uL 2.8      Absolute Lymphocytes 0.8 - 5.3 10e3/uL 1.8      Absolute Monocytes 0.0 - 1.3 10e3/uL 0.5      Absolute Eosinophils 0.0 - 0.7 10e3/uL 0.1      Absolute Basophils 0.0 - 0.2 10e3/uL 0.0      Absolute Immature Granulocytes <=0.4 10e3/uL 0.0      Absolute NRBCs 10e3/uL 0.0         Sodium 133 - 144 mmol/L 136  141 R      Potassium 3.4 - 5.3 mmol/L 4.3  4.4 R     Chloride 94 -  109 mmol/L 108  107 R     Carbon Dioxide (CO2) 20 - 32 mmol/L 25  23 R     Anion Gap 3 - 14 mmol/L 3  11 R     Urea Nitrogen 7 - 30 mg/dL 11  10 R     Creatinine 0.52 - 1.04 mg/dL 0.85  0.89 R     Calcium 8.5 - 10.1 mg/dL 8.8  9.5 R     Glucose 70 - 99 mg/dL 69 Low   90 R     Alkaline Phosphatase 40 - 150 U/L 34 Low   46 R     AST 0 - 45 U/L 20  25 R     ALT 0 - 50 U/L 32  23 R     Protein Total 6.8 - 8.8 g/dL 6.8  6.6 R     Albumin 3.4 - 5.0 g/dL 3.6  4.1 R     Bilirubin Total 0.2 - 1.3 mg/dL 0.2  0.5 R     GFR Estimate >60 mL/min/1.73m2 >90  >60          The patient's records and results personally reviewed by this provider.     Labs to be collected on 4/8/22:  CMP, CBC, prealbumin, T&S, COVID    Assessment      Na De León is a 21 year old female seen as a PAC referral for risk assessment and optimization for anesthesia.    Plan/Recommendations  Pt will be optimized for the proposed procedure.  See below for details on the assessment, risk, and preoperative recommendations    NEUROLOGY  - No history of TIA, CVA or seizure    ENT  - No current airway concerns.  Will need to be reassessed day of surgery.  Mallampati: Unable to assess  TM: > 3    CARDIAC  - No history of CAD, Hypertension and Afib  - METS (Metabolic Equivalents)  Patient performs 4 or more METS exercise without symptoms            Total Score: 0      RCRI-Low risk: Class 2 0.9% complication rate            Total Score: 1    RCRI: High Risk Surgery        PULMONARY  RICARDO Low Risk            Total Score: 0      - Denies asthma or inhaler use  - Tobacco History      History   Smoking Status     Never Smoker   Smokeless Tobacco     Never Used       GI  - Denies GERD   - Neuroendocrine tumor of the appendix. S/P appendectomy at [facility] 02/11/2022 .    PONV High Risk  Total Score: 4           1 AN PONV: Pt is Female    1 AN PONV: Patient is not a current smoker    1 AN PONV: Patient has history of PONV    1 AN PONV: Intended Post Op  "Opioids          ENDOCRINE    - BMI: Estimated body mass index is 21.33 kg/m  as calculated from the following:    Height as of 2/24/22: 1.6 m (5' 3\").    Weight as of 3/2/22: 54.6 kg (120 lb 6.4 oz).  Healthy Weight (BMI 18.5-24.9)  - No history of Diabetes Mellitus    HEME  VTE Medium Risk 1.8%            Total Score: 5    VTE: Current cancer      - No history of abnormal bleeding or antiplatelet use.      MSK  Patient is NOT Frail            Total Score: 0          The patient is optimized for their procedure. AVS with information on surgery time/arrival time, meds and NPO status given by nursing staff. No further diagnostic testing indicated.    Please refer to the physical examination documented by the anesthesiologist in the anesthesia record on the day of surgery.    Final plan per anesthesiologist on day of surgery.    Video-Visit Details    Type of service:  Video Visit    Patient verbally consented to video service today: YES    Video Start Time: 0918  Video End Time (time video stopped): 0924    Originating Location (pt. Location): Home    Distant Location (provider location):  [facility] PREOPERATIVE ASSESSMENT CENTER     Mode of Communication:  Video Conference via Mirada  On the day of service:     Prep time: 12 minutes  Visit time: 6 minutes  Documentation time: 10 minutes  ------------------------------------------  Total time: 28 minutes      April Hooper PA-C  Preoperative Assessment Center  [facility]  Clinic and Surgery Center  Phone: [phone]  Fax: [phone]  "

## 2022-03-31 NOTE — TELEPHONE ENCOUNTER
Returning pt's call.    Pre assessment questions completed for upcoming colonoscopy procedure scheduled on 4.6.2022    COVID test scheduled 4.2.2022    Reviewed procedural arrival time 0630 and facility location [facility].    Designated  policy reviewed. Instructed to have someone stay 24 hours post procedure.     Anticoagulation/blood thinners? no    Electronic implanted devices? no    Reviewed Miralax/Magnesium citrate/Dulcolax prep instructions with patient. No fiber/iron supplements or foods that contain nuts/seeds prior to procedure.     Patient verbalized understanding and had no questions or concerns at this time.    Teena Flores RN

## 2022-04-01 ENCOUNTER — TEAM CONFERENCE (OUTPATIENT)
Dept: SURGERY | Facility: CLINIC | Age: 22
End: 2022-04-01

## 2022-04-01 LAB
PATH REPORT.COMMENTS IMP SPEC: NORMAL
PATH REPORT.FINAL DX SPEC: NORMAL
PATH REPORT.GROSS SPEC: NORMAL
PATH REPORT.MICROSCOPIC SPEC OTHER STN: NORMAL
PATH REPORT.RELEVANT HX SPEC: NORMAL
PATH REPORT.RELEVANT HX SPEC: NORMAL
PATH REPORT.SITE OF ORIGIN SPEC: NORMAL

## 2022-04-01 NOTE — PROGRESS NOTES
COLON AND RECTAL SURGERY HUDDLE:    Patient was reviewed in preporation for their surgery the following was reviewed and has been completed:    Surgeon: Dr. Jacinto Campbell     Surgery & Date: right hemicolectomy 4/11/22     Last MD Note: reviewed    Anesthesia Type: General    Other Providers: No    PAC: Yes    WOC: No    Labs: Yes 4/8    Bowel Prep: Yes MiraLAX / Gatorade , Antibiotic and Magnesium Citrate    Packet: Yes    Imaging: Yes- colonoscopy on 4/6    Post-Op Appointments: Yes    COVID: Yes 4/8    Is patient on TPN?: No   If yes, I contacted the TPN pharmacist by paging the  pharmacy at [phone] or calling [phone]. I also contacted Ana PAZ with inpatient colon and rectal team.

## 2022-04-02 ENCOUNTER — LAB (OUTPATIENT)
Dept: FAMILY MEDICINE | Facility: CLINIC | Age: 22
End: 2022-04-02
Payer: COMMERCIAL

## 2022-04-02 DIAGNOSIS — Z11.59 ENCOUNTER FOR SCREENING FOR OTHER VIRAL DISEASES: ICD-10-CM

## 2022-04-02 PROCEDURE — U0005 INFEC AGEN DETEC AMPLI PROBE: HCPCS

## 2022-04-02 PROCEDURE — U0003 INFECTIOUS AGENT DETECTION BY NUCLEIC ACID (DNA OR RNA); SEVERE ACUTE RESPIRATORY SYNDROME CORONAVIRUS 2 (SARS-COV-2) (CORONAVIRUS DISEASE [COVID-19]), AMPLIFIED PROBE TECHNIQUE, MAKING USE OF HIGH THROUGHPUT TECHNOLOGIES AS DESCRIBED BY CMS-2020-01-R: HCPCS

## 2022-04-03 LAB — SARS-COV-2 RNA RESP QL NAA+PROBE: NEGATIVE

## 2022-04-04 ENCOUNTER — PRE VISIT (OUTPATIENT)
Dept: SURGERY | Facility: CLINIC | Age: 22
End: 2022-04-04
Payer: COMMERCIAL

## 2022-04-06 ENCOUNTER — TRANSFERRED RECORDS (OUTPATIENT)
Dept: HEALTH INFORMATION MANAGEMENT | Facility: CLINIC | Age: 22
End: 2022-04-06
Payer: COMMERCIAL

## 2022-04-06 ENCOUNTER — DOCUMENTATION ONLY (OUTPATIENT)
Dept: GASTROENTEROLOGY | Facility: OUTPATIENT CENTER | Age: 22
End: 2022-04-06
Payer: COMMERCIAL

## 2022-04-06 DIAGNOSIS — D3A.8 NEUROENDOCRINE NEOPLASM OF APPENDIX (H): ICD-10-CM

## 2022-04-06 DIAGNOSIS — Z12.11 ENCOUNTER FOR SCREENING COLONOSCOPY: ICD-10-CM

## 2022-04-07 ENCOUNTER — OFFICE VISIT (OUTPATIENT)
Dept: FAMILY MEDICINE | Facility: CLINIC | Age: 22
End: 2022-04-07
Payer: COMMERCIAL

## 2022-04-07 VITALS
DIASTOLIC BLOOD PRESSURE: 60 MMHG | HEART RATE: 84 BPM | WEIGHT: 117.3 LBS | BODY MASS INDEX: 20.78 KG/M2 | SYSTOLIC BLOOD PRESSURE: 88 MMHG | OXYGEN SATURATION: 99 %

## 2022-04-07 DIAGNOSIS — Z01.818 PRE-OP EXAM: Primary | ICD-10-CM

## 2022-04-07 DIAGNOSIS — D3A.8 NEUROENDOCRINE NEOPLASM OF APPENDIX (H): ICD-10-CM

## 2022-04-07 DIAGNOSIS — F41.9 ANXIETY: ICD-10-CM

## 2022-04-07 DIAGNOSIS — G43.009 MIGRAINE WITHOUT AURA AND WITHOUT STATUS MIGRAINOSUS, NOT INTRACTABLE: ICD-10-CM

## 2022-04-07 PROCEDURE — 99214 OFFICE O/P EST MOD 30 MIN: CPT | Performed by: FAMILY MEDICINE

## 2022-04-07 RX ORDER — SULFACETAMIDE SODIUM, SULFUR 100; 50 MG/G; MG/G
EMULSION TOPICAL
COMMUNITY
Start: 2022-03-03 | End: 2022-12-12

## 2022-04-07 RX ORDER — SPIRONOLACTONE 50 MG/1
TABLET, FILM COATED ORAL
COMMUNITY
Start: 2022-03-03 | End: 2022-12-12

## 2022-04-07 RX ORDER — TRAMADOL HYDROCHLORIDE 50 MG/1
TABLET ORAL
Status: ON HOLD | COMMUNITY
Start: 2022-02-12 | End: 2022-04-15

## 2022-04-07 RX ORDER — CEFUROXIME AXETIL 500 MG/1
TABLET ORAL
Status: ON HOLD | COMMUNITY
Start: 2022-03-03 | End: 2022-04-15

## 2022-04-07 RX ORDER — TRETINOIN 0.25 MG/G
CREAM TOPICAL
COMMUNITY
Start: 2022-03-03 | End: 2022-12-12

## 2022-04-07 RX ORDER — SULFACETAMIDE SODIUM, SULFUR 100; 50 MG/G; MG/G
SUSPENSION TOPICAL
COMMUNITY
Start: 2022-03-03 | End: 2022-12-12

## 2022-04-07 RX ORDER — CEPHALEXIN 500 MG/1
CAPSULE ORAL
Status: ON HOLD | COMMUNITY
Start: 2022-02-11 | End: 2022-04-15

## 2022-04-07 NOTE — PROGRESS NOTES
[facility]  [address]  [facility] [address]  Phone: [phone]  Fax: [phone]  Primary Provider: Emma Borrero        PREOPERATIVE EVALUATION:  Today's date: 4/7/2022    Na De León is a 21 year old female who presents for a preoperative evaluation.    Surgical Information:  Surgery/Procedure: right hemicolectomy  Surgery Location: [facility]  Surgeon: Dr. Conklin  Surgery Date: 4/12/22  Time of Surgery: tbd  Where patient plans to recover: At home with family  Fax number for surgical facility: Note does not need to be faxed, will be available electronically in Epic.    Type of Anesthesia Anticipated: to be determined    Assessment & Plan     The proposed surgical procedure is considered INTERMEDIATE risk.    Pre-op exam  No contraindications to planned procedure.  Current medical conditions are stable.  She has appointment for labs and Covid testing tomorrow.  She has been advised to avoid aspirin and NSAIDs prior to surgery    Neuroendocrine neoplasm of appendix  Okay to proceed with planned procedure    Migraine without aura and without status migrainosus, not intractable  Stable.  No change in regimen prior to surgery    Anxiety  Stable.  Continue current medication.  No change in regimen prior to surgery                   RECOMMENDATION:  APPROVAL GIVEN to proceed with proposed procedure, without further diagnostic evaluation.                      Subjective     HPI related to upcoming procedure: She recently underwent a laparoscopic appendectomy on 2/12/2022 at [facility] in [place].  Surgery was uneventful.  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  She is now scheduled for a right hemicolectomy.  This will be performed on 4/12/2022.    In review of her chart, it appears that she did have a preoperative examination on 3/30/2022 in  preparation for the right hemicolectomy.  Discussed with patient that I am not sure that today's visit is necessary but she wishes to proceed with preoperative examination today as she was advised by her mother that this appointment is needed.    We reviewed her health history including medications and allergies.  She has underlying anxiety and is on bupropion.  She has history of migraine headaches and uses butalbital-acetaminophen-caffeine as needed.  Current medical conditions are stable.  She was recently prescribed spironolactone by her dermatologist but does not plan to start this medication until after her surgeries.  She reports that she is feeling well and denies signs or symptoms of illness.  Review of systems is assessed and is negative.      Preop Questions 4/7/2022   1. Have you ever had a heart attack or stroke? No   2. Have you ever had surgery on your heart or blood vessels, such as a stent placement, a coronary artery bypass, or surgery on an artery in your head, neck, heart, or legs? No   3. Do you have chest pain with activity? No   4. Do you have a history of  heart failure? No   5. Do you currently have a cold, bronchitis or symptoms of other infection? No   6. Do you have a cough, shortness of breath, or wheezing? No   7. Do you or anyone in your family have previous history of blood clots? No   8. Do you or does anyone in your family have a serious bleeding problem such as prolonged bleeding following surgeries or cuts? No   9. Have you ever had problems with anemia or been told to take iron pills? No   10. Have you had any abnormal blood loss such as black, tarry or bloody stools, or abnormal vaginal bleeding? No   11. Have you ever had a blood transfusion? No   12. Are you willing to have a blood transfusion if it is medically needed before, during, or after your surgery? Yes   13. Have you or any of your relatives ever had problems with anesthesia? No   14. Do you have sleep apnea,  excessive snoring or daytime drowsiness? No   15. Do you have any artifical heart valves or other implanted medical devices like a pacemaker, defibrillator, or continuous glucose monitor? No   16. Do you have artificial joints? No   17. Are you allergic to latex? No   18. Is there any chance that you may be pregnant? No       Health Care Directive:  Patient does not have a Health Care Directive or Living Will: Discussed advance care planning with patient; however, patient declined at this time.    Preoperative Review of :   reviewed - controlled substances prescribed by other outside provider(s).      Status of Chronic Conditions:  See problem list for active medical problems.  Problems all longstanding and stable, except as noted/documented.  See ROS for pertinent symptoms related to these conditions.      Review of Systems  Constitutional, neuro, ENT, endocrine, pulmonary, cardiac, gastrointestinal, genitourinary, musculoskeletal, integument and psychiatric systems are negative, except as otherwise noted.    Patient Active Problem List    Diagnosis Date Noted     Anxiety 10/06/2020     Priority: Medium     Migraine without aura and without status migrainosus, not intractable 05/11/2020     Priority: Medium     Concussion without loss of consciousness 08/15/2019     Priority: Medium     Evaluated at [facility] with persistent symptoms. Erum Brewster MD 10/14/2019 2:14 PM           Past Medical History:   Diagnosis Date     Anxiety      Concussion      Migraine      Neuroendocrine neoplasm of appendix 02/2022     Past Surgical History:   Procedure Laterality Date     APPENDECTOMY  02/12/2022     DENTAL SURGERY      wisdom teeth     Current Outpatient Medications   Medication Sig Dispense Refill     buPROPion (WELLBUTRIN XL) 150 MG 24 hr tablet [BUPROPION (WELLBUTRIN XL) 150 MG 24 HR TABLET] Take 1 tablet (150 mg total) by mouth daily. (Patient taking differently: Take 150 mg by mouth every  "morning ) 90 tablet 3     butalbital-acetaminophen-caffeine (ESGIC) -40 MG tablet Take 1 tablet by mouth every 4 hours as needed for headaches 60 tablet 1     cefuroxime (CEFTIN) 500 MG tablet TAKE 1 TABLET BY MOUTH TWICE A DAY       cephALEXin (KEFLEX) 500 MG capsule TAKE 1 CAPSULE BY MOUTH EVERY 12 HOURS FOR 7 DAYS       etonogestrel (NEXPLANON) 68 mg Impl implant 1 each by Subdermal route once        metroNIDAZOLE (FLAGYL) 500 MG tablet Take 1 tablet (500 mg) by mouth every 6 hours At 8:00 am, 2:00 pm, 8:00 pm the day prior to your surgery with neomycin and zofran. 3 tablet 0     neomycin (MYCIFRADIN) 500 MG tablet Take 2 tablets (1,000 mg) by mouth every 6 hours At 8:00 am, 2:00 pm, 8:00 pm the day prior to your surgery with flagyl and zofran. 6 tablet 0     ondansetron (ZOFRAN) 4 MG tablet Take 1 tablet (4 mg) by mouth every 6 hours At 8:00 am, 2:00 pm, 8:00 pm the day prior to your surgery with neomycin and flagyl. 3 tablet 0     polyethylene glycol (MIRALAX) 17 g packet Take 238 g by mouth See Admin Instructions Starting at 4 pm night prior to surgery. Refer to \"Getting Ready for Surgery\" instructions. 14 packet 0     spironolactone (ALDACTONE) 50 MG tablet        Sulfacetamide Sodium-Sulfur 10-5 % LIQD APPLY TO FACE AND BACK ONCE DAILY       Sulfacetamide Sodium-Sulfur 10-5 % SUSP        traMADol (ULTRAM) 50 MG tablet TAKE 1 TABLET (50 MG) BY MOUTH EVERY 6 HOURS AS NEEDED FOR SEVERE PAIN FOR UP TO 3 DAYS       tretinoin (RETIN-A) 0.025 % external cream          Allergies   Allergen Reactions     Grass Extracts [Gramineae Pollens]      Other reaction(s): Other (see comments)  Seasonal     Other Environmental Allergy Unknown     Seasonal        Social History     Tobacco Use     Smoking status: Never Smoker     Smokeless tobacco: Never Used   Substance Use Topics     Alcohol use: Yes     Family History   Problem Relation Age of Onset     Basal cell carcinoma Mother      Hyperlipidemia Father      Anxiety " Disorder Sister      Anxiety Disorder Sister      No Known Problems Sister      Chronic Obstructive Pulmonary Disease Maternal Grandmother      Rheumatoid Arthritis Maternal Grandmother      Hypertension Maternal Grandmother      Depression Maternal Grandfather      Alzheimer Disease Maternal Grandfather      Prostate Cancer Maternal Grandfather      Pacemaker Paternal Grandmother      Deep Vein Thrombosis (DVT) No family hx of      History   Drug Use No         Objective     BP (!) 88/60 (BP Location: Right arm, Cuff Size: Adult Regular)   Pulse 84   Wt 53.2 kg (117 lb 4.8 oz)   LMP 03/29/2022   SpO2 99%   BMI 20.78 kg/m      Physical Exam    GENERAL APPEARANCE: healthy, alert and no distress     EYES: EOMI, PERRL     HENT: ear canals and TM's normal and nose and mouth without ulcers or lesions     NECK: no adenopathy, no asymmetry, masses, or scars and thyroid normal to palpation     RESP: lungs clear to auscultation - no rales, rhonchi or wheezes     CV: regular rates and rhythm, normal S1 S2, no S3 or S4 and no murmur, click or rub     ABDOMEN:  soft, nontender, no HSM or masses and bowel sounds normal     MS: extremities normal- no gross deformities noted, no evidence of inflammation in joints, FROM in all extremities.     SKIN: no suspicious lesions or rashes     NEURO: Normal strength and tone, sensory exam grossly normal, mentation intact and speech normal     PSYCH: mentation appears normal. and affect normal/bright     LYMPHATICS: No cervical adenopathy    Recent Labs   Lab Test 02/18/22  0920 07/06/20  1348   HGB 11.2* 13.7     --     141   POTASSIUM 4.3 4.4   CR 0.85 0.89        Diagnostics:  No labs were ordered during this visit.  Has lab appointment scheduled tomorrow  No EKG this visit, completed in the last 90 days.    Revised Cardiac Risk Index (RCRI):  The patient has the following serious cardiovascular risks for perioperative complications:   - No serious cardiac risks = 0  points     RCRI Interpretation: 0 points: Class I (very low risk - 0.4% complication rate)           Signed Electronically by: Delmy Arana MD  Copy of this evaluation report is provided to requesting physician.

## 2022-04-08 ENCOUNTER — LAB (OUTPATIENT)
Dept: LAB | Facility: CLINIC | Age: 22
End: 2022-04-08
Payer: COMMERCIAL

## 2022-04-08 ENCOUNTER — APPOINTMENT (OUTPATIENT)
Dept: LAB | Facility: CLINIC | Age: 22
End: 2022-04-08
Payer: COMMERCIAL

## 2022-04-08 DIAGNOSIS — D3A.8 NEUROENDOCRINE NEOPLASM OF APPENDIX (H): ICD-10-CM

## 2022-04-08 DIAGNOSIS — Z01.818 PREOP EXAMINATION: ICD-10-CM

## 2022-04-08 DIAGNOSIS — Z11.52 ENCOUNTER FOR PREOPERATIVE SCREENING LABORATORY TESTING FOR COVID-19 VIRUS: Primary | ICD-10-CM

## 2022-04-08 DIAGNOSIS — Z01.812 ENCOUNTER FOR PREOPERATIVE SCREENING LABORATORY TESTING FOR COVID-19 VIRUS: Primary | ICD-10-CM

## 2022-04-08 LAB
ABO/RH(D): NORMAL
ALBUMIN SERPL-MCNC: 4 G/DL (ref 3.5–5)
ALP SERPL-CCNC: 39 U/L (ref 45–120)
ALT SERPL W P-5'-P-CCNC: 12 U/L (ref 0–45)
ANION GAP SERPL CALCULATED.3IONS-SCNC: 10 MMOL/L (ref 5–18)
ANTIBODY SCREEN: NEGATIVE
AST SERPL W P-5'-P-CCNC: 15 U/L (ref 0–40)
BASOPHILS # BLD AUTO: 0 10E3/UL (ref 0–0.2)
BASOPHILS NFR BLD AUTO: 0 %
BILIRUB SERPL-MCNC: 0.3 MG/DL (ref 0–1)
BUN SERPL-MCNC: 13 MG/DL (ref 8–22)
CALCIUM SERPL-MCNC: 9.3 MG/DL (ref 8.5–10.5)
CHLORIDE BLD-SCNC: 107 MMOL/L (ref 98–107)
CO2 SERPL-SCNC: 24 MMOL/L (ref 22–31)
CREAT SERPL-MCNC: 0.76 MG/DL (ref 0.6–1.1)
EOSINOPHIL # BLD AUTO: 0.1 10E3/UL (ref 0–0.7)
EOSINOPHIL NFR BLD AUTO: 2 %
ERYTHROCYTE [DISTWIDTH] IN BLOOD BY AUTOMATED COUNT: 11.9 % (ref 10–15)
GFR SERPL CREATININE-BSD FRML MDRD: >90 ML/MIN/1.73M2
GLUCOSE BLD-MCNC: 69 MG/DL (ref 70–125)
HCT VFR BLD AUTO: 34.5 % (ref 35–47)
HGB BLD-MCNC: 11.5 G/DL (ref 11.7–15.7)
IMM GRANULOCYTES # BLD: 0 10E3/UL
IMM GRANULOCYTES NFR BLD: 0 %
LYMPHOCYTES # BLD AUTO: 1.7 10E3/UL (ref 0.8–5.3)
LYMPHOCYTES NFR BLD AUTO: 49 %
MCH RBC QN AUTO: 29.7 PG (ref 26.5–33)
MCHC RBC AUTO-ENTMCNC: 33.3 G/DL (ref 31.5–36.5)
MCV RBC AUTO: 89 FL (ref 78–100)
MONOCYTES # BLD AUTO: 0.3 10E3/UL (ref 0–1.3)
MONOCYTES NFR BLD AUTO: 10 %
NEUTROPHILS # BLD AUTO: 1.4 10E3/UL (ref 1.6–8.3)
NEUTROPHILS NFR BLD AUTO: 39 %
PLATELET # BLD AUTO: 238 10E3/UL (ref 150–450)
POTASSIUM BLD-SCNC: 4 MMOL/L (ref 3.5–5)
PREALB SERPL IA-MCNC: 23 MG/DL (ref 15–45)
PROT SERPL-MCNC: 6.5 G/DL (ref 6–8)
RBC # BLD AUTO: 3.87 10E6/UL (ref 3.8–5.2)
SARS-COV-2 RNA RESP QL NAA+PROBE: NEGATIVE
SODIUM SERPL-SCNC: 141 MMOL/L (ref 136–145)
SPECIMEN EXPIRATION DATE: NORMAL
WBC # BLD AUTO: 3.5 10E3/UL (ref 4–11)

## 2022-04-08 PROCEDURE — 86901 BLOOD TYPING SEROLOGIC RH(D): CPT

## 2022-04-08 PROCEDURE — 85025 COMPLETE CBC W/AUTO DIFF WBC: CPT

## 2022-04-08 PROCEDURE — 86850 RBC ANTIBODY SCREEN: CPT

## 2022-04-08 PROCEDURE — 36415 COLL VENOUS BLD VENIPUNCTURE: CPT

## 2022-04-08 PROCEDURE — 86900 BLOOD TYPING SEROLOGIC ABO: CPT

## 2022-04-08 PROCEDURE — U0005 INFEC AGEN DETEC AMPLI PROBE: HCPCS

## 2022-04-08 PROCEDURE — U0003 INFECTIOUS AGENT DETECTION BY NUCLEIC ACID (DNA OR RNA); SEVERE ACUTE RESPIRATORY SYNDROME CORONAVIRUS 2 (SARS-COV-2) (CORONAVIRUS DISEASE [COVID-19]), AMPLIFIED PROBE TECHNIQUE, MAKING USE OF HIGH THROUGHPUT TECHNOLOGIES AS DESCRIBED BY CMS-2020-01-R: HCPCS

## 2022-04-08 PROCEDURE — 84134 ASSAY OF PREALBUMIN: CPT

## 2022-04-08 PROCEDURE — 80053 COMPREHEN METABOLIC PANEL: CPT

## 2022-04-11 ENCOUNTER — TUMOR CONFERENCE (OUTPATIENT)
Dept: ONCOLOGY | Facility: CLINIC | Age: 22
End: 2022-04-11
Payer: COMMERCIAL

## 2022-04-11 ASSESSMENT — LIFESTYLE VARIABLES: TOBACCO_USE: 0

## 2022-04-11 ASSESSMENT — ENCOUNTER SYMPTOMS: SEIZURES: 0

## 2022-04-11 NOTE — ANESTHESIA PREPROCEDURE EVALUATION
Anesthesia Pre-Procedure Evaluation    Patient: Na De León   MRN: [id number] : 2000        Procedure : Procedure(s):  Robotic possible laparoscopic right hemicolectomy          Past Medical History:   Diagnosis Date     Anxiety      Concussion      Migraine      Neuroendocrine neoplasm of appendix 2022      Past Surgical History:   Procedure Laterality Date     APPENDECTOMY  2022     DENTAL SURGERY      wisdom teeth      Allergies   Allergen Reactions     Grass Extracts [Gramineae Pollens]      Other reaction(s): Other (see comments)  Seasonal     Other Environmental Allergy Unknown     Seasonal      Social History     Tobacco Use     Smoking status: Never Smoker     Smokeless tobacco: Never Used   Substance Use Topics     Alcohol use: Yes      Wt Readings from Last 1 Encounters:   22 53.2 kg (117 lb 4.8 oz)        Anesthesia Evaluation   Pt has had prior anesthetic. Type: General.    History of anesthetic complications  - PONV.      ROS/MED HX  ENT/Pulmonary:  - neg pulmonary ROS  (-) tobacco use, asthma and sleep apnea   Neurologic: Comment: Takes butalbital-acetaminophen-caffeine prn for migraines      (+) migraines,  (-) no seizures and no CVA   Cardiovascular:     (+) -----Previous cardiac testing   Echo: Date: 3/21/22 Results:  The left ventricle is normal in size with normal left ventricular wall  thickness.  Left ventricular function is normal.The ejection fraction is 60-65%.  Normal right ventricle size and systolic function.  No valve disease is identified.  There is no comparison study available.    Stress Test: Date: Results:    ECG Reviewed: Date: Results:    Cath: Date: Results:      METS/Exercise Tolerance: >4 METS    Hematologic:  - neg hematologic  ROS  (-) history of blood clots and history of blood transfusion   Musculoskeletal:  - neg musculoskeletal ROS     GI/Hepatic: Comment: Neuroendocrine tumor of the appendix. S/P appendectomy at [facility]  02/11/2022 .     (-) GERD and liver disease   Renal/Genitourinary:  - neg Renal ROS  (-) renal disease   Endo:  - neg endo ROS  (-) Type II DM   Psychiatric/Substance Use:     (+) psychiatric history anxiety     Infectious Disease:  - neg infectious disease ROS     Malignancy:   (+) Malignancy, History of GI.GI CA Active status post.        Other:  - neg other ROS          Physical Exam    Airway        Mallampati: II   TM distance: > 3 FB   Neck ROM: full   Mouth opening: > 3 cm    Respiratory Devices and Support         Dental  no notable dental history         Cardiovascular   cardiovascular exam normal          Pulmonary   pulmonary exam normal                OUTSIDE LABS:  CBC:   Lab Results   Component Value Date    WBC 3.5 (L) 04/08/2022    WBC 5.2 02/18/2022    HGB 11.5 (L) 04/08/2022    HGB 11.2 (L) 02/18/2022    HCT 34.5 (L) 04/08/2022    HCT 34.5 (L) 02/18/2022     04/08/2022     02/18/2022     BMP:   Lab Results   Component Value Date     04/08/2022     02/18/2022    POTASSIUM 4.0 04/08/2022    POTASSIUM 4.3 02/18/2022    CHLORIDE 107 04/08/2022    CHLORIDE 108 02/18/2022    CO2 24 04/08/2022    CO2 25 02/18/2022    BUN 13 04/08/2022    BUN 11 02/18/2022    CR 0.76 04/08/2022    CR 0.85 02/18/2022    GLC 69 (L) 04/08/2022    GLC 69 (L) 02/18/2022     COAGS: No results found for: PTT, INR, FIBR  POC:   Lab Results   Component Value Date    HCG Negative 01/08/2020     HEPATIC:   Lab Results   Component Value Date    ALBUMIN 4.0 04/08/2022    PROTTOTAL 6.5 04/08/2022    ALT 12 04/08/2022    AST 15 04/08/2022    ALKPHOS 39 (L) 04/08/2022    BILITOTAL 0.3 04/08/2022     OTHER:   Lab Results   Component Value Date    KONSTANTIN 9.3 04/08/2022       Anesthesia Plan    ASA Status:  2      Anesthesia Type: General.     - Airway: ETT   Induction: Intravenous, Propofol.   Maintenance: Balanced.   Techniques and Equipment:     - Lines/Monitors: 2nd IV     Consents    Anesthesia Plan(s) and  associated risks, benefits, and realistic alternatives discussed. Questions answered and patient/representative(s) expressed understanding.    - Discussed:     - Discussed with:  Patient      - Extended Intubation/Ventilatory Support Discussed: No.      - Patient is DNR/DNI Status: No    Use of blood products discussed: No .     Postoperative Care    Pain management: IV analgesics, Peripheral nerve block (Single Shot), Multi-modal analgesia.   PONV prophylaxis: Ondansetron (or other 5HT-3), Scopolamine patch, Aprepitant, Dexamethasone or Solumedrol     Comments:                Jerad Curran MD

## 2022-04-12 ENCOUNTER — HOSPITAL ENCOUNTER (INPATIENT)
Facility: CLINIC | Age: 22
LOS: 5 days | Discharge: HOME | DRG: 827 | End: 2022-04-17
Attending: SURGERY | Admitting: SURGERY
Payer: COMMERCIAL

## 2022-04-12 ENCOUNTER — ANESTHESIA (OUTPATIENT)
Dept: SURGERY | Facility: CLINIC | Age: 22
DRG: 827 | End: 2022-04-12
Payer: COMMERCIAL

## 2022-04-12 DIAGNOSIS — Z90.49 S/P RIGHT HEMICOLECTOMY: Primary | ICD-10-CM

## 2022-04-12 DIAGNOSIS — G89.18 ACUTE POST-OPERATIVE PAIN: ICD-10-CM

## 2022-04-12 LAB
CREAT SERPL-MCNC: 0.86 MG/DL (ref 0.52–1.04)
GFR SERPL CREATININE-BSD FRML MDRD: >90 ML/MIN/1.73M2
GLUCOSE BLDC GLUCOMTR-MCNC: 102 MG/DL (ref 70–99)

## 2022-04-12 PROCEDURE — 120N000002 HC R&B MED SURG/OB UMMC

## 2022-04-12 PROCEDURE — 250N000011 HC RX IP 250 OP 636: Performed by: ANESTHESIOLOGY

## 2022-04-12 PROCEDURE — 82565 ASSAY OF CREATININE: CPT

## 2022-04-12 PROCEDURE — 44205 LAP COLECTOMY PART W/ILEUM: CPT | Mod: GC | Performed by: SURGERY

## 2022-04-12 PROCEDURE — 272N000001 HC OR GENERAL SUPPLY STERILE: Performed by: SURGERY

## 2022-04-12 PROCEDURE — 710N000010 HC RECOVERY PHASE 1, LEVEL 2, PER MIN: Performed by: SURGERY

## 2022-04-12 PROCEDURE — 88305 TISSUE EXAM BY PATHOLOGIST: CPT | Mod: 26 | Performed by: PATHOLOGY

## 2022-04-12 PROCEDURE — 250N000011 HC RX IP 250 OP 636: Performed by: STUDENT IN AN ORGANIZED HEALTH CARE EDUCATION/TRAINING PROGRAM

## 2022-04-12 PROCEDURE — 250N000011 HC RX IP 250 OP 636

## 2022-04-12 PROCEDURE — 250N000013 HC RX MED GY IP 250 OP 250 PS 637

## 2022-04-12 PROCEDURE — 999N000141 HC STATISTIC PRE-PROCEDURE NURSING ASSESSMENT: Performed by: SURGERY

## 2022-04-12 PROCEDURE — 250N000009 HC RX 250: Performed by: STUDENT IN AN ORGANIZED HEALTH CARE EDUCATION/TRAINING PROGRAM

## 2022-04-12 PROCEDURE — 88309 TISSUE EXAM BY PATHOLOGIST: CPT | Mod: 26 | Performed by: PATHOLOGY

## 2022-04-12 PROCEDURE — 47001 NDL BIOPSY LVR TM OTH MAJ PX: CPT | Mod: GC | Performed by: SURGERY

## 2022-04-12 PROCEDURE — 250N000009 HC RX 250: Performed by: ANESTHESIOLOGY

## 2022-04-12 PROCEDURE — 250N000011 HC RX IP 250 OP 636: Performed by: SURGERY

## 2022-04-12 PROCEDURE — 258N000003 HC RX IP 258 OP 636: Performed by: ANESTHESIOLOGY

## 2022-04-12 PROCEDURE — 88331 PATH CONSLTJ SURG 1 BLK 1SPC: CPT | Mod: TC | Performed by: SURGERY

## 2022-04-12 PROCEDURE — 250N000013 HC RX MED GY IP 250 OP 250 PS 637: Performed by: SURGERY

## 2022-04-12 PROCEDURE — 250N000025 HC SEVOFLURANE, PER MIN: Performed by: SURGERY

## 2022-04-12 PROCEDURE — 36415 COLL VENOUS BLD VENIPUNCTURE: CPT

## 2022-04-12 PROCEDURE — 258N000003 HC RX IP 258 OP 636

## 2022-04-12 PROCEDURE — 370N000017 HC ANESTHESIA TECHNICAL FEE, PER MIN: Performed by: SURGERY

## 2022-04-12 PROCEDURE — 250N000012 HC RX MED GY IP 250 OP 636 PS 637: Performed by: ANESTHESIOLOGY

## 2022-04-12 PROCEDURE — 360N000080 HC SURGERY LEVEL 7, PER MIN: Performed by: SURGERY

## 2022-04-12 PROCEDURE — 88331 PATH CONSLTJ SURG 1 BLK 1SPC: CPT | Mod: 26 | Performed by: PATHOLOGY

## 2022-04-12 RX ORDER — OXYCODONE HYDROCHLORIDE 5 MG/1
5 TABLET ORAL EVERY 4 HOURS PRN
Status: CANCELLED | OUTPATIENT
Start: 2022-04-12

## 2022-04-12 RX ORDER — SODIUM CHLORIDE, SODIUM LACTATE, POTASSIUM CHLORIDE, CALCIUM CHLORIDE 600; 310; 30; 20 MG/100ML; MG/100ML; MG/100ML; MG/100ML
INJECTION, SOLUTION INTRAVENOUS CONTINUOUS
Status: DISCONTINUED | OUTPATIENT
Start: 2022-04-12 | End: 2022-04-12 | Stop reason: HOSPADM

## 2022-04-12 RX ORDER — PROPOFOL 10 MG/ML
INJECTION, EMULSION INTRAVENOUS PRN
Status: DISCONTINUED | OUTPATIENT
Start: 2022-04-12 | End: 2022-04-12

## 2022-04-12 RX ORDER — MEPERIDINE HYDROCHLORIDE 25 MG/ML
12.5 INJECTION INTRAMUSCULAR; INTRAVENOUS; SUBCUTANEOUS EVERY 5 MIN PRN
Status: COMPLETED | OUTPATIENT
Start: 2022-04-12 | End: 2022-04-12

## 2022-04-12 RX ORDER — FENTANYL CITRATE 50 UG/ML
INJECTION, SOLUTION INTRAMUSCULAR; INTRAVENOUS PRN
Status: DISCONTINUED | OUTPATIENT
Start: 2022-04-12 | End: 2022-04-12

## 2022-04-12 RX ORDER — ONDANSETRON 2 MG/ML
4 INJECTION INTRAMUSCULAR; INTRAVENOUS ONCE
Status: DISCONTINUED | OUTPATIENT
Start: 2022-04-12 | End: 2022-04-12 | Stop reason: HOSPADM

## 2022-04-12 RX ORDER — EPHEDRINE SULFATE 50 MG/ML
INJECTION, SOLUTION INTRAMUSCULAR; INTRAVENOUS; SUBCUTANEOUS PRN
Status: DISCONTINUED | OUTPATIENT
Start: 2022-04-12 | End: 2022-04-12

## 2022-04-12 RX ORDER — METRONIDAZOLE 500 MG/100ML
500 INJECTION, SOLUTION INTRAVENOUS
Status: COMPLETED | OUTPATIENT
Start: 2022-04-12 | End: 2022-04-12

## 2022-04-12 RX ORDER — SCOLOPAMINE TRANSDERMAL SYSTEM 1 MG/1
1 PATCH, EXTENDED RELEASE TRANSDERMAL ONCE
Status: DISCONTINUED | OUTPATIENT
Start: 2022-04-12 | End: 2022-04-12 | Stop reason: HOSPADM

## 2022-04-12 RX ORDER — OXYCODONE HYDROCHLORIDE 10 MG/1
10 TABLET ORAL EVERY 4 HOURS PRN
Status: DISCONTINUED | OUTPATIENT
Start: 2022-04-12 | End: 2022-04-17 | Stop reason: HOSPADM

## 2022-04-12 RX ORDER — SODIUM CHLORIDE, SODIUM LACTATE, POTASSIUM CHLORIDE, CALCIUM CHLORIDE 600; 310; 30; 20 MG/100ML; MG/100ML; MG/100ML; MG/100ML
INJECTION, SOLUTION INTRAVENOUS CONTINUOUS
Status: DISCONTINUED | OUTPATIENT
Start: 2022-04-12 | End: 2022-04-14

## 2022-04-12 RX ORDER — BUPIVACAINE HYDROCHLORIDE 2.5 MG/ML
INJECTION, SOLUTION EPIDURAL; INFILTRATION; INTRACAUDAL
Status: COMPLETED | OUTPATIENT
Start: 2022-04-12 | End: 2022-04-12

## 2022-04-12 RX ORDER — FENTANYL CITRATE 50 UG/ML
25-50 INJECTION, SOLUTION INTRAMUSCULAR; INTRAVENOUS
Status: DISCONTINUED | OUTPATIENT
Start: 2022-04-12 | End: 2022-04-12 | Stop reason: HOSPADM

## 2022-04-12 RX ORDER — LABETALOL HYDROCHLORIDE 5 MG/ML
10 INJECTION, SOLUTION INTRAVENOUS
Status: DISCONTINUED | OUTPATIENT
Start: 2022-04-12 | End: 2022-04-12 | Stop reason: HOSPADM

## 2022-04-12 RX ORDER — HYDROMORPHONE HCL IN WATER/PF 6 MG/30 ML
0.4 PATIENT CONTROLLED ANALGESIA SYRINGE INTRAVENOUS
Status: DISCONTINUED | OUTPATIENT
Start: 2022-04-12 | End: 2022-04-13

## 2022-04-12 RX ORDER — NALOXONE HYDROCHLORIDE 0.4 MG/ML
0.2 INJECTION, SOLUTION INTRAMUSCULAR; INTRAVENOUS; SUBCUTANEOUS
Status: DISCONTINUED | OUTPATIENT
Start: 2022-04-12 | End: 2022-04-17 | Stop reason: HOSPADM

## 2022-04-12 RX ORDER — DEXAMETHASONE SODIUM PHOSPHATE 4 MG/ML
INJECTION, SOLUTION INTRA-ARTICULAR; INTRALESIONAL; INTRAMUSCULAR; INTRAVENOUS; SOFT TISSUE PRN
Status: DISCONTINUED | OUTPATIENT
Start: 2022-04-12 | End: 2022-04-12

## 2022-04-12 RX ORDER — FLUMAZENIL 0.1 MG/ML
0.2 INJECTION, SOLUTION INTRAVENOUS
Status: DISCONTINUED | OUTPATIENT
Start: 2022-04-12 | End: 2022-04-12 | Stop reason: HOSPADM

## 2022-04-12 RX ORDER — OXYCODONE HYDROCHLORIDE 5 MG/1
5 TABLET ORAL EVERY 4 HOURS PRN
Status: DISCONTINUED | OUTPATIENT
Start: 2022-04-12 | End: 2022-04-17 | Stop reason: HOSPADM

## 2022-04-12 RX ORDER — ACETAMINOPHEN 325 MG/1
975 TABLET ORAL EVERY 8 HOURS
Status: DISCONTINUED | OUTPATIENT
Start: 2022-04-12 | End: 2022-04-13

## 2022-04-12 RX ORDER — DIPHENHYDRAMINE HCL 25 MG
25 CAPSULE ORAL EVERY 6 HOURS PRN
Status: DISCONTINUED | OUTPATIENT
Start: 2022-04-12 | End: 2022-04-12 | Stop reason: HOSPADM

## 2022-04-12 RX ORDER — GABAPENTIN 100 MG/1
100 CAPSULE ORAL 3 TIMES DAILY
Status: DISCONTINUED | OUTPATIENT
Start: 2022-04-12 | End: 2022-04-17 | Stop reason: HOSPADM

## 2022-04-12 RX ORDER — APREPITANT 40 MG/1
40 CAPSULE ORAL DAILY
Status: DISCONTINUED | OUTPATIENT
Start: 2022-04-12 | End: 2022-04-12 | Stop reason: HOSPADM

## 2022-04-12 RX ORDER — NALOXONE HYDROCHLORIDE 0.4 MG/ML
0.4 INJECTION, SOLUTION INTRAMUSCULAR; INTRAVENOUS; SUBCUTANEOUS
Status: DISCONTINUED | OUTPATIENT
Start: 2022-04-12 | End: 2022-04-17 | Stop reason: HOSPADM

## 2022-04-12 RX ORDER — INDOCYANINE GREEN AND WATER 25 MG
KIT INJECTION PRN
Status: DISCONTINUED | OUTPATIENT
Start: 2022-04-12 | End: 2022-04-12

## 2022-04-12 RX ORDER — ONDANSETRON 2 MG/ML
INJECTION INTRAMUSCULAR; INTRAVENOUS PRN
Status: DISCONTINUED | OUTPATIENT
Start: 2022-04-12 | End: 2022-04-12

## 2022-04-12 RX ORDER — NALOXONE HYDROCHLORIDE 0.4 MG/ML
0.2 INJECTION, SOLUTION INTRAMUSCULAR; INTRAVENOUS; SUBCUTANEOUS
Status: DISCONTINUED | OUTPATIENT
Start: 2022-04-12 | End: 2022-04-12 | Stop reason: HOSPADM

## 2022-04-12 RX ORDER — CEFAZOLIN SODIUM/WATER 2 G/20 ML
2 SYRINGE (ML) INTRAVENOUS SEE ADMIN INSTRUCTIONS
Status: DISCONTINUED | OUTPATIENT
Start: 2022-04-12 | End: 2022-04-12 | Stop reason: HOSPADM

## 2022-04-12 RX ORDER — SCOLOPAMINE TRANSDERMAL SYSTEM 1 MG/1
1 PATCH, EXTENDED RELEASE TRANSDERMAL ONCE
Status: COMPLETED | OUTPATIENT
Start: 2022-04-12 | End: 2022-04-13

## 2022-04-12 RX ORDER — HALOPERIDOL 5 MG/ML
1 INJECTION INTRAMUSCULAR
Status: DISCONTINUED | OUTPATIENT
Start: 2022-04-12 | End: 2022-04-12 | Stop reason: HOSPADM

## 2022-04-12 RX ORDER — ONDANSETRON 2 MG/ML
4 INJECTION INTRAMUSCULAR; INTRAVENOUS EVERY 6 HOURS PRN
Status: DISCONTINUED | OUTPATIENT
Start: 2022-04-12 | End: 2022-04-17 | Stop reason: HOSPADM

## 2022-04-12 RX ORDER — LIDOCAINE 40 MG/G
CREAM TOPICAL
Status: DISCONTINUED | OUTPATIENT
Start: 2022-04-12 | End: 2022-04-17 | Stop reason: HOSPADM

## 2022-04-12 RX ORDER — METHOCARBAMOL 100 MG/ML
1000 INJECTION, SOLUTION INTRAMUSCULAR; INTRAVENOUS ONCE
Status: DISCONTINUED | OUTPATIENT
Start: 2022-04-12 | End: 2022-04-12

## 2022-04-12 RX ORDER — DIAZEPAM 10 MG/2ML
2.5 INJECTION, SOLUTION INTRAMUSCULAR; INTRAVENOUS
Status: DISCONTINUED | OUTPATIENT
Start: 2022-04-12 | End: 2022-04-12 | Stop reason: HOSPADM

## 2022-04-12 RX ORDER — ONDANSETRON 4 MG/1
4 TABLET, ORALLY DISINTEGRATING ORAL EVERY 6 HOURS PRN
Status: DISCONTINUED | OUTPATIENT
Start: 2022-04-12 | End: 2022-04-17 | Stop reason: HOSPADM

## 2022-04-12 RX ORDER — NALOXONE HYDROCHLORIDE 0.4 MG/ML
0.4 INJECTION, SOLUTION INTRAMUSCULAR; INTRAVENOUS; SUBCUTANEOUS
Status: DISCONTINUED | OUTPATIENT
Start: 2022-04-12 | End: 2022-04-12 | Stop reason: HOSPADM

## 2022-04-12 RX ORDER — CEFAZOLIN SODIUM/WATER 2 G/20 ML
2 SYRINGE (ML) INTRAVENOUS
Status: COMPLETED | OUTPATIENT
Start: 2022-04-12 | End: 2022-04-12

## 2022-04-12 RX ORDER — DIPHENHYDRAMINE HYDROCHLORIDE 50 MG/ML
25 INJECTION INTRAMUSCULAR; INTRAVENOUS EVERY 6 HOURS PRN
Status: DISCONTINUED | OUTPATIENT
Start: 2022-04-12 | End: 2022-04-12 | Stop reason: HOSPADM

## 2022-04-12 RX ORDER — HYDROMORPHONE HYDROCHLORIDE 1 MG/ML
0.2 INJECTION, SOLUTION INTRAMUSCULAR; INTRAVENOUS; SUBCUTANEOUS EVERY 5 MIN PRN
Status: DISCONTINUED | OUTPATIENT
Start: 2022-04-12 | End: 2022-04-12 | Stop reason: HOSPADM

## 2022-04-12 RX ORDER — ACETAMINOPHEN 325 MG/1
650 TABLET ORAL EVERY 4 HOURS PRN
Status: DISCONTINUED | OUTPATIENT
Start: 2022-04-15 | End: 2022-04-17 | Stop reason: HOSPADM

## 2022-04-12 RX ORDER — ONDANSETRON 2 MG/ML
4 INJECTION INTRAMUSCULAR; INTRAVENOUS EVERY 30 MIN PRN
Status: DISCONTINUED | OUTPATIENT
Start: 2022-04-12 | End: 2022-04-12 | Stop reason: HOSPADM

## 2022-04-12 RX ORDER — DEXAMETHASONE SODIUM PHOSPHATE 10 MG/ML
INJECTION, SOLUTION INTRAMUSCULAR; INTRAVENOUS
Status: COMPLETED | OUTPATIENT
Start: 2022-04-12 | End: 2022-04-12

## 2022-04-12 RX ORDER — HYDROMORPHONE HCL IN WATER/PF 6 MG/30 ML
0.2 PATIENT CONTROLLED ANALGESIA SYRINGE INTRAVENOUS
Status: DISCONTINUED | OUTPATIENT
Start: 2022-04-12 | End: 2022-04-17 | Stop reason: HOSPADM

## 2022-04-12 RX ORDER — SODIUM CHLORIDE, SODIUM LACTATE, POTASSIUM CHLORIDE, CALCIUM CHLORIDE 600; 310; 30; 20 MG/100ML; MG/100ML; MG/100ML; MG/100ML
INJECTION, SOLUTION INTRAVENOUS CONTINUOUS PRN
Status: DISCONTINUED | OUTPATIENT
Start: 2022-04-12 | End: 2022-04-12

## 2022-04-12 RX ORDER — ONDANSETRON 4 MG/1
4 TABLET, ORALLY DISINTEGRATING ORAL EVERY 30 MIN PRN
Status: DISCONTINUED | OUTPATIENT
Start: 2022-04-12 | End: 2022-04-12 | Stop reason: HOSPADM

## 2022-04-12 RX ORDER — DEXMEDETOMIDINE HYDROCHLORIDE 4 UG/ML
INJECTION, SOLUTION INTRAVENOUS
Status: COMPLETED | OUTPATIENT
Start: 2022-04-12 | End: 2022-04-12

## 2022-04-12 RX ORDER — FENTANYL CITRATE 50 UG/ML
25 INJECTION, SOLUTION INTRAMUSCULAR; INTRAVENOUS EVERY 5 MIN PRN
Status: DISCONTINUED | OUTPATIENT
Start: 2022-04-12 | End: 2022-04-12 | Stop reason: HOSPADM

## 2022-04-12 RX ORDER — METHOCARBAMOL 500 MG/1
500 TABLET, FILM COATED ORAL 4 TIMES DAILY PRN
Status: DISCONTINUED | OUTPATIENT
Start: 2022-04-12 | End: 2022-04-17 | Stop reason: HOSPADM

## 2022-04-12 RX ORDER — ACETAMINOPHEN 325 MG/1
975 TABLET ORAL ONCE
Status: COMPLETED | OUTPATIENT
Start: 2022-04-12 | End: 2022-04-12

## 2022-04-12 RX ORDER — HYDRALAZINE HYDROCHLORIDE 20 MG/ML
2.5-5 INJECTION INTRAMUSCULAR; INTRAVENOUS EVERY 10 MIN PRN
Status: DISCONTINUED | OUTPATIENT
Start: 2022-04-12 | End: 2022-04-12 | Stop reason: HOSPADM

## 2022-04-12 RX ADMIN — PHENYLEPHRINE HYDROCHLORIDE 100 MCG: 10 INJECTION INTRAVENOUS at 12:50

## 2022-04-12 RX ADMIN — HYDROMORPHONE HYDROCHLORIDE 0.2 MG: 0.2 INJECTION, SOLUTION INTRAMUSCULAR; INTRAVENOUS; SUBCUTANEOUS at 21:41

## 2022-04-12 RX ADMIN — MEPERIDINE HYDROCHLORIDE 12.5 MG: 25 INJECTION INTRAMUSCULAR; INTRAVENOUS; SUBCUTANEOUS at 14:22

## 2022-04-12 RX ADMIN — ACETAMINOPHEN 975 MG: 325 TABLET ORAL at 17:14

## 2022-04-12 RX ADMIN — OXYCODONE HYDROCHLORIDE 5 MG: 5 TABLET ORAL at 23:15

## 2022-04-12 RX ADMIN — SODIUM CHLORIDE, POTASSIUM CHLORIDE, SODIUM LACTATE AND CALCIUM CHLORIDE: 600; 310; 30; 20 INJECTION, SOLUTION INTRAVENOUS at 14:34

## 2022-04-12 RX ADMIN — PHENYLEPHRINE HYDROCHLORIDE 100 MCG: 10 INJECTION INTRAVENOUS at 08:49

## 2022-04-12 RX ADMIN — ACETAMINOPHEN 975 MG: 325 TABLET ORAL at 23:15

## 2022-04-12 RX ADMIN — SCOPALAMINE 1 PATCH: 1 PATCH, EXTENDED RELEASE TRANSDERMAL at 06:51

## 2022-04-12 RX ADMIN — MIDAZOLAM 1 MG: 1 INJECTION INTRAMUSCULAR; INTRAVENOUS at 13:56

## 2022-04-12 RX ADMIN — PHENYLEPHRINE HYDROCHLORIDE 100 MCG: 10 INJECTION INTRAVENOUS at 12:09

## 2022-04-12 RX ADMIN — SUGAMMADEX 125 MG: 100 INJECTION, SOLUTION INTRAVENOUS at 13:29

## 2022-04-12 RX ADMIN — FENTANYL CITRATE 50 MCG: 50 INJECTION, SOLUTION INTRAMUSCULAR; INTRAVENOUS at 07:52

## 2022-04-12 RX ADMIN — OXYCODONE HYDROCHLORIDE 5 MG: 5 TABLET ORAL at 14:56

## 2022-04-12 RX ADMIN — Medication 5 MG: at 11:02

## 2022-04-12 RX ADMIN — PHENYLEPHRINE HYDROCHLORIDE 100 MCG: 10 INJECTION INTRAVENOUS at 10:23

## 2022-04-12 RX ADMIN — ROCURONIUM BROMIDE 10 MG: 50 INJECTION, SOLUTION INTRAVENOUS at 09:46

## 2022-04-12 RX ADMIN — GABAPENTIN 100 MG: 100 CAPSULE ORAL at 20:13

## 2022-04-12 RX ADMIN — Medication 5 MG: at 09:36

## 2022-04-12 RX ADMIN — ROCURONIUM BROMIDE 50 MG: 50 INJECTION, SOLUTION INTRAVENOUS at 08:31

## 2022-04-12 RX ADMIN — PHENYLEPHRINE HYDROCHLORIDE 100 MCG: 10 INJECTION INTRAVENOUS at 11:42

## 2022-04-12 RX ADMIN — BUPIVACAINE HYDROCHLORIDE 60 ML: 2.5 INJECTION, SOLUTION EPIDURAL; INFILTRATION; INTRACAUDAL; PERINEURAL at 07:54

## 2022-04-12 RX ADMIN — Medication 2 G: at 12:14

## 2022-04-12 RX ADMIN — METRONIDAZOLE 500 MG: 500 INJECTION, SOLUTION INTRAVENOUS at 08:30

## 2022-04-12 RX ADMIN — FENTANYL CITRATE 100 MCG: 50 INJECTION, SOLUTION INTRAMUSCULAR; INTRAVENOUS at 08:30

## 2022-04-12 RX ADMIN — Medication 10 MCG: at 07:54

## 2022-04-12 RX ADMIN — SODIUM CHLORIDE, POTASSIUM CHLORIDE, SODIUM LACTATE AND CALCIUM CHLORIDE: 600; 310; 30; 20 INJECTION, SOLUTION INTRAVENOUS at 09:37

## 2022-04-12 RX ADMIN — ROCURONIUM BROMIDE 10 MG: 50 INJECTION, SOLUTION INTRAVENOUS at 11:02

## 2022-04-12 RX ADMIN — ACETAMINOPHEN 975 MG: 325 TABLET, FILM COATED ORAL at 06:54

## 2022-04-12 RX ADMIN — ROCURONIUM BROMIDE 10 MG: 50 INJECTION, SOLUTION INTRAVENOUS at 09:09

## 2022-04-12 RX ADMIN — PHENYLEPHRINE HYDROCHLORIDE 100 MCG: 10 INJECTION INTRAVENOUS at 08:55

## 2022-04-12 RX ADMIN — PHENYLEPHRINE HYDROCHLORIDE 100 MCG: 10 INJECTION INTRAVENOUS at 09:11

## 2022-04-12 RX ADMIN — FENTANYL CITRATE 50 MCG: 50 INJECTION, SOLUTION INTRAMUSCULAR; INTRAVENOUS at 13:27

## 2022-04-12 RX ADMIN — SODIUM CHLORIDE, POTASSIUM CHLORIDE, SODIUM LACTATE AND CALCIUM CHLORIDE: 600; 310; 30; 20 INJECTION, SOLUTION INTRAVENOUS at 08:13

## 2022-04-12 RX ADMIN — MEPERIDINE HYDROCHLORIDE 12.5 MG: 25 INJECTION INTRAMUSCULAR; INTRAVENOUS; SUBCUTANEOUS at 14:16

## 2022-04-12 RX ADMIN — SODIUM CHLORIDE, POTASSIUM CHLORIDE, SODIUM LACTATE AND CALCIUM CHLORIDE 500 ML: 600; 310; 30; 20 INJECTION, SOLUTION INTRAVENOUS at 19:47

## 2022-04-12 RX ADMIN — Medication 2 G: at 08:14

## 2022-04-12 RX ADMIN — ONDANSETRON 4 MG: 2 INJECTION INTRAMUSCULAR; INTRAVENOUS at 13:51

## 2022-04-12 RX ADMIN — Medication 5 MG: at 10:23

## 2022-04-12 RX ADMIN — ROCURONIUM BROMIDE 10 MG: 50 INJECTION, SOLUTION INTRAVENOUS at 10:05

## 2022-04-12 RX ADMIN — DEXAMETHASONE SODIUM PHOSPHATE 8 MG: 4 INJECTION, SOLUTION INTRA-ARTICULAR; INTRALESIONAL; INTRAMUSCULAR; INTRAVENOUS; SOFT TISSUE at 08:30

## 2022-04-12 RX ADMIN — MIDAZOLAM 1 MG: 1 INJECTION INTRAMUSCULAR; INTRAVENOUS at 08:01

## 2022-04-12 RX ADMIN — Medication 5 MG: at 09:23

## 2022-04-12 RX ADMIN — PHENYLEPHRINE HYDROCHLORIDE 100 MCG: 10 INJECTION INTRAVENOUS at 09:17

## 2022-04-12 RX ADMIN — ONDANSETRON 4 MG: 4 TABLET, ORALLY DISINTEGRATING ORAL at 20:12

## 2022-04-12 RX ADMIN — ROCURONIUM BROMIDE 20 MG: 50 INJECTION, SOLUTION INTRAVENOUS at 12:09

## 2022-04-12 RX ADMIN — INDOCYANINE GREEN AND WATER 5 MG: KIT at 11:28

## 2022-04-12 RX ADMIN — ROCURONIUM BROMIDE 10 MG: 50 INJECTION, SOLUTION INTRAVENOUS at 10:32

## 2022-04-12 RX ADMIN — FENTANYL CITRATE 50 MCG: 50 INJECTION, SOLUTION INTRAMUSCULAR; INTRAVENOUS at 13:10

## 2022-04-12 RX ADMIN — HYDROMORPHONE HYDROCHLORIDE 0.2 MG: 0.2 INJECTION, SOLUTION INTRAMUSCULAR; INTRAVENOUS; SUBCUTANEOUS at 18:16

## 2022-04-12 RX ADMIN — METHOCARBAMOL 500 MG: 500 TABLET ORAL at 20:13

## 2022-04-12 RX ADMIN — ONDANSETRON 4 MG: 2 INJECTION INTRAMUSCULAR; INTRAVENOUS at 13:02

## 2022-04-12 RX ADMIN — PROPOFOL 160 MG: 10 INJECTION, EMULSION INTRAVENOUS at 08:30

## 2022-04-12 RX ADMIN — PHENYLEPHRINE HYDROCHLORIDE 100 MCG: 10 INJECTION INTRAVENOUS at 11:02

## 2022-04-12 RX ADMIN — PROCHLORPERAZINE EDISYLATE 5 MG: 5 INJECTION INTRAMUSCULAR; INTRAVENOUS at 14:26

## 2022-04-12 RX ADMIN — MIDAZOLAM 1 MG: 1 INJECTION INTRAMUSCULAR; INTRAVENOUS at 08:10

## 2022-04-12 RX ADMIN — DEXAMETHASONE SODIUM PHOSPHATE 1 MG: 10 INJECTION, SOLUTION INTRAMUSCULAR; INTRAVENOUS at 07:54

## 2022-04-12 RX ADMIN — PHENYLEPHRINE HYDROCHLORIDE 100 MCG: 10 INJECTION INTRAVENOUS at 09:36

## 2022-04-12 RX ADMIN — APREPITANT 40 MG: 40 CAPSULE ORAL at 07:48

## 2022-04-12 RX ADMIN — ROCURONIUM BROMIDE 10 MG: 50 INJECTION, SOLUTION INTRAVENOUS at 11:33

## 2022-04-12 RX ADMIN — ENOXAPARIN SODIUM 40 MG: 40 INJECTION SUBCUTANEOUS at 08:02

## 2022-04-12 RX ADMIN — MIDAZOLAM 1 MG: 1 INJECTION INTRAMUSCULAR; INTRAVENOUS at 08:05

## 2022-04-12 ASSESSMENT — ACTIVITIES OF DAILY LIVING (ADL)
ADLS_ACUITY_SCORE: 3
ADLS_ACUITY_SCORE: 10
ADLS_ACUITY_SCORE: 3

## 2022-04-12 NOTE — DISCHARGE INSTRUCTIONS
Because of the increase in pregnancy risk, the patient is asked to back up her OCP (if used) with condom or other barrier method during this cycle because of the use of Aprepitant and Sugammedex.

## 2022-04-12 NOTE — ANESTHESIA CARE TRANSFER NOTE
Patient: Na De León    Procedure: Procedure(s):  Robotic right hemicolectomy, true cut liver biopsy x2. Indocyanine green (ICG) angiography.       Diagnosis: Neuroendocrine neoplasm of appendix [D3A.8]  Diagnosis Additional Information: No value filed.    Anesthesia Type:   General     Note:    Oropharynx: oropharynx clear of all foreign objects and spontaneously breathing  Level of Consciousness: drowsy  Oxygen Supplementation: face mask  Level of Supplemental Oxygen (L/min / FiO2): 6  Independent Airway: airway patency satisfactory and stable  Dentition: dentition unchanged  Vital Signs Stable: post-procedure vital signs reviewed and stable  Report to RN Given: handoff report given  Patient transferred to: PACU    Handoff Report: Identifed the Patient, Identified the Reponsible Provider, Reviewed the pertinent medical history, Discussed the surgical course, Reviewed Intra-OP anesthesia mangement and issues during anesthesia, Set expectations for post-procedure period and Allowed opportunity for questions and acknowledgement of understanding      Vitals:  Vitals Value Taken Time   /71 04/12/22 1343   Temp     Pulse 89 04/12/22 1344   Resp 16 04/12/22 1344   SpO2 100 % 04/12/22 1344   Vitals shown include unvalidated device data.    Electronically Signed By: KIARRA Villar CRNA  April 12, 2022  1:46 PM

## 2022-04-12 NOTE — ANESTHESIA PROCEDURE NOTES
TAP Procedure Note    Pre-Procedure   Staff -        Resident/Fellow: Uzair Young MD       Performed By: resident       Location: pre-op       Procedure Start/Stop Times: 4/12/2022 7:54 AM and 4/12/2022 7:58 AM       Pre-Anesthestic Checklist: patient identified, IV checked, site marked, risks and benefits discussed, informed consent, monitors and equipment checked, pre-op evaluation, at physician/surgeon's request and post-op pain management  Timeout:       Correct Patient: Yes        Correct Procedure: Yes        Correct Site: Yes        Correct Position: Yes        Correct Laterality: Yes        Site Marked: Yes  Procedure Documentation  Procedure: TAP       Laterality: bilateral       Patient Position: supine       Skin prep: Chloraprep       Needle Type: insulated       Needle Gauge: 21.        Needle Length (millimeters): 110        Ultrasound guided       1. Ultrasound was used to identify targeted nerve, plexus, vascular marker, or fascial plane and place a needle adjacent to it in real-time.       2. Ultrasound was used to visualize the spread of anesthetic in close proximity to the above referenced structure.    Assessment/Narrative         The placement was negative for: blood aspirated and painful injection       Paresthesias: No.       Bolus given via needle..        Secured via.        Insertion/Infusion Method: Single Shot       Complications: none    Medication(s) Administered   Bupivacaine 0.25% PF (Infiltration) - Infiltration   60 mL - 4/12/2022 7:54:00 AM  Dexamethasone 10 mg/mL PF (Perineural) - Perineural   1 mg - 4/12/2022 7:54:00 AM  Dexmedetomidine 4 mcg/mL (Perineural) - Perineural   10 mcg - 4/12/2022 7:54:00 AM  Medication Administration Time: 4/12/2022 7:54 AM

## 2022-04-12 NOTE — ANESTHESIA PROCEDURE NOTES
Airway       Patient location during procedure: OR       Procedure Start/Stop Times: 4/12/2022 8:33 AM  Staff -        CRNA: Deysi Zaldivar APRN CRNA       Performed By: CRNA  Consent for Airway        Urgency: elective  Indications and Patient Condition       Indications for airway management: jenifer-procedural       Induction type:intravenous       Mask difficulty assessment: 1 - vent by mask    Final Airway Details       Final airway type: endotracheal airway       Successful airway: ETT - single  Endotracheal Airway Details        ETT size (mm): 7.0       Cuffed: yes       Successful intubation technique: direct laryngoscopy       DL Blade Type: Cabral 2       Grade View of Cords: 1       Adjucts: stylet       Measured from: gums/teeth       Secured at (cm): 22    Post intubation assessment        Placement verified by: capnometry        Number of attempts at approach: 1       Ease of procedure: easy       Dentition: Intact    Medication(s) Administered   Medication Administration Time: 4/12/2022 8:33 AM

## 2022-04-12 NOTE — ANESTHESIA POSTPROCEDURE EVALUATION
Patient: Na De León    Procedure: Procedure(s):  Robotic right hemicolectomy, true cut liver biopsy x2. Indocyanine green (ICG) angiography.       Anesthesia Type:  General    Note:  Disposition: Admission   Postop Pain Control: Challenging            Challenges/Interventions: Multimodal therapy            Sign Out: ONGOING pain issues   PONV: No   Neuro/Psych: Uneventful            Sign Out: Acceptable/Baseline neuro status   Airway/Respiratory: Uneventful            Sign Out: Acceptable/Baseline resp. status   CV/Hemodynamics: Uneventful            Sign Out: Acceptable CV status; No obvious hypovolemia; No obvious fluid overload   Other NRE: NONE   DID A NON-ROUTINE EVENT OCCUR? No           Last vitals:  Vitals Value Taken Time   /72 04/12/22 1500   Temp 37.1  C (98.8  F) 04/12/22 1430   Pulse 101 04/12/22 1504   Resp 17 04/12/22 1504   SpO2 99 % 04/12/22 1504   Vitals shown include unvalidated device data.    Electronically Signed By: Uzair Young MD  April 12, 2022  3:06 PM

## 2022-04-12 NOTE — BRIEF OP NOTE
[facility]    Brief Operative Note    Pre-operative diagnosis: Neuroendocrine neoplasm of appendix [D3A.8]  Post-operative diagnosis Same as pre-operative diagnosis    Procedure: Procedure(s):  Robotic right hemicolectomy, true cut liver biopsy x2. Indocyanine green (ICG) angiography.  Surgeon: Surgeon(s) and Role:     * Jacinto Campbell MD - Primary        Stephan Ivey MD - Fellow  Anesthesia: Combined General with Block   Estimated Blood Loss: 50 mL from 4/12/2022  8:12 AM to 4/12/2022  1:41 PM      Drains:  None  Specimens:   ID Type Source Tests Collected by Time Destination   1 : Left lobe liver lesion Tissue Liver SURGICAL PATHOLOGY EXAM Jacinto Campbell MD 4/12/2022  9:41 AM    2 : Right lobe liver lesion Tissue Liver SURGICAL PATHOLOGY EXAM Jacinto Campbell MD 4/12/2022  9:47 AM    3 : Right colon terminal ileum Tissue Large Intestine, Colon SURGICAL PATHOLOGY EXAM Jacinto Campbell MD 4/12/2022 12:41 PM      Findings:   Liver lesions true cut biopsy frozen section negative for tumor. No evidence of metastatic disease.   Complications: None.  Implants: None

## 2022-04-12 NOTE — PLAN OF CARE
Goal Outcome Evaluation:  Plan of Care Reviewed With: patient, parent   Overall Patient Progress: no change    Cares assumed at around 1535: Pt and parents oriented to room, call light use. Pt refused skin assessment at this time d/t pain.    Pt admitted from PACU post Robotic right hemicolectomy, true cut liver biopsy x2. Indocyanine green (ICG) angiography.    VSS except had episodes of apnea on admission-MD notified and saw pt, will consider about being conservative on narcs for pain.  LS clear, IS use still needed to be taught .  BS not audible. Valdez patent.  On clear liquids, sips of water with meds.  Pain in abd when awake is 10/10. Scheduled tylenol given abd prn IV dilaudid(0.2mg) given with some relief.  Abd lap sites with intact derma bond-LETICIA.   Left wrist PIV infusing with LR at 75cc/hr, rt hand piv I sl'd.  Monitor VS, pain, incision/drains, ROBF and gen status and continue with POC.

## 2022-04-13 ENCOUNTER — APPOINTMENT (OUTPATIENT)
Dept: PHYSICAL THERAPY | Facility: CLINIC | Age: 22
DRG: 827 | End: 2022-04-13
Attending: SURGERY
Payer: COMMERCIAL

## 2022-04-13 LAB
ANION GAP SERPL CALCULATED.3IONS-SCNC: 4 MMOL/L (ref 3–14)
BUN SERPL-MCNC: 6 MG/DL (ref 7–30)
CALCIUM SERPL-MCNC: 8.2 MG/DL (ref 8.5–10.1)
CHLORIDE BLD-SCNC: 109 MMOL/L (ref 94–109)
CO2 SERPL-SCNC: 26 MMOL/L (ref 20–32)
CREAT SERPL-MCNC: 0.78 MG/DL (ref 0.52–1.04)
ERYTHROCYTE [DISTWIDTH] IN BLOOD BY AUTOMATED COUNT: 12.5 % (ref 10–15)
GFR SERPL CREATININE-BSD FRML MDRD: >90 ML/MIN/1.73M2
GLUCOSE BLD-MCNC: 88 MG/DL (ref 70–99)
HCT VFR BLD AUTO: 29 % (ref 35–47)
HGB BLD-MCNC: 9.4 G/DL (ref 11.7–15.7)
MCH RBC QN AUTO: 29.7 PG (ref 26.5–33)
MCHC RBC AUTO-ENTMCNC: 32.4 G/DL (ref 31.5–36.5)
MCV RBC AUTO: 92 FL (ref 78–100)
PLATELET # BLD AUTO: 235 10E3/UL (ref 150–450)
POTASSIUM BLD-SCNC: 3.9 MMOL/L (ref 3.4–5.3)
RBC # BLD AUTO: 3.17 10E6/UL (ref 3.8–5.2)
SODIUM SERPL-SCNC: 139 MMOL/L (ref 133–144)
WBC # BLD AUTO: 7.9 10E3/UL (ref 4–11)

## 2022-04-13 PROCEDURE — 250N000011 HC RX IP 250 OP 636: Performed by: PHYSICIAN ASSISTANT

## 2022-04-13 PROCEDURE — 250N000013 HC RX MED GY IP 250 OP 250 PS 637: Performed by: PHYSICIAN ASSISTANT

## 2022-04-13 PROCEDURE — 0DTF4ZZ RESECTION OF RIGHT LARGE INTESTINE, PERCUTANEOUS ENDOSCOPIC APPROACH: ICD-10-PCS | Performed by: SURGERY

## 2022-04-13 PROCEDURE — 0DJD4ZZ INSPECTION OF LOWER INTESTINAL TRACT, PERCUTANEOUS ENDOSCOPIC APPROACH: ICD-10-PCS | Performed by: SURGERY

## 2022-04-13 PROCEDURE — 250N000013 HC RX MED GY IP 250 OP 250 PS 637

## 2022-04-13 PROCEDURE — 8E0W4CZ ROBOTIC ASSISTED PROCEDURE OF TRUNK REGION, PERCUTANEOUS ENDOSCOPIC APPROACH: ICD-10-PCS | Performed by: SURGERY

## 2022-04-13 PROCEDURE — 36415 COLL VENOUS BLD VENIPUNCTURE: CPT

## 2022-04-13 PROCEDURE — 97161 PT EVAL LOW COMPLEX 20 MIN: CPT | Mod: GP | Performed by: PHYSICAL THERAPIST

## 2022-04-13 PROCEDURE — 250N000011 HC RX IP 250 OP 636

## 2022-04-13 PROCEDURE — 85027 COMPLETE CBC AUTOMATED: CPT

## 2022-04-13 PROCEDURE — 80048 BASIC METABOLIC PNL TOTAL CA: CPT

## 2022-04-13 PROCEDURE — 0FD03ZX EXTRACTION OF LIVER, PERCUTANEOUS APPROACH, DIAGNOSTIC: ICD-10-PCS | Performed by: SURGERY

## 2022-04-13 PROCEDURE — 258N000003 HC RX IP 258 OP 636

## 2022-04-13 PROCEDURE — 97530 THERAPEUTIC ACTIVITIES: CPT | Mod: GP | Performed by: PHYSICAL THERAPIST

## 2022-04-13 PROCEDURE — 120N000002 HC R&B MED SURG/OB UMMC

## 2022-04-13 RX ORDER — PROCHLORPERAZINE MALEATE 5 MG
10 TABLET ORAL EVERY 6 HOURS PRN
Status: DISCONTINUED | OUTPATIENT
Start: 2022-04-13 | End: 2022-04-13

## 2022-04-13 RX ORDER — KETOROLAC TROMETHAMINE 15 MG/ML
15 INJECTION, SOLUTION INTRAMUSCULAR; INTRAVENOUS EVERY 8 HOURS PRN
Status: DISCONTINUED | OUTPATIENT
Start: 2022-04-13 | End: 2022-04-13

## 2022-04-13 RX ORDER — ACETAMINOPHEN 325 MG/1
975 TABLET ORAL EVERY 8 HOURS
Status: DISCONTINUED | OUTPATIENT
Start: 2022-04-13 | End: 2022-04-17 | Stop reason: HOSPADM

## 2022-04-13 RX ORDER — PROCHLORPERAZINE MALEATE 5 MG
5-10 TABLET ORAL EVERY 6 HOURS PRN
Status: DISCONTINUED | OUTPATIENT
Start: 2022-04-13 | End: 2022-04-17 | Stop reason: HOSPADM

## 2022-04-13 RX ORDER — KETOROLAC TROMETHAMINE 15 MG/ML
15 INJECTION, SOLUTION INTRAMUSCULAR; INTRAVENOUS EVERY 6 HOURS PRN
Status: DISCONTINUED | OUTPATIENT
Start: 2022-04-13 | End: 2022-04-14

## 2022-04-13 RX ORDER — PROCHLORPERAZINE 25 MG
25 SUPPOSITORY, RECTAL RECTAL EVERY 12 HOURS PRN
Status: DISCONTINUED | OUTPATIENT
Start: 2022-04-13 | End: 2022-04-13

## 2022-04-13 RX ADMIN — OXYCODONE HYDROCHLORIDE 5 MG: 5 TABLET ORAL at 09:52

## 2022-04-13 RX ADMIN — HYDROMORPHONE HYDROCHLORIDE 0.2 MG: 0.2 INJECTION, SOLUTION INTRAMUSCULAR; INTRAVENOUS; SUBCUTANEOUS at 10:59

## 2022-04-13 RX ADMIN — GABAPENTIN 100 MG: 100 CAPSULE ORAL at 20:19

## 2022-04-13 RX ADMIN — METHOCARBAMOL 500 MG: 500 TABLET ORAL at 04:47

## 2022-04-13 RX ADMIN — SODIUM CHLORIDE, POTASSIUM CHLORIDE, SODIUM LACTATE AND CALCIUM CHLORIDE: 600; 310; 30; 20 INJECTION, SOLUTION INTRAVENOUS at 03:21

## 2022-04-13 RX ADMIN — HYDROMORPHONE HYDROCHLORIDE 0.2 MG: 0.2 INJECTION, SOLUTION INTRAMUSCULAR; INTRAVENOUS; SUBCUTANEOUS at 13:58

## 2022-04-13 RX ADMIN — KETOROLAC TROMETHAMINE 15 MG: 15 INJECTION, SOLUTION INTRAMUSCULAR; INTRAVENOUS at 13:02

## 2022-04-13 RX ADMIN — GABAPENTIN 100 MG: 100 CAPSULE ORAL at 09:54

## 2022-04-13 RX ADMIN — GABAPENTIN 100 MG: 100 CAPSULE ORAL at 13:58

## 2022-04-13 RX ADMIN — OXYCODONE HYDROCHLORIDE 10 MG: 10 TABLET ORAL at 19:07

## 2022-04-13 RX ADMIN — PROCHLORPERAZINE MALEATE 10 MG: 5 TABLET ORAL at 12:35

## 2022-04-13 RX ADMIN — OXYCODONE HYDROCHLORIDE 5 MG: 5 TABLET ORAL at 03:25

## 2022-04-13 RX ADMIN — SODIUM CHLORIDE, POTASSIUM CHLORIDE, SODIUM LACTATE AND CALCIUM CHLORIDE: 600; 310; 30; 20 INJECTION, SOLUTION INTRAVENOUS at 16:44

## 2022-04-13 RX ADMIN — HYDROMORPHONE HYDROCHLORIDE 0.2 MG: 0.2 INJECTION, SOLUTION INTRAMUSCULAR; INTRAVENOUS; SUBCUTANEOUS at 15:58

## 2022-04-13 RX ADMIN — ONDANSETRON 4 MG: 2 INJECTION INTRAMUSCULAR; INTRAVENOUS at 08:00

## 2022-04-13 RX ADMIN — HYDROMORPHONE HYDROCHLORIDE 0.2 MG: 0.2 INJECTION, SOLUTION INTRAMUSCULAR; INTRAVENOUS; SUBCUTANEOUS at 08:06

## 2022-04-13 RX ADMIN — HYDROMORPHONE HYDROCHLORIDE 0.2 MG: 0.2 INJECTION, SOLUTION INTRAMUSCULAR; INTRAVENOUS; SUBCUTANEOUS at 20:19

## 2022-04-13 RX ADMIN — ENOXAPARIN SODIUM 40 MG: 40 INJECTION SUBCUTANEOUS at 09:58

## 2022-04-13 RX ADMIN — ACETAMINOPHEN 975 MG: 325 TABLET ORAL at 15:58

## 2022-04-13 RX ADMIN — PROCHLORPERAZINE MALEATE 10 MG: 5 TABLET ORAL at 20:29

## 2022-04-13 ASSESSMENT — ACTIVITIES OF DAILY LIVING (ADL)
ADLS_ACUITY_SCORE: 5
ADLS_ACUITY_SCORE: 3
ADLS_ACUITY_SCORE: 3
ADLS_ACUITY_SCORE: 5
ADLS_ACUITY_SCORE: 3
ADLS_ACUITY_SCORE: 5
ADLS_ACUITY_SCORE: 3
ADLS_ACUITY_SCORE: 5
ADLS_ACUITY_SCORE: 3
ADLS_ACUITY_SCORE: 5
ADLS_ACUITY_SCORE: 3
ADLS_ACUITY_SCORE: 5
ADLS_ACUITY_SCORE: 3
ADLS_ACUITY_SCORE: 3
ADLS_ACUITY_SCORE: 5
ADLS_ACUITY_SCORE: 3
ADLS_ACUITY_SCORE: 5
ADLS_ACUITY_SCORE: 3

## 2022-04-13 NOTE — PROGRESS NOTES
04/13/22 1042   Quick Adds   Type of Visit Initial PT Evaluation       Present no   Language English   Living Environment   People in Home parent(s)   Current Living Arrangements house   Home Accessibility stairs to enter home;stairs within home   Number of Stairs, Main Entrance 1   Number of Stairs, Within Home, Primary greater than 10 stairs   Stair Railings, Within Home, Primary railing on right side (ascending)   Transportation Anticipated family or friend will provide   Living Environment Comments Pt will be recovering at [facility], prior to surgery was a student at [employer] [facility] [employer].   Self-Care   Usual Activity Tolerance good   Current Activity Tolerance poor   Regular Exercise No   Equipment Currently Used at Home none   Fall history within last six months no   Activity/Exercise/Self-Care Comment Pt was independent with all ADLs at baseline.   General Information   Onset of Illness/Injury or Date of Surgery 04/12/22   Referring Physician Branden Eldridge MD   Patient/Family Therapy Goals Statement (PT) Pt would like to be able to walk and drive.   Pertinent History of Current Problem (include personal factors and/or comorbidities that impact the POC) Pt is a 22 y/o female s/p robotic right hemicolectomy, true cut liver biopsy x 2.   Existing Precautions/Restrictions abdominal  (abdominal binder for comfort)   General Observations Pt supine in bed at start of PT evaluation, parents present for PT evaluation.   Cognition   Affect/Mental Status (Cognition) WNL   Orientation Status (Cognition) other (see comments)  (Not tested)   Pain Assessment   Patient Currently in Pain Yes, see Vital Sign flowsheet  (reported 10/10 pain, did not increase with activity.)   Posture    Posture Not impaired   Range of Motion (ROM)   Range of Motion ROM deficits secondary to pain;ROM is WFL   Strength (Manual Muscle Testing)   Strength (Manual Muscle Testing) strength is WFL   Bed Mobility   Impairments  Contributing to Impaired Bed Mobility pain   Assistive Device (Bed Mobility) bed rails   Comment, (Bed Mobility) Pt completed rolling to right side with light min A x 1, use of bed rail.  Sidelying to/from sitting with HOB elevated, bed rail, and CGA x 1.  Scooting towards EOB with SBA x 1.   Transfers   Comment, (Transfers) Sit to/from standing from EOB with CGA x 1, pt used PT arms for support.   Gait/Stairs (Locomotion)   Comment, (Gait/Stairs) Not initiated.   Balance   Balance Comments Pt demonstrated good sitting balance at EOB and fair standing balance.   Sensory Examination   Sensory Perception patient reports no sensory changes   Clinical Impression   Criteria for Skilled Therapeutic Intervention Yes, treatment indicated   PT Diagnosis (PT) Impaired functional mobility   Influenced by the following impairments Post-op pain, weakness, and s/p robotic right hemicolectomy, true cut liver biopsy x 2.   Functional limitations due to impairments Impaired bed mobility, transfers, and gait.   Clinical Presentation (PT Evaluation Complexity) Stable/Uncomplicated   Clinical Presentation Rationale Pt is a 22 y/o female s/p  robotic right hemicolectomy, true cut liver biopsy x 2.  Pt does not have any PMH/co-morbidities that will impact PT POC.  Pt is independnet with all mobility/ADLs at baseline.   Clinical Decision Making (Complexity) low complexity   Planned Therapy Interventions (PT) bed mobility training;gait training;transfer training;stair training   Anticipated Equipment Needs at Discharge (PT) other (see comments)  (Will continue to assess equipment needs)   Risk & Benefits of therapy have been explained evaluation/treatment results reviewed;care plan/treatment goals reviewed;risks/benefits reviewed;current/potential barriers reviewed;patient;mother;father   PT Discharge Planning   PT Discharge Recommendation (DC Rec) home with assist   PT Rationale for DC Rec Pt mobility limited by pain/nausea this AM, but  anticipate tp will be able to discharge to home with parents for assist.   PT Brief overview of current status Pt completed all bed mobility, transfers, and a few side steps at EOB with assist of 1.   Plan of Care Review   Plan of Care Reviewed With patient;parent   Total Evaluation Time   Total Evaluation Time (Minutes) 9   Physical Therapy Goals   PT Frequency Daily   PT Predicated Duration/Target Date for Goal Attainment 04/20/22   PT Goals Bed Mobility;Gait;Stairs;Transfers   PT: Bed Mobility Independent;Supine to/from sit;Rolling;Within precautions   PT: Transfers Independent;Sit to/from stand;Assistive device;Within precautions   PT: Gait Independent;150 feet   PT: Stairs Supervision/stand-by assist;Greater than 10 stairs;Rail on right

## 2022-04-13 NOTE — PROGRESS NOTES
Colorectal Surgery Progress Note  [facility]  POD#1      Subjective:  Feeling sore overnight. No gas or BM. No vomiting. Did feel a bit nauseous after drinking water.     Vitals:  Vitals:    04/12/22 1735 04/12/22 1835 04/12/22 2221 04/13/22 0318   BP: 113/65 115/64 113/56 108/76   BP Location:   Right arm Right arm   Pulse: 102 81 112 92   Resp: 19 16 15 20   Temp:   99.6  F (37.6  C) 99.4  F (37.4  C)   TempSrc:   Temporal Temporal   SpO2: 97% 98% 97% 97%   Weight:       Height:         I/O:  I/O last 3 completed shifts:  In: 2230 [I.V.:1730; IV Piggyback:500]  Out: 390 [Urine:340; Blood:50]    Physical Exam:  Gen: AAOx3, NAD  Pulm: Non-labored breathing  Abd: Soft, non-distended, appropriately tender, no guarding.    Laparoscopic incisions clean, dry, and intact.   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 Campbell.     - Valdez out today  - robaxin, oxycodone, tylenol, dilaudid for pain  - follow up on path results (liver biopsy)  - clear liquid diet today  - Lactated ringers at 75 ml   - encourage ambulation     Ppx: enoxaparin  Dispo: 2-3 days pending return of bowel function      Vicki Gaytan  MS-4        Addendum:  Pt was seen and examined independent of the medical student.     S: +abdominal pain, all over, at times 10/10, somewhat dull in nature, crampy.  Does feel and hear gas rumbling throughout abdomen.    Currently denies N/V.  Denies burping/hiccuping.  Counseled to go slow  with diet if N/heartburn/burping/hiccuping.  Parents feel as though she is a little bit more like herself this morning and less sleepy.      O:  Vitals, I&Os reviewed    Sleepy, NAD.  But easily arouses.  Tracking.   Norm resp effort  abd soft, minimally distended. Mildly tender to palpation.   Incisions c/d/i with dermabond.  Mild faint erythema of the lower pfann.   Valdez in place    A/P:  Agree with note above.  Hgb 9.4 from 11.5.  Monitor.  Lovenox ppx while inpatient.  OK to start toradol prn for now.  Recheck labs in the morning.      Anca Membreno PA-C  Colon and Rectal Surgery     Seen and discussed with Fellow Dr. Ivey    The above plan of care was performed and communicated to me by Dr. Campbell.

## 2022-04-13 NOTE — OP NOTE
[facility] Colorectal Surgery Operative Report  April 13, 2022    PREOPERATIVE DIAGNOSIS:  1. Neuroendocrine tumor of appendix    POSTOPERATIVE DIAGNOSIS:   1. Neuroendocrine tumor of appendix    PROCEDURE:  1. Laparoscopic robotic-assisted right hemicolectomy.  2. Serafin-cut liver biopsy x2  3. ICG angiography    ANESTHESIA: General endotracheal anesthesia plus bilateral TAP blocks.    SURGEON:  Jacinto Campbell MD, PhD     ASSISTANT(S): Stephan Ivey MD, CRS Fellow    INDICATIONS FOR SURGERY: Na De León is a 20 yo lady who was found to have a well differentiated neuroendocrine tumor of the appendix after routine appendectomy.  There was a single lymph node in the specimen that harbored metastatic disease.  Preoperative imaging was otherwise negative for metastatic disease. I recommended elective right hemicolectomy after discussion at tumor board.    General risks related to abdominal surgery were reviewed with the patient. These include, but are not limited to, death, myocardial infarction, pneumonia, urinary tract infection, deep venous thrombosis with or without pulmonary embolus, abdominal infection from bowel injury or abscess, fistula, anastomotic leak that may require reoperation and a stoma, ureteral injury, urinary dysfunction, sexual dysfunction, bowel obstruction, wound infection, and bleeding.    OPERATIVE PROCEDURE: After obtaining informed consent, the patient was brought to the operating room and placed in the modified lithotomy position. The patient underwent preoperative bilateral TAP blocks. Appropriate preoperative mechanical and chemical deep venous thrombosis prophylaxis, as well as preoperative prophylactic parenteral antibiotics were given. General endotracheal anesthesia was gently induced. Bilateral lower extremity pneumatic compression devices were applied and all pressure points were cushioned. A Valdez catheter was inserted without difficulty.     The abdomen was then preped and  "draped in the standard sterile fashion. After a \"time-out\" was performed,  3 cm Pfannenstiel incision was made. The anterior fascia was opened with electrocautery. The muscle was bluntly dissected, and the peritoneum was then sharply opened as well. A small gelport was placed, and . The cap of the port was then placed, an 8 mm robotic port was placed through the gelport and the abdomen was insufflated without difficulty up to 15mmHg. A 10mm 30 degree angle robotic laparoscope was then used to explore the peritoneal cavity.     No evidence of bleeding, bowel injury or metastatic disease was visualized. There were two small areas of mild concern in the right and left lobes of the liver.  While they grossly appeared to be benign scar, given the history of the patient I decided to perform anne-marie-cut biopsy of each lesion.  We placed two addition 8 mm ports, a 12 mm port, and an 8 mm airseal port on the left side of the abdomen.  A anne-marie-cut biopsy was taken of the left lobe of liver lesion.  A 5 mm laparoscopic port was placed in the right upper quadrant.  A anne-marie-cut biopsy was taken of the right lobe of liver lesion.  These were then cauterized with the hook cautery.  Hemostasis was achieved.  The pathologist then called into the room to say that these appeared to be normal liver tissue and not metastatic neuroendocrine tumor.    The patient was positioned and the small bowel and omentum were then displaced towards left abdomen.     The [employer] Xi robot was docked and all robotic instruments were placed under direct vision. I then went to the robotic console to perform the operation.  We readily identified the ileocolic pedicle and skeletonized this.  We then took the pedicle with a single firing of the 60 mm robotic stapler with a white load.  We then proceeded in a medial to lateral dissection,  the mesentery of the colon from the duodenum and retroperitoneum.  We reached the hepatic flexure and took it down from " the medial side working lateral.  We then mobilized the proximal transverse colon by entering lesser sac, bringing her omentum off of the transverse colon mesentery.  Finally, we completed our dissection by taking the line of Told from the hepatic flexure and mobilizing as we moved proximally to the cecum.  Finally, we freed the terminal ileum from several attachments to retroperitoneum/pelvis.  During this whole process the ureter (right) was in view and this was avoided.  Of note, no residual disease was noted at the appendectomy site.  The staple line was clean and without abnormality.    After obtaining adequate mobilization we took the mesentery with the vessel sealer, working from ileocolic pedicle to our intended proximal transection site on the small bowel about 10 cm from IC valve.  After this, we took the proximal transection with a single firing of the robotic stapler with a blue load.  We then took the mesentery with the vessel sealer, including a probable right colic artery, until we reached our intended distal transection site.  ICG angiography was performed and confirmed that the colon distal to our distal transection site was well perfused.  We performed the distal transection with two firings of the robotic stapler with a blue load.  After this the right colon was placed in the right upper quadrant.    The distal small bowel and transverse colon were arranged in an isoperistaltic fashion, making sure to not twist the mesentery of the small bowel, and we created our anastomosis.  We did this by creating a paired colotomy/enterotomy and then inserting an arm of the robotic stapler (60 mm) in both the small and large intestine.  We created the anastomosis with a single firing.  It looked great and there was minimal, if any, bleeding from the staple line.  We then closed the common enterotomy in two layers with a six inch 3-0 V-lock suture and then 3-0 vicryl suture in Lembert fashion.  We made sure to  close the lower aspect of the common enterotomy.    The abdomen was irrigated and hemostasis was corroborated.  The 12 mm robotic port was closed with 0 vicryl in figure of 8 fashion with the ashly-grant device.  The right colon was placed in a 15 cm endocatch bag.  The robotic ports were removed under direct vision and the right colon specimen was removed through the Pfannenstiel incision.  The abdomen was desufflated.    The peritoneum at the Pfannenstiel incision was closed with vicryl.  The fascia was closed with #1 PDS in running fashion. The 8 mm air seal assistant port was closed with 0 vicryl.      Instrument, sponge, and needle counts were all correct, as reported to me at this time. Wounds were irrigated and dried, and hemostasis was corroborated. Skin incisions were re-approximated with running subcuticular 4-0 Monocryl sutures and exofilm. The patient tolerated the procedure well.    COMPLICATIONS: none.    ESTIMATED BLOOD LOSS: 50 mL.    SPECIMEN(S): right colon and terminal ileum, left lobe of liver lesion anne-marie-cut biopsy, right lobe of liver lesion anne-marie-cut biopsy    OPERATIVE COUNT: Complete.    OPERATIVE FINDINGS:   1. Stapled isoperistaltic ileocolic anastomosis  2. Liver lesion biopsies negative for metastasis  3. Otherwise normal anatomy    Jacinto Campbell MD, PhD   Division of Colon and Rectal Surgery  [facility]      CC:  [facility] Surgery billing.

## 2022-04-13 NOTE — PROGRESS NOTES
Admitted/transferred from: PACU   2 RN full   skin assessment completed by Brandie Friedman, SCAR and [employer].  Skin assessment finding: issues found 6 lap sites, IGNACIO canseco   Interventions/actions: skin interventions Continue with plan of care     Will continue to monitor.

## 2022-04-13 NOTE — PLAN OF CARE
Time: 2116-1271    Reason for Admission: Hemicolectomy, liver biopsy for neuroendocrine cancer of the appendix     Activity: Dangled and stool at the bedside with assistance of 2    Neuro: lethargic. Wakes to gentle stimulation and voice     GI/: Adequate urine output in canseco. Canseco extremely positional and needs to be manipulated for drainage. No flatus or BM. Hypoactive bowel sounds     Diet: Clears     Incisions/Drains: 6 Lap sites, canseco     IV Access: PIV x2    Labs: CBC, BMP and platlet count this AM    Vitals: AVSS, on RA. Capnography remains on     Pain: Patient is lethargic, but when awoken states she is in 10/10 pain. Patient did become apneic on previous shift due to oversedation so Pt and family were educated on other modalities available in order to use narcotics sparingly. Robaxin, gabapentin, tylenol, oxycodone and dilaudid were utilized for pain management, as well as repositioning and ice packs.     New changes this shift: Skin assessment complete. Patient dangles and stood at the bedside. Did become slightly dizzy. Zofran given x1 for nausea.     Plan: Continue with plan of care.

## 2022-04-13 NOTE — PLAN OF CARE
"[id number]    Blood pressure 109/71, pulse 83, temperature 97.8  F (36.6  C), temperature source Temporal, resp. rate 17, height 1.6 m (5' 3\"), weight 52.7 kg (116 lb 2.9 oz), last menstrual period 03/29/2022, SpO2 97 %, not currently breastfeeding.    VSS on RA. AO x4. Compazine x1 for nausea. Abdominal pain managed w/ PRN IV Dilaudid, PRN oxycodone, and PRN Toradol. Pain level 7-9/10. Lap sites WNL, open to air. Valdez removed, tolerated well. Tolerating well on clear liquid diet. Worked w/ PT, took a few steps. Ambulated to the bathroom, c/o mild dizziness when up. Commode at bedside. Initial IS teaching done, appropriately using. Parents at bedside.    Continue w/ POC.    "

## 2022-04-14 ENCOUNTER — APPOINTMENT (OUTPATIENT)
Dept: PHYSICAL THERAPY | Facility: CLINIC | Age: 22
DRG: 827 | End: 2022-04-14
Attending: SURGERY
Payer: COMMERCIAL

## 2022-04-14 LAB
ANION GAP SERPL CALCULATED.3IONS-SCNC: 8 MMOL/L (ref 3–14)
BUN SERPL-MCNC: 8 MG/DL (ref 7–30)
CALCIUM SERPL-MCNC: 8.8 MG/DL (ref 8.5–10.1)
CHLORIDE BLD-SCNC: 109 MMOL/L (ref 94–109)
CO2 SERPL-SCNC: 22 MMOL/L (ref 20–32)
CREAT SERPL-MCNC: 0.67 MG/DL (ref 0.52–1.04)
ERYTHROCYTE [DISTWIDTH] IN BLOOD BY AUTOMATED COUNT: 12.6 % (ref 10–15)
GFR SERPL CREATININE-BSD FRML MDRD: >90 ML/MIN/1.73M2
GLUCOSE BLD-MCNC: 59 MG/DL (ref 70–99)
GLUCOSE BLDC GLUCOMTR-MCNC: 98 MG/DL (ref 70–99)
HCT VFR BLD AUTO: 25.4 % (ref 35–47)
HGB BLD-MCNC: 8 G/DL (ref 11.7–15.7)
MCH RBC QN AUTO: 29.4 PG (ref 26.5–33)
MCHC RBC AUTO-ENTMCNC: 31.5 G/DL (ref 31.5–36.5)
MCV RBC AUTO: 93 FL (ref 78–100)
PLATELET # BLD AUTO: 161 10E3/UL (ref 150–450)
POTASSIUM BLD-SCNC: 4.1 MMOL/L (ref 3.4–5.3)
RBC # BLD AUTO: 2.72 10E6/UL (ref 3.8–5.2)
SODIUM SERPL-SCNC: 139 MMOL/L (ref 133–144)
WBC # BLD AUTO: 4.8 10E3/UL (ref 4–11)

## 2022-04-14 PROCEDURE — 82310 ASSAY OF CALCIUM: CPT

## 2022-04-14 PROCEDURE — 250N000013 HC RX MED GY IP 250 OP 250 PS 637: Performed by: PHYSICIAN ASSISTANT

## 2022-04-14 PROCEDURE — 36415 COLL VENOUS BLD VENIPUNCTURE: CPT

## 2022-04-14 PROCEDURE — 250N000011 HC RX IP 250 OP 636: Performed by: PHYSICIAN ASSISTANT

## 2022-04-14 PROCEDURE — 97530 THERAPEUTIC ACTIVITIES: CPT | Mod: GP

## 2022-04-14 PROCEDURE — 97116 GAIT TRAINING THERAPY: CPT | Mod: GP

## 2022-04-14 PROCEDURE — 250N000011 HC RX IP 250 OP 636

## 2022-04-14 PROCEDURE — 258N000003 HC RX IP 258 OP 636

## 2022-04-14 PROCEDURE — 120N000002 HC R&B MED SURG/OB UMMC

## 2022-04-14 PROCEDURE — 85027 COMPLETE CBC AUTOMATED: CPT

## 2022-04-14 PROCEDURE — 250N000013 HC RX MED GY IP 250 OP 250 PS 637

## 2022-04-14 RX ADMIN — HYDROMORPHONE HYDROCHLORIDE 0.2 MG: 0.2 INJECTION, SOLUTION INTRAMUSCULAR; INTRAVENOUS; SUBCUTANEOUS at 00:07

## 2022-04-14 RX ADMIN — PROCHLORPERAZINE MALEATE 10 MG: 5 TABLET ORAL at 19:20

## 2022-04-14 RX ADMIN — KETOROLAC TROMETHAMINE 15 MG: 15 INJECTION, SOLUTION INTRAMUSCULAR; INTRAVENOUS at 12:11

## 2022-04-14 RX ADMIN — ONDANSETRON 4 MG: 2 INJECTION INTRAMUSCULAR; INTRAVENOUS at 00:03

## 2022-04-14 RX ADMIN — OXYCODONE HYDROCHLORIDE 5 MG: 5 TABLET ORAL at 18:00

## 2022-04-14 RX ADMIN — ENOXAPARIN SODIUM 40 MG: 40 INJECTION SUBCUTANEOUS at 09:00

## 2022-04-14 RX ADMIN — HYDROMORPHONE HYDROCHLORIDE 0.2 MG: 0.2 INJECTION, SOLUTION INTRAMUSCULAR; INTRAVENOUS; SUBCUTANEOUS at 11:48

## 2022-04-14 RX ADMIN — ONDANSETRON 4 MG: 4 TABLET, ORALLY DISINTEGRATING ORAL at 08:58

## 2022-04-14 RX ADMIN — GABAPENTIN 100 MG: 100 CAPSULE ORAL at 08:58

## 2022-04-14 RX ADMIN — OXYCODONE HYDROCHLORIDE 10 MG: 10 TABLET ORAL at 13:53

## 2022-04-14 RX ADMIN — ACETAMINOPHEN 975 MG: 325 TABLET ORAL at 16:02

## 2022-04-14 RX ADMIN — SODIUM CHLORIDE, POTASSIUM CHLORIDE, SODIUM LACTATE AND CALCIUM CHLORIDE: 600; 310; 30; 20 INJECTION, SOLUTION INTRAVENOUS at 05:36

## 2022-04-14 RX ADMIN — ACETAMINOPHEN 975 MG: 325 TABLET ORAL at 00:07

## 2022-04-14 RX ADMIN — OXYCODONE HYDROCHLORIDE 10 MG: 10 TABLET ORAL at 02:21

## 2022-04-14 RX ADMIN — ONDANSETRON 4 MG: 4 TABLET, ORALLY DISINTEGRATING ORAL at 18:05

## 2022-04-14 RX ADMIN — METHOCARBAMOL 500 MG: 500 TABLET ORAL at 22:33

## 2022-04-14 RX ADMIN — ACETAMINOPHEN 975 MG: 325 TABLET ORAL at 08:58

## 2022-04-14 RX ADMIN — HYDROMORPHONE HYDROCHLORIDE 0.2 MG: 0.2 INJECTION, SOLUTION INTRAMUSCULAR; INTRAVENOUS; SUBCUTANEOUS at 21:52

## 2022-04-14 RX ADMIN — HYDROMORPHONE HYDROCHLORIDE 0.2 MG: 0.2 INJECTION, SOLUTION INTRAMUSCULAR; INTRAVENOUS; SUBCUTANEOUS at 08:58

## 2022-04-14 RX ADMIN — KETOROLAC TROMETHAMINE 15 MG: 15 INJECTION, SOLUTION INTRAMUSCULAR; INTRAVENOUS at 05:55

## 2022-04-14 RX ADMIN — PROCHLORPERAZINE MALEATE 10 MG: 5 TABLET ORAL at 11:11

## 2022-04-14 RX ADMIN — GABAPENTIN 100 MG: 100 CAPSULE ORAL at 13:54

## 2022-04-14 RX ADMIN — GABAPENTIN 100 MG: 100 CAPSULE ORAL at 19:19

## 2022-04-14 ASSESSMENT — ACTIVITIES OF DAILY LIVING (ADL)
ADLS_ACUITY_SCORE: 5

## 2022-04-14 NOTE — PLAN OF CARE
Assumed care for pt [id number]  VSS on RA, nausea managed with prn Zofran, pain managed with scheduled tylenol, prn dilaudid, toradol and oxycodone. Up with SBA, voiding spontaneously. Passing gas, no BM this shift. lap sites x6 with liquid bandage. LPIV infusing IVMF. Pt sleeping between cares, needs met, will continue plan of care.

## 2022-04-14 NOTE — PROGRESS NOTES
Colorectal Surgery Progress Note  [facility]  POD#2      Subjective:  Still in pain, managed with ketorolac, oxycodone, dilaudid and tylenol. Thinks she passed some gas, no bowel movements. Tolerating clear liquid diet. Nausea after taking pain medications. Has not vomited.     Vitals:  Vitals:    04/13/22 1545 04/13/22 1729 04/13/22 2153 04/14/22 0052   BP: 106/59   104/54   BP Location: Right arm   Right arm   Pulse: 78  98 94   Resp: 17 14 17   Temp: 98  F (36.7  C)   98.4  F (36.9  C)   TempSrc: Temporal   Oral   SpO2: 98% 100% 94% 97%   Weight:       Height:         I/O:  I/O last 3 completed shifts:  In: 1665.75 [P.O.:240; I.V.:1425.75]  Out: 2450 [Urine:2450]    Physical Exam:  Gen:      AAOx3, NAD  Pulm:    Non-labored breathing  Abd:      Soft, non-distended, appropriately tender, no guarding.                Laparoscopic incisions and lower incision clean, dry, and intact.   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 Campbell.     Hemoglobin was 9.4 yesterday, down from 11.5 preop. No significant blood loss during surgery and currently hemodynamically stable. Etiology unclear, will continue to monitor.     - AM labs pending   - robaxin, oxycodone, tylenol, dilaudid for pain  - full liquid diet, eat before taking oxycodone  - Stop IV fluids  - follow up on path results (liver biopsy)  - encourage  ambulation      Ppx: enoxaparin  Dispo: 2-3 days pending return of bowel function      Vicki Gaytan  MS-4    Addendum:  Pt was seen and examined independent of the medical student.     S: much more alert today.  Pain is doing ok with current regimen.  Does feel bloated.  Passed a little gas.  No stool.  Did get dizzy walking today.  Attempted some pudding but that made her nauseated so she stopped.  Is tolerating water.  Trying to drink some apple juice.  Urinating fine since canseco removal.     O:  Vitals, I&Os reviewed  NAD, was sleeping but woke up easily and alert.  Steady with getting up and ambulating.   Norm resp effort  abd soft, mildly distended for her.  Incisions are c/d/i.   With therapies, laying BP was 106/62 HR 89.  Standing BP was 92/60 .  (Clinic BPs are 90-100s/50-60s)    A/P:    Agree with note above.   Hgb is 8 today from 9.4 that is from 11 likely multifactorial due to dilution from post-op IVFs and acute blood loss anemia from surgery  - hold lovenox.  Will discuss toradol with team.   - trend hgb.  Per pt and parents, ok with blood transfusion as needed  - diet - go slow as tolerated and if nauseated then stop as she did  - if not able to drink much, may need to restart IVFs with some dextrose.   Ppx:  Holding lovenox.    Dispo:  When return of bowel function and stable vitals/labs.  No lovenox ppx on discharge.     Anca Membreno PA-C  Colon and Rectal Surgery     Seen and discussed with Fellow Dr. Ivey    The above plan of care was performed and communicated to me by Dr. Campbell.

## 2022-04-14 NOTE — PLAN OF CARE
Assumed nursing care from [id number]. Patient alert and calm; affect flat. Up w/ SBA and ambulatory to bathroom. Vital signs stable; capno monitoring on. Abdominal lap site incisions closed w/ liquid bandage; no signs or symptoms of infection noted. Bowel sounds normoactive; passing flatus; no BM. Continues on clear liquid diet. Voiding spontaneously w/ adequate output. C/O constant generalized abdominal pain; PRN Toradol, Dilaudid, and oxycodone moderately effective; also uses cold packs. SCDs on when in bed. PRN Compazine given x1 for nausea; effective. (L) wrist PIV infusing LR at 75 mL/hr.

## 2022-04-14 NOTE — PLAN OF CARE
"[id number]    Blood pressure 94/51, pulse 91, temperature 97.4  F (36.3  C), temperature source Oral, resp. rate 18, height 1.6 m (5' 3\"), weight 52.7 kg (116 lb 2.9 oz), last menstrual period 03/29/2022, SpO2 99 %, not currently breastfeeding.    Soft BP, OVSS. Dizziness when up, encouraged fluids. Zofran x1, Compazine x1 for nausea. Abdominal pain level 7-8/10, PRN IV Dilaudid x2, PRN PO oxycodone x1 and PRN Toradol x1. Toradol discontinued d/t low Hgb 8.0. Lap sites WNL, open to air. Tolerating full liquid diet, poor appetite. Passing gas, no BM. Parents at bedside.     Continue w/ POC.    "

## 2022-04-15 ENCOUNTER — APPOINTMENT (OUTPATIENT)
Dept: PHYSICAL THERAPY | Facility: CLINIC | Age: 22
DRG: 827 | End: 2022-04-15
Attending: SURGERY
Payer: COMMERCIAL

## 2022-04-15 LAB
ANION GAP SERPL CALCULATED.3IONS-SCNC: 10 MMOL/L (ref 3–14)
BUN SERPL-MCNC: 3 MG/DL (ref 7–30)
CALCIUM SERPL-MCNC: 8.5 MG/DL (ref 8.5–10.1)
CHLORIDE BLD-SCNC: 109 MMOL/L (ref 94–109)
CO2 SERPL-SCNC: 22 MMOL/L (ref 20–32)
CREAT SERPL-MCNC: 0.67 MG/DL (ref 0.52–1.04)
ERYTHROCYTE [DISTWIDTH] IN BLOOD BY AUTOMATED COUNT: 12.7 % (ref 10–15)
GFR SERPL CREATININE-BSD FRML MDRD: >90 ML/MIN/1.73M2
GLUCOSE BLD-MCNC: 74 MG/DL (ref 70–99)
GLUCOSE BLDC GLUCOMTR-MCNC: 211 MG/DL (ref 70–99)
GLUCOSE BLDC GLUCOMTR-MCNC: 68 MG/DL (ref 70–99)
GLUCOSE BLDC GLUCOMTR-MCNC: 75 MG/DL (ref 70–99)
HCT VFR BLD AUTO: 24.8 % (ref 35–47)
HGB BLD-MCNC: 7.9 G/DL (ref 11.7–15.7)
MCH RBC QN AUTO: 28.7 PG (ref 26.5–33)
MCHC RBC AUTO-ENTMCNC: 31.9 G/DL (ref 31.5–36.5)
MCV RBC AUTO: 90 FL (ref 78–100)
PLATELET # BLD AUTO: 176 10E3/UL (ref 150–450)
POTASSIUM BLD-SCNC: 3.4 MMOL/L (ref 3.4–5.3)
RBC # BLD AUTO: 2.75 10E6/UL (ref 3.8–5.2)
SODIUM SERPL-SCNC: 141 MMOL/L (ref 133–144)
WBC # BLD AUTO: 3.3 10E3/UL (ref 4–11)

## 2022-04-15 PROCEDURE — 999N000128 HC STATISTIC PERIPHERAL IV START W/O US GUIDANCE

## 2022-04-15 PROCEDURE — 250N000013 HC RX MED GY IP 250 OP 250 PS 637

## 2022-04-15 PROCEDURE — 250N000011 HC RX IP 250 OP 636

## 2022-04-15 PROCEDURE — 97530 THERAPEUTIC ACTIVITIES: CPT | Mod: GP

## 2022-04-15 PROCEDURE — 85018 HEMOGLOBIN: CPT

## 2022-04-15 PROCEDURE — 97116 GAIT TRAINING THERAPY: CPT | Mod: GP

## 2022-04-15 PROCEDURE — 80048 BASIC METABOLIC PNL TOTAL CA: CPT

## 2022-04-15 PROCEDURE — 250N000013 HC RX MED GY IP 250 OP 250 PS 637: Performed by: PHYSICIAN ASSISTANT

## 2022-04-15 PROCEDURE — 120N000002 HC R&B MED SURG/OB UMMC

## 2022-04-15 PROCEDURE — 258N000001 HC RX 258: Performed by: SURGERY

## 2022-04-15 PROCEDURE — 36415 COLL VENOUS BLD VENIPUNCTURE: CPT

## 2022-04-15 RX ORDER — DEXTROSE MONOHYDRATE 25 G/50ML
25-50 INJECTION, SOLUTION INTRAVENOUS
Status: DISCONTINUED | OUTPATIENT
Start: 2022-04-15 | End: 2022-04-15

## 2022-04-15 RX ORDER — NICOTINE POLACRILEX 4 MG
15-30 LOZENGE BUCCAL
Status: DISCONTINUED | OUTPATIENT
Start: 2022-04-15 | End: 2022-04-17 | Stop reason: HOSPADM

## 2022-04-15 RX ORDER — OXYCODONE HYDROCHLORIDE 5 MG/1
5-10 TABLET ORAL EVERY 6 HOURS PRN
Qty: 18 TABLET | Refills: 0 | Status: SHIPPED | OUTPATIENT
Start: 2022-04-15 | End: 2022-04-17

## 2022-04-15 RX ORDER — DEXTROSE MONOHYDRATE 25 G/50ML
25-50 INJECTION, SOLUTION INTRAVENOUS
Status: DISCONTINUED | OUTPATIENT
Start: 2022-04-15 | End: 2022-04-17 | Stop reason: HOSPADM

## 2022-04-15 RX ADMIN — GABAPENTIN 100 MG: 100 CAPSULE ORAL at 14:15

## 2022-04-15 RX ADMIN — DEXTROSE MONOHYDRATE 50 ML: 25 INJECTION, SOLUTION INTRAVENOUS at 03:36

## 2022-04-15 RX ADMIN — PROCHLORPERAZINE MALEATE 10 MG: 5 TABLET ORAL at 17:21

## 2022-04-15 RX ADMIN — OXYCODONE HYDROCHLORIDE 5 MG: 5 TABLET ORAL at 08:06

## 2022-04-15 RX ADMIN — METHOCARBAMOL 500 MG: 500 TABLET ORAL at 08:06

## 2022-04-15 RX ADMIN — HYDROMORPHONE HYDROCHLORIDE 0.2 MG: 0.2 INJECTION, SOLUTION INTRAMUSCULAR; INTRAVENOUS; SUBCUTANEOUS at 09:00

## 2022-04-15 RX ADMIN — OXYCODONE HYDROCHLORIDE 5 MG: 5 TABLET ORAL at 20:46

## 2022-04-15 RX ADMIN — ACETAMINOPHEN 975 MG: 325 TABLET ORAL at 00:25

## 2022-04-15 RX ADMIN — METHOCARBAMOL 500 MG: 500 TABLET ORAL at 20:50

## 2022-04-15 RX ADMIN — GABAPENTIN 100 MG: 100 CAPSULE ORAL at 08:06

## 2022-04-15 RX ADMIN — OXYCODONE HYDROCHLORIDE 10 MG: 10 TABLET ORAL at 00:33

## 2022-04-15 RX ADMIN — OXYCODONE HYDROCHLORIDE 10 MG: 10 TABLET ORAL at 11:48

## 2022-04-15 RX ADMIN — GABAPENTIN 100 MG: 100 CAPSULE ORAL at 20:46

## 2022-04-15 RX ADMIN — ACETAMINOPHEN 975 MG: 325 TABLET ORAL at 16:01

## 2022-04-15 RX ADMIN — ACETAMINOPHEN 975 MG: 325 TABLET ORAL at 08:05

## 2022-04-15 RX ADMIN — OXYCODONE HYDROCHLORIDE 10 MG: 10 TABLET ORAL at 16:01

## 2022-04-15 ASSESSMENT — ACTIVITIES OF DAILY LIVING (ADL)
ADLS_ACUITY_SCORE: 5

## 2022-04-15 NOTE — PLAN OF CARE
Assumed care for pt [id number]  Soft BP's, OAVSS. Denies nausea, pain managed with prn tylenol and oxycodone. Spot blood sugar check overnight 68, pt unable to drink apple juice because it upset her stomach, MD made aware, ordered d50. Blood sugar recheck 110. Up with SBA, voiding spontaneously not saving, passing gas, no BM this shift. lap sites with liquid bandage. PIV SL. Needs met, will continue plan of care.

## 2022-04-15 NOTE — PLAN OF CARE
"Goal Outcome Evaluation:    Plan of Care Reviewed With: patient     Overall Patient Progress: improving    Reports good pain control with po oxycodone and one dose IV dilaudid today. Mild nausea improved with compazine, but felt oxycodone 10 mg may have caused some lightheadedness. Suggest we try the 5 mg again next dose. Tolerating full liquids with fair appetite. PIV saline locked. Lap sites x5 intact with dermabond. Reports passing gas. No BM. Expecting some bloody output with first few BM per surgical team. Ambulated halls with minimal standby assist, encouraged to increase independence. Vitals stable. /74   Pulse 98   Temp 98.8  F (37.1  C) (Oral)   Resp 16   Ht 1.6 m (5' 3\")   Wt 52.7 kg (116 lb 2.9 oz)   LMP 03/29/2022   SpO2 99%   BMI 20.58 kg/m               "

## 2022-04-15 NOTE — PROGRESS NOTES
Colorectal Surgery Progress Note  [facility]  POD#3      Subjective:  Feeling well. Pain controlled with oxycodone, tylenol, gabapentin. Had some nausea with pudding yesterday, no vomiting. Passing gas, has not had a bowel movement.     Vitals:  Vitals:    04/14/22 0626 04/14/22 1523 04/14/22 2224 04/15/22 0543   BP: 94/51 108/62 113/62 106/58   BP Location: Right arm Right arm Right arm Right arm   Pulse: 91 101 89 89   Resp: 18 17 20 18   Temp: 97.4  F (36.3  C) (!) 96.4  F (35.8  C) 98.9  F (37.2  C) 98.8  F (37.1  C)   TempSrc: Oral Oral Oral Oral   SpO2: 99% 100% 98% 97%   Weight:       Height:         I/O:  I/O last 3 completed shifts:  In: 420 [P.O.:420]  Out: -     Physical Exam:  Gen:      AAOx3, NAD  Pulm:    Non-labored breathing  Abd:      Soft, not distended, appropriately tender, no guarding.                Laparoscopic incisions and lower incision clean, dry, and intact.   Ext:       Warm and well-perfused    BMP  Recent Labs   Lab 04/15/22  0359 04/15/22  0315 04/15/22  0240 04/14/22  1609 04/14/22  0821 04/13/22  0711 04/12/22  1609 04/12/22  0612 04/08/22  1031   NA  --   --   --   --  139 139  --   --  141   POTASSIUM  --   --   --   --  4.1 3.9  --   --  4.0   CHLORIDE  --   --   --   --  109 109  --   --  107   CO2  --   --   --   --  22 26  --   --  24   BUN  --   --   --   --  8 6*  --   --  13   CR  --   --   --   --  0.67 0.78 0.86  --  0.76   * 75 68* 98 59* 88  --    < > 69*    < > = values in this interval not displayed.     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 Campbell.      Hemoglobin dropped  again, 7.9 from 8. No significant blood loss during surgery and currently hemodynamically stable. May have had small amount of bleeding around stable line, would expect a bloody bowel movement. Discussed this with the patient.      - AM labs pending   - consider restarting IVF given low PO intake  - robaxin, oxycodone, tylenol, dilaudid for pain. No Toradol given hemoglobin  - full liquid diet, eat before taking oxycodone  - follow up on path results (liver biopsy)  - encourage ambulation      Ppx: hold enoxaparin given hemoglobin  Dispo: 2-3 days pending return of bowel function        Vicki Gaytan  MS-4        Addendum:  Pt was seen and examined independent of the medical student.     S: doing well.  Pain relatively controlled.  Tolerating water.  Has not tried too much more as she had some nausea yesterday.  Passed some gas, no stool.  Did discuss that with the first few BMs, pt may pass blood in those first few stools and that is to be expected.     O:  Vitals, I&Os reviewed  NAD  Norm resp effort  abd soft, less distended today  Incisions c/d/i    A/P:  Agree with note above.  Hgb 7.9 from 8 yesterday. Will discuss lovenox and or toradol.     Anca Membreno PA-C  Colon and Rectal Surgery     Seen and discussed with Dr. Ivey    The above plan of care was performed and communicated to me by Dr. Campbell.

## 2022-04-15 NOTE — DISCHARGE SUMMARY
[facility]  Discharge Summary  Colon and Rectal Surgery     Na De León MRN# [id number]   YOB: 2000 Age: 21 year old     Date of Admission:  4/12/2022  Date of Discharge::  4/17/2022  Admitting Physician:  Jacinto Campbell MD  Discharge Physician:  Jacinto Campbell MD  Primary Care Physician:        Emma Borrero          Admission Diagnoses:   Neuroendocrine neoplasm of appendix [D3A.8]  S/p prior appendectomy            Discharge Diagnosis:   Neuroendocrine tumor of the appendix  S/p prior appendectomy  Acute blood loss anemia         Procedures:   4/12/2022:  PROCEDURE:  1. Laparoscopic robotic-assisted right hemicolectomy.  2. Serafin-cut liver biopsy x2  3. ICG angiography     ANESTHESIA: General endotracheal anesthesia plus bilateral TAP blocks.          Consultations:   PHYSICAL THERAPY ADULT IP CONSULT  VASCULAR ACCESS CARE ADULT IP CONSULT         Imaging Studies:     Results for orders placed or performed during the hospital encounter of 04/12/22   POC US Guidance Needle Placement    Narrative    BL TAP block           Medications Prior to Admission:     Medications Prior to Admission   Medication Sig Dispense Refill Last Dose     buPROPion (WELLBUTRIN XL) 150 MG 24 hr tablet [BUPROPION (WELLBUTRIN XL) 150 MG 24 HR TABLET] Take 1 tablet (150 mg total) by mouth daily. (Patient taking differently: Take 150 mg by mouth every morning) 90 tablet 3 Past Week at Unknown time     butalbital-acetaminophen-caffeine (ESGIC) -40 MG tablet Take 1 tablet by mouth every 4 hours as needed for headaches 60 tablet 1 Past Week at Unknown time     spironolactone (ALDACTONE) 50 MG tablet    More than a month at Unknown time     Sulfacetamide Sodium-Sulfur 10-5 % LIQD APPLY TO FACE AND BACK ONCE DAILY   Unknown at Unknown time     Sulfacetamide Sodium-Sulfur 10-5 % SUSP    Unknown at Unknown time     tretinoin (RETIN-A) 0.025 %  "external cream         [DISCONTINUED] cefuroxime (CEFTIN) 500 MG tablet TAKE 1 TABLET BY MOUTH TWICE A DAY   Unknown at Unknown time     [DISCONTINUED] cephALEXin (KEFLEX) 500 MG capsule TAKE 1 CAPSULE BY MOUTH EVERY 12 HOURS FOR 7 DAYS   Unknown at Unknown time     [DISCONTINUED] etonogestrel (NEXPLANON) 68 mg Impl implant 1 each by Subdermal route once    Unknown at Unknown time     [DISCONTINUED] metroNIDAZOLE (FLAGYL) 500 MG tablet Take 1 tablet (500 mg) by mouth every 6 hours At 8:00 am, 2:00 pm, 8:00 pm the day prior to your surgery with neomycin and zofran. 3 tablet 0 4/11/2022 at 2200     [DISCONTINUED] neomycin (MYCIFRADIN) 500 MG tablet Take 2 tablets (1,000 mg) by mouth every 6 hours At 8:00 am, 2:00 pm, 8:00 pm the day prior to your surgery with flagyl and zofran. 6 tablet 0 4/11/2022 at 2200     [DISCONTINUED] ondansetron (ZOFRAN) 4 MG tablet Take 1 tablet (4 mg) by mouth every 6 hours At 8:00 am, 2:00 pm, 8:00 pm the day prior to your surgery with neomycin and flagyl. 3 tablet 0 4/11/2022 at 1200     [DISCONTINUED] polyethylene glycol (MIRALAX) 17 g packet Take 238 g by mouth See Admin Instructions Starting at 4 pm night prior to surgery. Refer to \"Getting Ready for Surgery\" instructions. 14 packet 0 4/11/2022 at 1200     [DISCONTINUED] traMADol (ULTRAM) 50 MG tablet TAKE 1 TABLET (50 MG) BY MOUTH EVERY 6 HOURS AS NEEDED FOR SEVERE PAIN FOR UP TO 3 DAYS   More than a month at Unknown time          Discharge Medications:     Current Discharge Medication List      START taking these medications    Details   acetaminophen (TYLENOL) 500 MG tablet Take 1 tablet (500 mg) by mouth every 6 hours  Qty: 90 tablet, Refills: 0    Associated Diagnoses: S/P right hemicolectomy      ibuprofen (ADVIL/MOTRIN) 400 MG tablet Take 1 tablet (400 mg) by mouth every 6 hours  Qty: 90 tablet, Refills: 0    Associated Diagnoses: S/P right hemicolectomy      methocarbamol (ROBAXIN) 500 MG tablet Take 1 tablet (500 mg) by mouth 4 " times daily as needed for muscle spasms  Qty: 20 tablet, Refills: 0    Associated Diagnoses: S/P right hemicolectomy      oxyCODONE (ROXICODONE) 5 MG tablet Take 1 tablet (5 mg) by mouth every 6 hours as needed for moderate to severe pain  Qty: 20 tablet, Refills: 0    Associated Diagnoses: S/P right hemicolectomy; Acute post-operative pain         CONTINUE these medications which have NOT CHANGED    Details   buPROPion (WELLBUTRIN XL) 150 MG 24 hr tablet [BUPROPION (WELLBUTRIN XL) 150 MG 24 HR TABLET] Take 1 tablet (150 mg total) by mouth daily.  Qty: 90 tablet, Refills: 3    Associated Diagnoses: Anxiety; Symptoms of depression      butalbital-acetaminophen-caffeine (ESGIC) -40 MG tablet Take 1 tablet by mouth every 4 hours as needed for headaches  Qty: 60 tablet, Refills: 1    Associated Diagnoses: Tension-type headache, not intractable, unspecified chronicity pattern      spironolactone (ALDACTONE) 50 MG tablet       Sulfacetamide Sodium-Sulfur 10-5 % LIQD APPLY TO FACE AND BACK ONCE DAILY      Sulfacetamide Sodium-Sulfur 10-5 % SUSP       tretinoin (RETIN-A) 0.025 % external cream          STOP taking these medications       cefuroxime (CEFTIN) 500 MG tablet Comments:   Reason for Stopping:         cephALEXin (KEFLEX) 500 MG capsule Comments:   Reason for Stopping:         etonogestrel (NEXPLANON) 68 mg Impl implant Comments:   Reason for Stopping:         metroNIDAZOLE (FLAGYL) 500 MG tablet Comments:   Reason for Stopping:         neomycin (MYCIFRADIN) 500 MG tablet Comments:   Reason for Stopping:         ondansetron (ZOFRAN) 4 MG tablet Comments:   Reason for Stopping:         polyethylene glycol (MIRALAX) 17 g packet Comments:   Reason for Stopping:         traMADol (ULTRAM) 50 MG tablet Comments:   Reason for Stopping:                     Brief History of Illness:   21 year old female who underwent an appendectomy in Feb 2022, pathology showed a well-differentiated neuroendocrine tumor of the  "appendix.  Now is s/p laparoscopic robotic assisted right hemicolectomy, anne-marie-cut liver biopsy x2 and ICG angiography on 4/12/2022.           Hospital Course:   Post-operatively pt was gently fluid resuscitated.  Valdez was removed and pt was able to void.  Patient's Hgb did slowly trend down.  Due to this standard post-operative prophylactic lovenox and toradol was stopped.  Hgb stabilized at 7.9.  Pt had eventual return of bowel function and was able to tolerate small amounts of a low fiber diet. Pt was passing nonbloody BM and flatus at time of discharge. Post-operative pain was controlled with scheduled tylenol, gabapentin, robaxin and prn oxycodone. NO LOVENOX PRESCRIBED AT DISCHARGE    Patient is to follow up in the Colon and Rectal Surgery Clinic in 2-3 week with Heena Triana NP and then with Dr. Campbell in 2-3 weeks after.          Day of Discharge Physical Exam:   Blood pressure 107/67, pulse 91, temperature 98.5  F (36.9  C), temperature source Oral, resp. rate 17, height 1.6 m (5' 3\"), weight 52.7 kg (116 lb 2.9 oz), last menstrual period 03/29/2022, SpO2 98 %, not currently breastfeeding.    Gen: AAOx3, NAD  Pulm: Non-labored breathing  Abd: Soft, appropriately tender, no guarding   Incision C/D/I with liquid skin adhesive  Ext:  Warm and well-perfused         Final Pathology Result:   Final pathology pending           Discharge Instructions and Follow-Up:     Discharge Procedure Orders   Reason for your hospital stay   Order Comments: 4/12/2022:  PROCEDURE:  1. Laparoscopic robotic-assisted right hemicolectomy.  2. Anne-Marie-cut liver biopsy x2  3. ICG angiography     Activity   Order Comments: Your activity upon discharge:   ACTIVITY  -No lifting, pushing, pulling greater than 10 lbs and no strenuous exercise for 6 weeks   -No driving while on narcotic analgesics (i.e. Percocet, oxycodone, Vicodin)  -No driving until you are able to fully twist to both sides or slam on brakes quickly and " without any pain  -We encourage walking at least 4-5 times per day     Order Specific Question Answer Comments   Is discharge order? Yes      Adult [facility] Follow-up and recommended labs and tests   Order Comments: WOUND CARE  -Inspect your wounds daily for signs of infection (increased redness, drainage, pain)  -Keep your wound clean and dry  -You may shower, but do not soak in tub or pool    NOTIFY  Please contact Kiara Gonzáles RN or Zaida Sifuentes RN at [phone] for problems after discharge such as:  -Temperature > 101F, chills, rigors, dizziness  -Redness around or purulent drainage from wound  -Inability to tolerate diet, nausea or vomiting  -You stop passing gas, develop significant bloating, abdominal pain  -Have blood in stools/vomit  -Have severe diarrhea/constipation  -Any other questions or concerns.  - At nights (after 4:30pm), on weekends, or if urgent, call [phone] and ask the  to speak with the on-call Colorectal Surgery resident or fellow      Medication Instructions  Some of your medications may have changed. Please take only prescribed and resumed medications     FOLLOW-UP  1.  You will need to follow-up with Heena Triana NP in the Colon and Rectal Surgery clinic in 2-3 week(s) and then with CRS Staff: Dr. Jacinto Campbell in 2-3 weeks after.  Please contact our clinic scheduler (phone # [phone]) if you have not heard from our clinic in 3 business days afer discharge to schedule a follow-up appointment.     2.  Follow up with your primary care provider in 1-2 weeks after discharge from the hospital to review this hospitalization.     3.  Please be mindful of how much tylenol/acetaminophen that you are taking.  We recommend that you take scheduled tylenol to help with post-operative pain as prescribed.  You will need to use caution when taking other medications that also contain tylenol - for example many migraine medications also contain tylenol.    The daily  maximum of tylenol/acetaminophen is 4000mg or 4 grams.    Do not exceed 4000mg or 4 grams in a 24 hour period.    If you take a migraine medication that contains tylenol/acetaminophen, then please take less post-surgical tylenol/acetaminophen.     4.  Please wait 3-4 weeks after discharge before starting Spironolactone, Sulfacetamide Sodium Sulfur, tretinoin.  Wait until you have recovered from surgery prior to introducing new medications if possible.       Appointments on [place] and/or [facility] (with [facility] or [facility] provider or service). Call [phone] if you haven't heard regarding these appointments within 7 days of discharge.     Diet   Order Comments: Follow this diet upon discharge:   DIET  -Low Residue Diet for at least 4-6 weeks unless cleared by Colorectal surgery.  No raw vegetables, fruit skins, fibrous foods that require a lot of chewing, nuts, seeds, corn, popcorn.   -We recommend eating slowly, chewing thoroughly, eating small frequent meals throughout the day  -Stay well hydrated.     Order Specific Question Answer Comments   Is discharge order? Yes             Home Health Care:     Not needed           Discharge Disposition:     Discharged to home      Condition at discharge: Stable          Pt was discussed with Dr. Juanito Shaw on 4/17    The above plan of care was performed and communicated to me by Dr. Campbell

## 2022-04-16 PROCEDURE — 250N000013 HC RX MED GY IP 250 OP 250 PS 637: Performed by: PHYSICIAN ASSISTANT

## 2022-04-16 PROCEDURE — 250N000011 HC RX IP 250 OP 636

## 2022-04-16 PROCEDURE — 120N000002 HC R&B MED SURG/OB UMMC

## 2022-04-16 PROCEDURE — 250N000013 HC RX MED GY IP 250 OP 250 PS 637

## 2022-04-16 RX ADMIN — ONDANSETRON 4 MG: 4 TABLET, ORALLY DISINTEGRATING ORAL at 06:57

## 2022-04-16 RX ADMIN — ACETAMINOPHEN 975 MG: 325 TABLET ORAL at 08:23

## 2022-04-16 RX ADMIN — ACETAMINOPHEN 975 MG: 325 TABLET ORAL at 16:01

## 2022-04-16 RX ADMIN — ACETAMINOPHEN 975 MG: 325 TABLET ORAL at 00:35

## 2022-04-16 RX ADMIN — GABAPENTIN 100 MG: 100 CAPSULE ORAL at 14:47

## 2022-04-16 RX ADMIN — GABAPENTIN 100 MG: 100 CAPSULE ORAL at 19:36

## 2022-04-16 RX ADMIN — OXYCODONE HYDROCHLORIDE 5 MG: 5 TABLET ORAL at 10:48

## 2022-04-16 RX ADMIN — OXYCODONE HYDROCHLORIDE 5 MG: 5 TABLET ORAL at 22:53

## 2022-04-16 RX ADMIN — OXYCODONE HYDROCHLORIDE 5 MG: 5 TABLET ORAL at 18:39

## 2022-04-16 RX ADMIN — OXYCODONE HYDROCHLORIDE 5 MG: 5 TABLET ORAL at 06:53

## 2022-04-16 RX ADMIN — METHOCARBAMOL 500 MG: 500 TABLET ORAL at 14:47

## 2022-04-16 RX ADMIN — PROCHLORPERAZINE MALEATE 10 MG: 5 TABLET ORAL at 03:16

## 2022-04-16 RX ADMIN — METHOCARBAMOL 500 MG: 500 TABLET ORAL at 08:23

## 2022-04-16 RX ADMIN — OXYCODONE HYDROCHLORIDE 5 MG: 5 TABLET ORAL at 00:35

## 2022-04-16 RX ADMIN — METHOCARBAMOL 500 MG: 500 TABLET ORAL at 19:38

## 2022-04-16 RX ADMIN — GABAPENTIN 100 MG: 100 CAPSULE ORAL at 08:23

## 2022-04-16 RX ADMIN — OXYCODONE HYDROCHLORIDE 5 MG: 5 TABLET ORAL at 14:47

## 2022-04-16 ASSESSMENT — ACTIVITIES OF DAILY LIVING (ADL)
ADLS_ACUITY_SCORE: 7
ADLS_ACUITY_SCORE: 7
ADLS_ACUITY_SCORE: 5
ADLS_ACUITY_SCORE: 6
ADLS_ACUITY_SCORE: 8
ADLS_ACUITY_SCORE: 7
ADLS_ACUITY_SCORE: 6
ADLS_ACUITY_SCORE: 7
ADLS_ACUITY_SCORE: 6
ADLS_ACUITY_SCORE: 8
ADLS_ACUITY_SCORE: 5
ADLS_ACUITY_SCORE: 6
ADLS_ACUITY_SCORE: 6
ADLS_ACUITY_SCORE: 7
ADLS_ACUITY_SCORE: 8
ADLS_ACUITY_SCORE: 7
ADLS_ACUITY_SCORE: 7
ADLS_ACUITY_SCORE: 6
ADLS_ACUITY_SCORE: 6
ADLS_ACUITY_SCORE: 7
ADLS_ACUITY_SCORE: 6
ADLS_ACUITY_SCORE: 7

## 2022-04-16 NOTE — PLAN OF CARE
"Goal Outcome Evaluation:    Plan of Care Reviewed With: patient, family     Overall Patient Progress: improving    Reports pain well controlled with oxycodone 5 mg every 4 hours along with scheduled tylenol, gabapentin, and robaxin. Appetite improving on low fiber diet. Ambulating halls independently with family 4 times today. Showered. Incisions intact with dermabond. Small BM times 2 today. Vitals stable. /66 (BP Location: Right arm)   Pulse 95   Temp 99.7  F (37.6  C) (Temporal)   Resp 20   Ht 1.6 m (5' 3\")   Wt 52.7 kg (116 lb 2.9 oz)   LMP 03/29/2022   SpO2 97%   BMI 20.58 kg/m               "

## 2022-04-16 NOTE — PROGRESS NOTES
Colorectal Surgery Progress Note  [facility]  POD#4      Subjective:    Overnight Events: None.     Pain controlled with oxycodone, tylenol, robaxin, gabapentin, dilaudid. Tolerating full liquid diet. Some nausea, no vomiting. Passing gas and had first bowel movement yesterday, it was green and liquid.     Vitals:  Vitals:    04/15/22 1512 04/15/22 1746 04/15/22 2206 04/16/22 0304   BP: 111/65 121/74 104/63    BP Location: Right arm  Right arm    Pulse: 92 98 99    Resp: 16  20    Temp: 98.8  F (37.1  C)  99.7  F (37.6  C) 97.5  F (36.4  C)   TempSrc: Oral  Oral Oral   SpO2: 98% 99% 96%    Weight:       Height:           I/O:  I/O last 3 completed shifts:  In: 500 [P.O.:500]  Out: 450 [Urine:450]    Physical Exam:  Gen:      AAOx3, NAD  Pulm:    Non-labored breathing  Abd:      Soft, not distended, appropriately tender, no guarding.                Laparoscopic incisions and lower incision clean, dry, and intact.   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 Campbell. No immediate postop complications, EBL was 50 ml.     Interval Developments:  -Had drop in hemoglobin, now stable at 7.9. No significant blood loss during surgery and currently hemodynamically stable. May have had small amount of bleeding around stable line, would expect a bloody bowel movement. Discussed  this with the patient. Had first bowel movement 4/15, non bloody.      -AM labs: None  -IVF: None  -Diet: Low fiber diet  -Pain: acetaminophen, gabapentin, robaxin, oxycodone, dilaudid  -Antibiotics: None  -Surgical path: Pending  -LDA: None  -Consults: None  -Activity: as tolerated. PT and OT consulted.   -Prophylaxis: Hold enoxaparin given hemoglobin. No lovenox on discharge.    Dispo: 2-3 days pending return of bowel function, stable hemoglobin    Vicki Gaytan  MS-4    Agree w/ above.    Marcial Mccabe MD  Fellow in Colon and Rectal Surgery  [facility]  Pager: [phone]

## 2022-04-16 NOTE — PLAN OF CARE
[id number]  POD 3 s/p R hemicolectomy and liver bx.  Alert, oriented, up with SBA.    Tolerating full liquids, intermittent nausea.  Not passing gas, had a BM this evening.  Voiding, not saving.  Abdominal discomfort managed with oxycodone 5mg, robaxin and tylenol.  PLAN: Discharge soon, once meeting post op goals and full ROBF.

## 2022-04-16 NOTE — PLAN OF CARE
Assumed cares [id number]. A&Ox4, Vital signs stable on room air. Abdominal Lap sites clean, dry and intact.Pain managed with PRN oxy.Compazine and zofran given for nausea with some relief. Passing gas, last BM 4/15. Voiding spontaneously.PIV saline locked. Up with SBA. Tolerating full liquid diet. Continue POC.

## 2022-04-17 VITALS
TEMPERATURE: 98.5 F | SYSTOLIC BLOOD PRESSURE: 107 MMHG | DIASTOLIC BLOOD PRESSURE: 67 MMHG | WEIGHT: 116.18 LBS | HEART RATE: 91 BPM | BODY MASS INDEX: 20.59 KG/M2 | OXYGEN SATURATION: 98 % | HEIGHT: 63 IN | RESPIRATION RATE: 17 BRPM

## 2022-04-17 PROCEDURE — 250N000013 HC RX MED GY IP 250 OP 250 PS 637: Performed by: PHYSICIAN ASSISTANT

## 2022-04-17 PROCEDURE — 250N000013 HC RX MED GY IP 250 OP 250 PS 637

## 2022-04-17 RX ORDER — METHOCARBAMOL 500 MG/1
500 TABLET, FILM COATED ORAL 4 TIMES DAILY PRN
Qty: 20 TABLET | Refills: 0 | Status: SHIPPED | OUTPATIENT
Start: 2022-04-17 | End: 2022-08-09

## 2022-04-17 RX ORDER — ACETAMINOPHEN 500 MG
500 TABLET ORAL EVERY 6 HOURS
Qty: 90 TABLET | Refills: 0 | Status: SHIPPED | OUTPATIENT
Start: 2022-04-17 | End: 2022-12-12

## 2022-04-17 RX ORDER — OXYCODONE HYDROCHLORIDE 5 MG/1
5 TABLET ORAL EVERY 6 HOURS PRN
Qty: 20 TABLET | Refills: 0 | Status: SHIPPED | OUTPATIENT
Start: 2022-04-17 | End: 2022-08-09

## 2022-04-17 RX ORDER — IBUPROFEN 400 MG/1
400 TABLET, FILM COATED ORAL EVERY 6 HOURS
Qty: 90 TABLET | Refills: 0 | Status: SHIPPED | OUTPATIENT
Start: 2022-04-17 | End: 2022-12-12

## 2022-04-17 RX ADMIN — ACETAMINOPHEN 975 MG: 325 TABLET ORAL at 07:23

## 2022-04-17 RX ADMIN — OXYCODONE HYDROCHLORIDE 5 MG: 5 TABLET ORAL at 07:23

## 2022-04-17 RX ADMIN — GABAPENTIN 100 MG: 100 CAPSULE ORAL at 07:23

## 2022-04-17 RX ADMIN — OXYCODONE HYDROCHLORIDE 5 MG: 5 TABLET ORAL at 03:05

## 2022-04-17 RX ADMIN — METHOCARBAMOL 500 MG: 500 TABLET ORAL at 03:06

## 2022-04-17 RX ADMIN — ACETAMINOPHEN 975 MG: 325 TABLET ORAL at 00:05

## 2022-04-17 RX ADMIN — METHOCARBAMOL 500 MG: 500 TABLET ORAL at 07:27

## 2022-04-17 ASSESSMENT — ACTIVITIES OF DAILY LIVING (ADL)
ADLS_ACUITY_SCORE: 7

## 2022-04-17 NOTE — PLAN OF CARE
[id number]  Pt doing well.  States she feels much better than yesterday.  Up ad blaise in room. Mother at bedside.  Pain well managed with tylenol, robaxin, oxycodone.  Tolerating small amts low fiber diet, denies nausea.  Passing gas and stool.  Voiding spontaneously. Meeting all post-op goals.

## 2022-04-17 NOTE — PLAN OF CARE
Goal Outcome Evaluation:    Plan of Care Reviewed With: patient     Overall Patient Progress: improving    Stable for discharge home with family. Reports pain well controlled with oral meds. No nausea. Incisions intact with dermabond. Reviewed discharge instructions, meds, and follow up plan. Patient and family able to teach back.

## 2022-04-17 NOTE — PLAN OF CARE
Goal Outcome Evaluation:  Assumed cares [phone]. VSS on RA.Pain managed with PRN oxy, scheduled tylenol and robaxin. Passing gas, last BM 4/16.Abd Lap sites intact.Voiding spontaneously with good urine output. PIV saline locked. Tolerating a low fiber diet. Denies nausea this shift. Up independently in room. Continue POC.

## 2022-04-18 ENCOUNTER — PATIENT OUTREACH (OUTPATIENT)
Dept: SURGERY | Facility: CLINIC | Age: 22
End: 2022-04-18
Payer: COMMERCIAL

## 2022-04-18 ENCOUNTER — PATIENT OUTREACH (OUTPATIENT)
Dept: CARE COORDINATION | Facility: CLINIC | Age: 22
End: 2022-04-18
Payer: COMMERCIAL

## 2022-04-18 DIAGNOSIS — Z71.89 OTHER SPECIFIED COUNSELING: ICD-10-CM

## 2022-04-18 LAB
PATH REPORT.COMMENTS IMP SPEC: NORMAL
PATH REPORT.FINAL DX SPEC: NORMAL
PATH REPORT.GROSS SPEC: NORMAL
PATH REPORT.INTRAOP OBS SPEC DOC: NORMAL
PATH REPORT.MICROSCOPIC SPEC OTHER STN: NORMAL
PATH REPORT.RELEVANT HX SPEC: NORMAL
PHOTO IMAGE: NORMAL

## 2022-04-18 NOTE — PROGRESS NOTES
Post Op Note     Called to check on patient postoperatively after hospital discharge.     Patient is s/p right hemicolectomy with liver bx with Dr. Jacinto Campbell  for neuroendocrine tumor.   Admitted 4/12/2022 and discharged on 4/17/2022.      Pain is well controlled with tylenol, oxycodone, gabapentin, robaxin     Patient is eating and drinking normally. Patient is on a low fiber diet.  Encouraged patient to drink 8-10 glasses of water a day. She is eating small frequent meals     Patient is passing flats, is having soft/formed bowel movements.    Patient is voiding normally and urine is light in color.    Patient is not set up with home care.     Patient Denies nausea and vomiting.    Patient Denies any fevers or chills.    Patient's incision is C/D/I. Patient reminded NOT to remove any dressings over their incisions.     Patient is on a activity restriction. Lifting 10 pounds for 6 weeks.     Patient Denies needing any forms completed.     Follow up is set up with Heena Melgoza NP on 4/27 and with Dr. Jcainto Campbell  on 5/19.   Encouraged the patient to contact the clinic in the meantime with any fevers, chills, nausea, vomiting, increased colostomy output, no colostomy output, dizziness, lightheadedness, uncontrolled pain, changes to the incisions, or with any questions or concerns.    Patient's questions were answered to their stated satisfaction and they are in agreement with this plan.    SCAR Craig [phone]  Colon & Rectal Surgery Clinic  [facility]

## 2022-04-18 NOTE — PROGRESS NOTES
Clinic Care Coordination Contact  Community Health Worker Initial Outreach    Patient accepts CC: No, Pt declined needs for extra support, confirmed with Pt upcoming appt's.       Clinic Care Coordination Contact   Adelfo Nolen: Post-Discharge Note  SITUATION                                                      Admission:    Admission Date: 04/12/22   Reason for Admission: Neuroendocrine tumor of the appendix  S/p prior appendectomy  Acute blood loss anemia  Discharge:   Discharge Date: 04/17/22  Discharge Diagnosis: Neuroendocrine tumor of the appendix  S/p prior appendectomy  Acute blood loss anemia    BACKGROUND                                                      Per hospital discharge summary and inpatient provider notes:    21 year old female who underwent an appendectomy in Feb 2022, pathology showed a well-differentiated neuroendocrine tumor of the appendix.  Now is s/p laparoscopic robotic assisted right hemicolectomy, anne-marie-cut liver biopsy x2 and ICG angiography on 4/12/2022.    ASSESSMENT      Enrollment  Primary Care Care Coordination Status: Declined    Discharge Assessment  How are you doing now that you are home?: feeling well  How are your symptoms? (Red Flag symptoms escalate to triage hotline per guidelines): Improved  Do you feel your condition is stable enough to be safe at home until your provider visit?: Yes  Does the patient have their discharge instructions? : Yes  Does the patient have questions regarding their discharge instructions? : No  Were you started on any new medications or were there changes to any of your previous medications? : No  Does the patient have all of their medications?: Yes  Do you have questions regarding any of your medications? : No  Do you have all of your needed medical supplies or equipment (DME)?  (i.e. oxygen tank, CPAP, cane, etc.): Yes  Discharge follow-up appointment scheduled within 14 calendar days? : Yes  Discharge Follow Up Appointment Date:  04/27/22  Discharge Follow Up Appointment Scheduled with?: Specialty Care Provider    Post-op ([facility] Only)  If the patient had a surgery or procedure, do they have any questions for a nurse?: No         PLAN                                                      Outpatient Plan:      Appointments on [place] and/or [facility] (with [facility] or [facility] provider or service). Call [phone] if you haven't heard regarding these appointments within 7 days of discharge.    Future Appointments   Date Time Provider Department Center   4/27/2022 11:30 AM Heena Melgoza APRN [facility]   5/19/2022  9:00 AM Jacinto Campbell MD [facility]   5/23/2022 10:15 AM Paul Mera MD [facility]         For any urgent concerns, please contact our 24 hour nurse triage line: [phone] ([address] [place] [address])       MIR Feliciano  [phone]  [facility]

## 2022-04-18 NOTE — PLAN OF CARE
Physical Therapy Discharge Summary    Reason for therapy discharge:    Discharged to home.    Progress towards therapy goal(s). See goals on Care Plan in [facility] electronic health record for goal details.  Goals partially met.  Barriers to achieving goals:   discharge from facility.    Therapy recommendation(s):    No further therapy is recommended.

## 2022-04-19 ENCOUNTER — TELEPHONE (OUTPATIENT)
Facility: CLINIC | Age: 22
End: 2022-04-19
Payer: COMMERCIAL

## 2022-04-19 NOTE — TELEPHONE ENCOUNTER
Health Call Center    Phone Message    May a detailed message be left on voicemail: yes     Reason for Call: Other: Pt's mother, Maggie, is calling in asking for a call back from Kiara or another RN from Dr. Campbell' team. She states that the patient has been dealing with pain in her lower right abdomen, and they wanted to speak with someone about whether this is cause for concern. Please call back as soon as possible to discuss.     Action Taken: Message routed to:  [facility]al    Travel Screening: Not Applicable

## 2022-04-19 NOTE — TELEPHONE ENCOUNTER
Patient called with 9/10 RLQ pain. She has been taking her pain meds regularly (oxycodone, robaxin, tylenol). She took the oxycodone and robaxin and is feeling better. The pain is sharp and achy. Denies nausea, vomiting, fevers, chills. Last BM was Sunday. Discussed with Dr. Campbell. Start daily miralax and monitor for nausea, vomiting, fevers, chills or increased abd pain.

## 2022-04-27 ENCOUNTER — OFFICE VISIT (OUTPATIENT)
Dept: SURGERY | Facility: CLINIC | Age: 22
End: 2022-04-27
Payer: COMMERCIAL

## 2022-04-27 ENCOUNTER — LAB (OUTPATIENT)
Dept: LAB | Facility: CLINIC | Age: 22
End: 2022-04-27
Payer: COMMERCIAL

## 2022-04-27 VITALS
DIASTOLIC BLOOD PRESSURE: 63 MMHG | HEART RATE: 66 BPM | HEIGHT: 63 IN | WEIGHT: 118.7 LBS | SYSTOLIC BLOOD PRESSURE: 100 MMHG | BODY MASS INDEX: 21.03 KG/M2 | OXYGEN SATURATION: 98 %

## 2022-04-27 DIAGNOSIS — D3A.8 NEUROENDOCRINE NEOPLASM OF APPENDIX (H): ICD-10-CM

## 2022-04-27 DIAGNOSIS — D62 ANEMIA DUE TO BLOOD LOSS, ACUTE: ICD-10-CM

## 2022-04-27 DIAGNOSIS — Z09 FOLLOW-UP EXAMINATION AFTER COLORECTAL SURGERY: Primary | ICD-10-CM

## 2022-04-27 LAB — HGB BLD-MCNC: 10.8 G/DL (ref 11.7–15.7)

## 2022-04-27 PROCEDURE — 36415 COLL VENOUS BLD VENIPUNCTURE: CPT | Performed by: PATHOLOGY

## 2022-04-27 PROCEDURE — 85018 HEMOGLOBIN: CPT | Performed by: PATHOLOGY

## 2022-04-27 PROCEDURE — 99024 POSTOP FOLLOW-UP VISIT: CPT | Performed by: NURSE PRACTITIONER

## 2022-04-27 ASSESSMENT — PAIN SCALES - GENERAL: PAINLEVEL: NO PAIN (0)

## 2022-04-27 NOTE — NURSING NOTE
"Chief Complaint   Patient presents with     Post-op Visit     Post op, DOS: 4/12/22       Vitals:    04/27/22 1152   BP: 100/63   BP Location: Left arm   Patient Position: Sitting   Cuff Size: Adult Regular   Pulse: 66   SpO2: 98%   Weight: 53.8 kg (118 lb 11.2 oz)   Height: 1.6 m (5' 3\")       Body mass index is 21.03 kg/m .                          Dulce Maria Mills, EMT    "

## 2022-04-27 NOTE — LETTER
"2022       RE: Na De León  [address]   [place] [address]     Dear Colleague,    Thank you for referring your patient, Na De León, to the [facility] [place] [facility]. Please see a copy of my visit note below.    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 Campbell.    Final Diagnosis   A(1). Left lobe liver lesion, biopsy:  - Unremarkable liver  - No evidence of neuroendocrine tumor     B(2). Right lobe liver lesion, biopsy:  - Unremarkable liver  - No evidence of neuroendocrine tumor     C(3). Terminal ileum and right colon, right hemicolectomy:  - Unremarkable terminal ileum and right colon  - Two of eighteen lymph nodes positive for metastatic neuroendocrine tumor (2/18)     Interval history: Na has been doing well since surgery.  However, she has had some numbness in her right lower thigh that she did not notice right after surgery.  She thinks this is getting slightly worse.  No difficulty with walking.  She is only taking Tylenol and ibuprofen for pain.  No fevers or chills.  She is tolerating a low residue diet.  She is having normal, soft bowel movements.    Physical Examination:   /63 (BP Location: Left arm, Patient Position: Sitting, Cuff Size: Adult Regular)   Pulse 66   Ht 5' 3\"   Wt 118 lb 11.2 oz   LMP 2022   SpO2 98%   BMI 21.03 kg/m    General: alert, oriented, in no acute distress, sitting comfortably  HEENT: mucous membranes moist  Abdomen: Incision sites well approximated without any erythema or drainage.    Assessment/Plan:  21 year old female status " post laparoscopic robotic assisted right hemicolectomy, liver biopsy X2, and ICG angiography on 4/12/22 with Dr. Campbell.  She is recovering well.  I am unsure why she is having some numbness in her thigh.  Would like to give this more time to see if it resolves.  However, if it worsens asked her to contact the clinic.  Incisions are healing well.  Bowel function is normal.  Recommended Tylenol instead of ibuprofen given her low hemoglobin after surgery and we will recheck this again today.  She is scheduled for follow-up with oncology and Dr. Dr. Campbell next month.  Encouraged her to contact clinic in the meantime with any questions or concerns. Patient's questions were answered to her stated satisfaction and she is in agreement with this plan.    Medical history:  Past Medical History:   Diagnosis Date     Anxiety      Concussion      Migraine      Neuroendocrine neoplasm of appendix 02/2022       Surgical history:  Past Surgical History:   Procedure Laterality Date     APPENDECTOMY  02/12/2022     DAVINCI COLECTOMY N/A 4/12/2022    Procedure: Robotic right hemicolectomy, true cut liver biopsy x2. Indocyanine green (ICG) angiography.;  Surgeon: Jacinto Campbell MD;  Location:  OR     DENTAL SURGERY      wisdom teeth       Problem list:    Patient Active Problem List    Diagnosis Date Noted     S/P right hemicolectomy 04/12/2022     Priority: Medium     Anxiety 10/06/2020     Priority: Medium     Migraine without aura and without status migrainosus, not intractable 05/11/2020     Priority: Medium     Concussion without loss of consciousness 08/15/2019     Priority: Medium     Evaluated at [facility] with persistent symptoms. Erum Brewster MD 10/14/2019 2:14 PM            Medications:  Current Outpatient Medications   Medication Sig Dispense Refill     acetaminophen (TYLENOL) 500 MG tablet Take 1 tablet (500 mg) by mouth every 6 hours 90 tablet 0     buPROPion (WELLBUTRIN XL)  150 MG 24 hr tablet [BUPROPION (WELLBUTRIN XL) 150 MG 24 HR TABLET] Take 1 tablet (150 mg total) by mouth daily. (Patient taking differently: Take 150 mg by mouth every morning) 90 tablet 3     butalbital-acetaminophen-caffeine (ESGIC) -40 MG tablet Take 1 tablet by mouth every 4 hours as needed for headaches 60 tablet 1     ibuprofen (ADVIL/MOTRIN) 400 MG tablet Take 1 tablet (400 mg) by mouth every 6 hours 90 tablet 0     spironolactone (ALDACTONE) 50 MG tablet        Sulfacetamide Sodium-Sulfur 10-5 % LIQD APPLY TO FACE AND BACK ONCE DAILY       Sulfacetamide Sodium-Sulfur 10-5 % SUSP        tretinoin (RETIN-A) 0.025 % external cream        methocarbamol (ROBAXIN) 500 MG tablet Take 1 tablet (500 mg) by mouth 4 times daily as needed for muscle spasms (Patient not taking: Reported on 4/27/2022) 20 tablet 0     oxyCODONE (ROXICODONE) 5 MG tablet Take 1 tablet (5 mg) by mouth every 6 hours as needed for moderate to severe pain (Patient not taking: Reported on 4/27/2022) 20 tablet 0       Allergies:  Allergies   Allergen Reactions     Grass Extracts [Gramineae Pollens]      Other reaction(s): Other (see comments)  Seasonal     Other Environmental Allergy Unknown     Seasonal       Family history:  Family History   Problem Relation Age of Onset     Basal cell carcinoma Mother      Hyperlipidemia Father      Anxiety Disorder Sister      Anxiety Disorder Sister      No Known Problems Sister      Chronic Obstructive Pulmonary Disease Maternal Grandmother      Rheumatoid Arthritis Maternal Grandmother      Hypertension Maternal Grandmother      Depression Maternal Grandfather      Alzheimer Disease Maternal Grandfather      Prostate Cancer Maternal Grandfather      Pacemaker Paternal Grandmother      Deep Vein Thrombosis (DVT) No family hx of        Social history:  Social History     Tobacco Use     Smoking status: Never Smoker     Smokeless tobacco: Never Used   Substance Use Topics     Alcohol use: Yes  "    Marital status: single.  Nursing Notes:   Dulce Maria Mills, EMT  4/27/2022 11:55 AM  Signed  Chief Complaint   Patient presents with     Post-op Visit     Post op, DOS: 4/12/22       Vitals:    04/27/22 1152   BP: 100/63   BP Location: Left arm   Patient Position: Sitting   Cuff Size: Adult Regular   Pulse: 66   SpO2: 98%   Weight: 53.8 kg (118 lb 11.2 oz)   Height: 1.6 m (5' 3\")       Body mass index is 21.03 kg/m .      Dulce Maria Mills, EMT      20 minutes spent on the date of the encounter doing chart review, history and exam, documentation and further activities as noted above.   This is a postop visit.      KIARRA Naqvi, NP-C  Colon and Rectal Surgery  [facility]      This note was created using speech recognition software and may contain unintended word substitutions.  "

## 2022-04-27 NOTE — PROGRESS NOTES
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 Campbell.    Final Diagnosis   A(1). Left lobe liver lesion, biopsy:  - Unremarkable liver  - No evidence of neuroendocrine tumor     B(2). Right lobe liver lesion, biopsy:  - Unremarkable liver  - No evidence of neuroendocrine tumor     C(3). Terminal ileum and right colon, right hemicolectomy:  - Unremarkable terminal ileum and right colon  - Two of eighteen lymph nodes positive for metastatic neuroendocrine tumor (2/18)     Interval history: Na has been doing well since surgery.  However, she has had some numbness in her right lower thigh that she did not notice right after surgery.  She thinks this is getting slightly worse.  No difficulty with walking.  She is only taking Tylenol and ibuprofen for pain.  No fevers or chills.  She is tolerating a low residue diet.  She is having normal, soft bowel movements.    Physical Examination:   /63 (BP Location: Left arm, Patient Position: Sitting, Cuff Size: Adult Regular)   Pulse 66   Ht 5' 3\"   Wt 118 lb 11.2 oz   LMP 2022   SpO2 98%   BMI 21.03 kg/m    General: alert, oriented, in no acute distress, sitting comfortably  HEENT: mucous membranes moist  Abdomen: Incision sites well approximated without any erythema or drainage.    Assessment/Plan:  21 year old female status post laparoscopic robotic assisted right hemicolectomy, liver biopsy X2, and ICG angiography on 22 with Dr. Campbell.  She is recovering well.  I am unsure why she is having some numbness in her thigh.  Would like to give this more time to see if it resolves.  However, if it worsens asked her to contact the clinic.  " Incisions are healing well.  Bowel function is normal.  Recommended Tylenol instead of ibuprofen given her low hemoglobin after surgery and we will recheck this again today.  She is scheduled for follow-up with oncology and . Dr. Campbell next month.  Encouraged her to contact clinic in the meantime with any questions or concerns. Patient's questions were answered to her stated satisfaction and she is in agreement with this plan.    Medical history:  Past Medical History:   Diagnosis Date     Anxiety      Concussion      Migraine      Neuroendocrine neoplasm of appendix 02/2022       Surgical history:  Past Surgical History:   Procedure Laterality Date     APPENDECTOMY  02/12/2022     DAVINCI COLECTOMY N/A 4/12/2022    Procedure: Robotic right hemicolectomy, true cut liver biopsy x2. Indocyanine green (ICG) angiography.;  Surgeon: Jacinto Campbell MD;  Location: [facility] OR     DENTAL SURGERY      wisdom teeth       Problem list:    Patient Active Problem List    Diagnosis Date Noted     S/P right hemicolectomy 04/12/2022     Priority: Medium     Anxiety 10/06/2020     Priority: Medium     Migraine without aura and without status migrainosus, not intractable 05/11/2020     Priority: Medium     Concussion without loss of consciousness 08/15/2019     Priority: Medium     Evaluated at [facility] with persistent symptoms. Erum Brewster MD 10/14/2019 2:14 PM            Medications:  Current Outpatient Medications   Medication Sig Dispense Refill     acetaminophen (TYLENOL) 500 MG tablet Take 1 tablet (500 mg) by mouth every 6 hours 90 tablet 0     buPROPion (WELLBUTRIN XL) 150 MG 24 hr tablet [BUPROPION (WELLBUTRIN XL) 150 MG 24 HR TABLET] Take 1 tablet (150 mg total) by mouth daily. (Patient taking differently: Take 150 mg by mouth every morning) 90 tablet 3     butalbital-acetaminophen-caffeine (ESGIC) -40 MG tablet Take 1 tablet by mouth every 4 hours as needed for headaches 60 tablet  1     ibuprofen (ADVIL/MOTRIN) 400 MG tablet Take 1 tablet (400 mg) by mouth every 6 hours 90 tablet 0     spironolactone (ALDACTONE) 50 MG tablet        Sulfacetamide Sodium-Sulfur 10-5 % LIQD APPLY TO FACE AND BACK ONCE DAILY       Sulfacetamide Sodium-Sulfur 10-5 % SUSP        tretinoin (RETIN-A) 0.025 % external cream        methocarbamol (ROBAXIN) 500 MG tablet Take 1 tablet (500 mg) by mouth 4 times daily as needed for muscle spasms (Patient not taking: Reported on 4/27/2022) 20 tablet 0     oxyCODONE (ROXICODONE) 5 MG tablet Take 1 tablet (5 mg) by mouth every 6 hours as needed for moderate to severe pain (Patient not taking: Reported on 4/27/2022) 20 tablet 0       Allergies:  Allergies   Allergen Reactions     Grass Extracts [Gramineae Pollens]      Other reaction(s): Other (see comments)  Seasonal     Other Environmental Allergy Unknown     Seasonal       Family history:  Family History   Problem Relation Age of Onset     Basal cell carcinoma Mother      Hyperlipidemia Father      Anxiety Disorder Sister      Anxiety Disorder Sister      No Known Problems Sister      Chronic Obstructive Pulmonary Disease Maternal Grandmother      Rheumatoid Arthritis Maternal Grandmother      Hypertension Maternal Grandmother      Depression Maternal Grandfather      Alzheimer Disease Maternal Grandfather      Prostate Cancer Maternal Grandfather      Pacemaker Paternal Grandmother      Deep Vein Thrombosis (DVT) No family hx of        Social history:  Social History     Tobacco Use     Smoking status: Never Smoker     Smokeless tobacco: Never Used   Substance Use Topics     Alcohol use: Yes     Marital status: single.  Nursing Notes:   Dulce Maria Mills, EMT  4/27/2022 11:55 AM  Signed  Chief Complaint   Patient presents with     Post-op Visit     Post op, DOS: 4/12/22       Vitals:    04/27/22 1152   BP: 100/63   BP Location: Left arm   Patient Position: Sitting   Cuff Size: Adult Regular   Pulse: 66   SpO2: 98%  "  Weight: 53.8 kg (118 lb 11.2 oz)   Height: 1.6 m (5' 3\")       Body mass index is 21.03 kg/m .                          Dulce Maria Mills EMT         20 minutes spent on the date of the encounter doing chart review, history and exam, documentation and further activities as noted above.   This is a postop visit.    KIARRA Naqvi, NP-C  Colon and Rectal Surgery  [facility]    This note was created using speech recognition software and may contain unintended word substitutions.    "

## 2022-05-10 ENCOUNTER — PATIENT OUTREACH (OUTPATIENT)
Dept: SURGERY | Facility: CLINIC | Age: 22
End: 2022-05-10
Payer: COMMERCIAL

## 2022-05-10 DIAGNOSIS — R20.0 NUMBNESS OF RIGHT ANTERIOR THIGH: Primary | ICD-10-CM

## 2022-05-10 NOTE — PROGRESS NOTES
Na called and stated that her right thigh numbness has not gotten any better. It has been stable since surgery. Discussed with Dr. Campbell. This could be related to a OR positioning injury. Plan for PT referral.

## 2022-05-12 NOTE — PROGRESS NOTES
Colon and Rectal Surgery Clinic Note    RE: Na De León.  : 2000.  DEVEN: 2022.    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 Surgical Pathology revealed two of eighteen lymph nodes positive for metastatic neuroendocrine tumor. Unfortunately, she has had persistent right thigh numbness. I placed a referral to physical therapy for a possible operative room position injury - more likely positional rather than from the surgical dissection.  She says originally the numbness was just above and lateral to the knee but gradually progressed to encompass the lower lateral half of the right thigh.  She has no issues with movement or weakness.    Interval History: Otherwise doing well.  She is eating and drinking and having soft-solid bowel movements.  She admits to intermittent bloating, but doesn't think this is correlated with eating.  She is seeing Paul Mera on Monday, May 23.        Surgical Pathology (2022):  Final Diagnosis   A(1). Left lobe liver lesion, biopsy:  - Unremarkable liver  - No evidence of neuroendocrine tumor     B(2). Right lobe liver lesion, biopsy:  - Unremarkable liver  - No evidence of neuroendocrine tumor     C(3). Terminal ileum and right colon, right hemicolectomy:  - Unremarkable terminal ileum and right colon  - Two of eighteen lymph nodes positive for metastatic neuroendocrine tumor (2/18)       Medications:  Current Outpatient Medications   Medication Sig Dispense Refill     acetaminophen (TYLENOL) 500 MG tablet Take 1 tablet (500 mg) by mouth every 6 hours 90 tablet 0     buPROPion (WELLBUTRIN XL) 150 MG 24 hr tablet [BUPROPION (WELLBUTRIN XL) 150 MG 24 HR TABLET] Take 1 tablet (150 mg total) by mouth daily. (Patient taking differently: Take 150 mg by  mouth every morning) 90 tablet 3     butalbital-acetaminophen-caffeine (ESGIC) -40 MG tablet Take 1 tablet by mouth every 4 hours as needed for headaches 60 tablet 1     ibuprofen (ADVIL/MOTRIN) 400 MG tablet Take 1 tablet (400 mg) by mouth every 6 hours 90 tablet 0     methocarbamol (ROBAXIN) 500 MG tablet Take 1 tablet (500 mg) by mouth 4 times daily as needed for muscle spasms (Patient not taking: Reported on 4/27/2022) 20 tablet 0     oxyCODONE (ROXICODONE) 5 MG tablet Take 1 tablet (5 mg) by mouth every 6 hours as needed for moderate to severe pain (Patient not taking: Reported on 4/27/2022) 20 tablet 0     spironolactone (ALDACTONE) 50 MG tablet        Sulfacetamide Sodium-Sulfur 10-5 % LIQD APPLY TO FACE AND BACK ONCE DAILY       Sulfacetamide Sodium-Sulfur 10-5 % SUSP        tretinoin (RETIN-A) 0.025 % external cream          ROS:  A complete review of systems was performed with the patient and all systems negative except as per HPI.    Physical Examination:  [facility] 03/29/2022   General: Well hydrated. No acute distress.  Abdomen: Soft, NT, surgical incision sites are well healed. No inguinal adenopathy palpated.    ASSESSMENT  22 y/o lady s/p robotic right hemicolectomy for neuroendocrine tumor of the appendix.    Risks, benefits, and alternatives of operative treatment were thoroughly discussed with the patient, he/she understands these well and agrees to proceed.    PLAN  1. Agree with continued follow up with medical oncology.  2. In absence of any abnormality on surveillance imaging I would say normal CRC screening colonoscopy at age 45 years is appropriate  3. No diet or movement restrictions  4. Physical Therapy referral placed for right lateral thigh numbness  5. Follow up as needed with colorectal surgery    30 minutes spent on the date of the encounter doing chart review, history and exam, imaging review, documentation and further activities as noted above.      Jacinto Campbell MD,  PhD    Division of Colon and Rectal Surgery  [facility]    Referring Provider:  Jacinto Campbell MD  [address] [place] [id number]     Primary Care Provider:  Emma Borrero

## 2022-05-19 ENCOUNTER — HOSPITAL ENCOUNTER (OUTPATIENT)
Dept: PHYSICAL THERAPY | Facility: REHABILITATION | Age: 22
Discharge: HOME | End: 2022-05-19
Attending: SURGERY
Payer: COMMERCIAL

## 2022-05-19 ENCOUNTER — OFFICE VISIT (OUTPATIENT)
Dept: SURGERY | Facility: CLINIC | Age: 22
End: 2022-05-19
Payer: COMMERCIAL

## 2022-05-19 VITALS
HEART RATE: 70 BPM | HEIGHT: 63 IN | OXYGEN SATURATION: 96 % | SYSTOLIC BLOOD PRESSURE: 104 MMHG | WEIGHT: 119 LBS | BODY MASS INDEX: 21.09 KG/M2 | DIASTOLIC BLOOD PRESSURE: 72 MMHG

## 2022-05-19 DIAGNOSIS — M79.18 MYOFASCIAL PAIN: Primary | ICD-10-CM

## 2022-05-19 DIAGNOSIS — R20.0 NUMBNESS OF RIGHT ANTERIOR THIGH: ICD-10-CM

## 2022-05-19 DIAGNOSIS — D3A.8 NEUROENDOCRINE NEOPLASM OF APPENDIX (H): Primary | ICD-10-CM

## 2022-05-19 PROCEDURE — 99024 POSTOP FOLLOW-UP VISIT: CPT | Performed by: SURGERY

## 2022-05-19 PROCEDURE — 97140 MANUAL THERAPY 1/> REGIONS: CPT | Mod: GP | Performed by: PHYSICAL THERAPIST

## 2022-05-19 PROCEDURE — 97110 THERAPEUTIC EXERCISES: CPT | Mod: GP | Performed by: PHYSICAL THERAPIST

## 2022-05-19 PROCEDURE — 97161 PT EVAL LOW COMPLEX 20 MIN: CPT | Mod: GP | Performed by: PHYSICAL THERAPIST

## 2022-05-19 ASSESSMENT — PAIN SCALES - GENERAL: PAINLEVEL: NO PAIN (0)

## 2022-05-19 NOTE — NURSING NOTE
"Chief Complaint   Patient presents with     Post-op Visit     Post op, DOS: 3/15/22       Vitals:    05/19/22 0902   BP: 104/72   BP Location: Left arm   Patient Position: Sitting   Cuff Size: Adult Regular   Pulse: 70   SpO2: 96%   Weight: 54 kg (119 lb)   Height: 1.6 m (5' 3\")       Body mass index is 21.08 kg/m .                          Dulce Maria Mills, EMT    "

## 2022-05-19 NOTE — PATIENT INSTRUCTIONS
Follow up:  Physical Therapy Referral: If you have not heard from the scheduling office within 2 business days, please call [phone] for [facility] [place], [phone] for [place] and [phone] for [facility] [place].    SCAR Craig [phone]

## 2022-05-19 NOTE — LETTER
2022       RE: Na De León  [address]   [facility] [id number]     Dear Colleague,    Thank you for referring your patient, Na De León, to the [facility] COLON AND RECTAL SURGERY CLINIC [place] at [facility]. Please see a copy of my visit note below.    Colon and Rectal Surgery Clinic Note    RE: Na De León.  : 2000.  DEVEN: 2022.    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 Surgical Pathology revealed two of eighteen lymph nodes positive for metastatic neuroendocrine tumor. Unfortunately, she has had persistent right thigh numbness. I placed a referral to physical therapy for a possible operative room position injury - more likely positional rather than from the surgical dissection.  She says originally the numbness was just above and lateral to the knee but gradually progressed to encompass the lower lateral half of the right thigh.  She has no issues with movement or weakness.    Interval History: Otherwise doing well.  She is eating and drinking and having soft-solid bowel movements.  She admits to intermittent bloating, but doesn't think this is correlated with eating.  She is seeing Paul Mera on Monday, May 23.        Surgical Pathology (2022):  Final Diagnosis   A(1). Left lobe liver lesion, biopsy:  - Unremarkable liver  - No evidence of neuroendocrine tumor     B(2). Right lobe liver lesion, biopsy:  - Unremarkable liver  - No evidence of neuroendocrine tumor     C(3). Terminal ileum and right colon, right hemicolectomy:  - Unremarkable terminal ileum and right colon  - Two of eighteen lymph nodes positive for metastatic neuroendocrine tumor (2/18)       Medications:  Current  Outpatient Medications   Medication Sig Dispense Refill     acetaminophen (TYLENOL) 500 MG tablet Take 1 tablet (500 mg) by mouth every 6 hours 90 tablet 0     buPROPion (WELLBUTRIN XL) 150 MG 24 hr tablet [BUPROPION (WELLBUTRIN XL) 150 MG 24 HR TABLET] Take 1 tablet (150 mg total) by mouth daily. (Patient taking differently: Take 150 mg by mouth every morning) 90 tablet 3     butalbital-acetaminophen-caffeine (ESGIC) -40 MG tablet Take 1 tablet by mouth every 4 hours as needed for headaches 60 tablet 1     ibuprofen (ADVIL/MOTRIN) 400 MG tablet Take 1 tablet (400 mg) by mouth every 6 hours 90 tablet 0     methocarbamol (ROBAXIN) 500 MG tablet Take 1 tablet (500 mg) by mouth 4 times daily as needed for muscle spasms (Patient not taking: Reported on 4/27/2022) 20 tablet 0     oxyCODONE (ROXICODONE) 5 MG tablet Take 1 tablet (5 mg) by mouth every 6 hours as needed for moderate to severe pain (Patient not taking: Reported on 4/27/2022) 20 tablet 0     spironolactone (ALDACTONE) 50 MG tablet        Sulfacetamide Sodium-Sulfur 10-5 % LIQD APPLY TO FACE AND BACK ONCE DAILY       Sulfacetamide Sodium-Sulfur 10-5 % SUSP        tretinoin (RETIN-A) 0.025 % external cream          ROS:  A complete review of systems was performed with the patient and all systems negative except as per HPI.    Physical Examination:  [facility] 03/29/2022   General: Well hydrated. No acute distress.  Abdomen: Soft, NT, surgical incision sites are well healed. No inguinal adenopathy palpated.    ASSESSMENT  20 y/o lady s/p robotic right hemicolectomy for neuroendocrine tumor of the appendix.    Risks, benefits, and alternatives of operative treatment were thoroughly discussed with the patient, he/she understands these well and agrees to proceed.    PLAN  1. Agree with continued follow up with medical oncology.  2. In absence of any abnormality on surveillance imaging I would say normal CRC screening colonoscopy at age 45 years is appropriate  3. No  diet or movement restrictions  4. Physical Therapy referral placed for right lateral thigh numbness  5. Follow up as needed with colorectal surgery    30 minutes spent on the date of the encounter doing chart review, history and exam, imaging review, documentation and further activities as noted above.      Jacinto Campbell MD, PhD    Division of Colon and Rectal Surgery  [facility]    Referring Provider:  Jacinto Campbell MD  [address] [place] [id number]     Primary Care Provider:  Emma Borrero

## 2022-05-21 NOTE — PROGRESS NOTES
Na is a 21 year old who is being evaluated via a billable video visit.      How would you like to obtain your AVS? MyChart  If the video visit is dropped, the invitation should be resent by: Send to e-mail at: [email or web address]  Will anyone else be joining your video visit? No         Video-Visit Details    Type of service:  Video Visit        Distant Location (provider location):  [facility]          Oncology Follow-up Visit:  May 23, 2022    CANCER DIAGNOSIS: [id number] Well-differentiated neuroendocrine tumor of the appendix, status post appendectomy at [facility] 02/11/2022 with negative margins and a low mitotic index Ki-67 5%.    Treatment to date: initial appendectomy detected the NET; right hemicolectomy completed April 12, 2022.    REFERRING PHYSICIAN:   Jacinto Campbell MD, Colorectal Surgery service, [facility].    Oncology History: She has migraine headaches, as do her 3 sisters and her mother.  These occur and are exacerbated during the time of her menstrual cycle.  Other than that, she had wisdom teeth removed.  She had been having abdominal pain for a period of 2 days as of 02/11/2022.  This led to an emergency room presentation at [facility].  It was felt she might have acute cystitis without hematuria.      Further evaluation was done and included a CT abdomen and pelvis without contrast.  This revealed that there was acute appendicitis without abscess, obstruction or free air or any macroscopic evidence of perforation.  Dr. Bob Aguero, general surgeon at [facility], took the patient to the operating room for appendectomy.  He removed the appendix and there was the discovery of a well-differentiated neuroendocrine tumor, specifically a carcinoid tumor.  It was grade 1/3.  It was 1.7 cm in greatest diameter and the tumor was located 1 cm from the proximal resection margin.  Thus, the resection margins were all  negative.  However, 1 of 2 lymph nodes resected did contain neuroendocrine tumor deposit.  The tumor did invade the serosa. On further histopathologic examination, the mitotic rate was less than 2 mitoses per 2 mm2.  Again, it was well differentiated, grade 1, located in the proximal half of the appendix.  There was no evidence of lymphovascular nor perineural invasion.  Two lymph nodes were taken out in total as described above.  Pathologically, this was described as a T4 N1 lesion.  There is no mention of any perforation seen.  APPENDIX; APPENDECTOMY:  Neuroendocrine tumor (NET):   -Well differentiated - WHO grade 2 out of 3    -Size = 1.7 cm per gross description   -Tumor invades the wall involving the serosa   -Proximal and mesoappendiceal margins uninvolved (negative margins)   -Positive for metastasis to one of two mesoappendiceal lymph nodes (1 /2)   -AJCC/TNM stage: pT4 N1 (tumor with serosal/peritoneal involvement)   -Secondary acute serositis  Comment    The Ki67 index (performed at the [facility] on block A6) shows up to 5% Ki67 proliferation by manual assessment and supplemented by digital analysis on 5900 cells at the hottest spot.       She had subsequent followup including CT abdomen and pelvis and CT chest evaluated at the [facility].      She presented to the [facility] for further followup and met with Dr. Rosalio Campbell from our Colorectal Surgery team on 02/24/2022.    He reviewed her case and she confirms she does not have any carcinoid syndrome-like symptoms prior to or leading up to the diagnosis and surgery.  Specifically, she has not had any heart palpitations or chest pain, facial flushing or profuse or watery diarrhea or frequency of bowel movements.    He reviewed her CT scan and proceeded to discuss the patient's case at our Multidisciplinary Tumor Board.    At that discussion, the recommendation was made to proceed with a right hemicolectomy, particularly due to the finding  of involvement of a lymph node.    DOTATATE scan was done on 03/04/2022. The scan was done on 03/04/2022 and showed no evidence of distant metastatic disease.  There were some benign physiologic activity of uptake in the pancreatic uncinate process but not indicative of any evidence of metastatic disease from the neuroendocrine tumor.   IMPRESSION: In this patient with history of appendectomy, found to  have well differentiated neuroendocrine tumor:  1. No abnormal Dotatate activity in the remainder of the body to  suggest metastatic disease.  2. Dotatate activity in the approximate location of the pancreatic  uncinate process is benign physiologic activity (It is normal to have  overexpression of Somatostatin 2 receptors in the uncinate process.)     Chromogranin A was checked on 02/18/2022 in the postoperative setting and was 48.  It was about a week out from the surgery, normal range at that institution being 0-103.  CEA was unremarkable at less than 0.5.      A staging DOTATATE scan is as noted above.     April 12 2022--right hemicolectomy   Specimens:   A) - Liver, Left lobe liver lesion                                                                   B) - Liver, Right lobe liver lesion                                                                  C) - Large Intestine, Colon, Right colon terminal ileum                                    Final Diagnosis   A(1). Left lobe liver lesion, biopsy:  - Unremarkable liver  - No evidence of neuroendocrine tumor     B(2). Right lobe liver lesion, biopsy:  - Unremarkable liver  - No evidence of neuroendocrine tumor     C(3). Terminal ileum and right colon, right hemicolectomy:  - Unremarkable terminal ileum and right colon  - Two of eighteen lymph nodes positive for metastatic neuroendocrine tumor (2/18)       May 21, 2022 -- oncology follow-up/virtual visit, Dr. Mera.          Interim History/History Of Present Illness:  Ms. 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 She has a diagnosis of well-differentiated neuroendocrine tumor, carcinoid subtype, of the appendix.    VIRTUAL VIDEO VISIT    She joined me today via [employer]-based virtual video visit from home.  She is accompanied by her parents and I last met her a few months ago upon the kind recommendation of Dr. Campbell after appendectomy revealed that she had a neuroendocrine tumor that was well-differentiated, grade 2 with a Ki-67 of 5%.  She met with Dr. Campbell from the Colorectal Surgery team at end of February.  I subsequently met with her after getting a DOTATATE scan done.  It was a staging scan and did not show any evidence of metastatic disease to distant sites.      Right hemicolectomy was recommended.  She saw  Dr. Castaneda for further consultation at the [facility] in early March.  Recommendation there was also right hemicolectomy primarily due to the lymph node sampling on the initial appendectomy that showed positive lymph node.  She had a right hemicolectomy here with Dr. aCmpbell at the [facility] 04/12.  He sampled some lesions in the liver that were unremarkable and not involving neuroendocrine tumor.  Two out of the eighteen lymph nodes residual ended up showing neuroendocrine tumor and were similar in nature to the initial biopsy.  She has recovered generally well.  She and her parents had a number of questions today regarding the pathology report and the path forward.          Review Of Systems:  Comprehensive (14-point) ROS reviewed. Pertinent symptoms reviewed above per HPI.      Past medical, social, surgical, and family histories reviewed.    PAST MEDICAL AND SURGICAL HISTORY:  Only notable for migraine headaches that her mother states that she has herself and also Na's 3 sisters.  For Na herself, she endorses that it happens and is exacerbated during menstrual cycles.  She has had wisdom teeth removed.   Otherwise, no significant surgeries aside from most obviously the appendectomy 2022 performed by Dr. Bob Aguero at [facility] as noted above, diagnostic of a well-differentiated carcinoid tumor of the appendix.    FAMILY HISTORY:  Family history of malignancy includes her father who was on the call today who had prostate cancer at 55.  He is now 58 and sounds to be doing well.  Paternal grandfather had prostate cancer at age 75 and  at 86 of unrelated causes.  Mother had basal cell carcinoma of the skin.  She has 3 sisters, 2 older, 1 younger.  All are in good health with no evidence of cancer.  There are no other familial germline mutations known.    SOCIAL HISTORY:  Na is staying currently with her parents in [place], [place].  She attends [employer].  She is majoring in business.  She is in the class of .        Allergies:  Allergies as of 2022 - Reviewed 2022   Allergen Reaction Noted     Grass extracts [gramineae pollens]  2017     Other environmental allergy Unknown 2017       Current Medications:  Current Outpatient Medications   Medication Sig Dispense Refill     acetaminophen (TYLENOL) 500 MG tablet Take 1 tablet (500 mg) by mouth every 6 hours 90 tablet 0     buPROPion (WELLBUTRIN XL) 150 MG 24 hr tablet [BUPROPION (WELLBUTRIN XL) 150 MG 24 HR TABLET] Take 1 tablet (150 mg total) by mouth daily. (Patient taking differently: Take 150 mg by mouth every morning) 90 tablet 3     butalbital-acetaminophen-caffeine (ESGIC) -40 MG tablet Take 1 tablet by mouth every 4 hours as needed for headaches 60 tablet 1     ibuprofen (ADVIL/MOTRIN) 400 MG tablet Take 1 tablet (400 mg) by mouth every 6 hours 90 tablet 0     methocarbamol (ROBAXIN) 500 MG tablet Take 1 tablet (500 mg) by mouth 4 times daily as needed for muscle spasms (Patient not taking: No sig reported) 20 tablet 0     oxyCODONE (ROXICODONE) 5 MG tablet Take 1 tablet (5 mg) by mouth every 6 hours as  needed for moderate to severe pain (Patient not taking: No sig reported) 20 tablet 0     spironolactone (ALDACTONE) 50 MG tablet        Sulfacetamide Sodium-Sulfur 10-5 % LIQD APPLY TO FACE AND BACK ONCE DAILY (Patient not taking: Reported on 5/19/2022)       Sulfacetamide Sodium-Sulfur 10-5 % SUSP  (Patient not taking: Reported on 5/19/2022)       tretinoin (RETIN-A) 0.025 % external cream  (Patient not taking: Reported on 5/19/2022)          Physical Exam:  In-person physical exam could not be performed today in context of a Virtual Visit. Observed physical assessments made today by visualizing the patient by video link:  Vitals - Patient Reported  Pain Score: No Pain (0)             General/Constitutional: Generally appears well, not acutely ill.  HEENT: no scleral icterus, not jaundiced.  Respiratory: no labored breathing.  Musculoskeletal: appears to have full range of motion and adequate physical strength.  Skin: no jaundice, discoloration or other notable lesions.  Neurological: no evidence of tremors.  Psychiatric: no evident anxiety; fully alert and oriented with fluent speech.      The rest of a comprehensive physical examination is deferred due to nature of video visits.      Laboratory/Imaging Studies  No visits with results within 2 Week(s) from this visit.   Latest known visit with results is:   Lab on 04/27/2022   Component Date Value Ref Range Status     Hemoglobin 04/27/2022 10.8 (A) 11.7 - 15.7 g/dL Final        ASSESSMENT/PLAN:  Ms. Na De León is a 21-year-old woman with no significant past medical history or any family history of malignancy that I would align with MEN1 syndrome or anything else related to NET.  She has a diagnosis of a neuroendocrine tumor, specifically well-differentiated carcinoid tumor of the appendix with a low mitotic rate.  It was removed during appendectomy when she initially presented with appendicitis on 02/11/2022.  It was fully resected with negative margins;  however, although it was under 2 cm in size, specifically 1.7 cm, there was 1 of 2 lymph nodes involved with malignancy.  For that reason, a right hemicolectomy was recommended, both for further therapy and for full pathologic staging of the tumor.       She underwent a completion right hemicolectomy 04/12/2022. Overall, she is 5-6 weeks out from that surgery, and she is generally healing well.  The histopathology examination showed 2/18 lymph nodes were involved with metastatic neuroendocrine tumor, and 16/18 were not.  Her mother in particular asked numerous questions regarding the implications for that and the potential for recurrence.  She also asked about any potential relation or association with food or other behaviors, including symptoms of migraine headaches that Na herself has had as well as her mother and sisters for many times.  I could not think of any association that a neuroendocrine tumor would have with migraine headaches, though I am glad to hear Na's migraine headaches have improved.      The fact that her mother and sisters also had this make it very much less likely that there is any association, causation or correlation with the neuroendocrine tumor that was found. At this point per standard-of-care NCCN and ASCO guidelines, I recommend active surveillance in the absence of any symptoms or any higher-grade form of disease.  I do not see any role for somatostatin analogue.  I suggested active surveillance per standard guidelines with a 6 month MRI with 6 months being retroactive to date of surgery, which is 04/12.      They gave verbal consent in moving forward in scheduling this.  I also suggested a Cancer Genetics evaluation for evaluation of MEN1-2, considering her young age at the time of diagnosis of a neuroendocrine tumor. They would like to think about that, and they will let us know if they want to go forward.  Her mother does detail that she has a half sister through her mother  who did have some kind of colorectal carcinoma or other form of tumor.  If they meet with Cancer Genetics, I would encourage them to go into any pertinent family medical history in further detail.  I reviewed good nutrition and abstaining from or minimizing alcohol, and also abstaining from tobacco or drugs would be prudent, and living an active than rather sedentary lifestyle might be ways to improve long-term health overall. I answered their questions to the best of my ability today.        VIRTUAL VISIT - DETAILS:    I have reviewed the note as documented above. This accurately captures the substance of my conversation with the patient.    Date of call: May 21, 2022   Start of call: 10:15 am  End of call: 10:38 am    Provider location: [facility] (academic office)  Patient location: Home      Mode of Video Visit: Amwell           I spent 23 minutes in consultation, including history and discussion with the patient including review of recent lab values and radiologic imaging results.  An additional 10 minutes was spent on the day of the visit, including reviewing pertinent medical notes and documentation from other physicians and APPs who have evaluated and treated this patient, pertinent lab values, pathology and imaging results, personal review of radiologic images, discussing the case with referring providers and/or nurse coordinator team, post-visit orders, and all post-visit documentation.    Paul Mera MD PhD        The above was transcribed using a voice recognition software.  While I reviewed and edited the transcription, I may miss errors.  Please let me know of any of serious errors and I will addend the note.

## 2022-05-23 ENCOUNTER — VIRTUAL VISIT (OUTPATIENT)
Dept: ONCOLOGY | Facility: CLINIC | Age: 22
End: 2022-05-23
Attending: INTERNAL MEDICINE
Payer: COMMERCIAL

## 2022-05-23 DIAGNOSIS — C7A.8 PRIMARY MALIGNANT NEUROENDOCRINE TUMOR OF APPENDIX (H): Primary | ICD-10-CM

## 2022-05-23 PROCEDURE — 99214 OFFICE O/P EST MOD 30 MIN: CPT | Mod: 95 | Performed by: INTERNAL MEDICINE

## 2022-05-23 PROCEDURE — G0463 HOSPITAL OUTPT CLINIC VISIT: HCPCS | Mod: PN,RTG | Performed by: INTERNAL MEDICINE

## 2022-05-23 NOTE — PROGRESS NOTES
05/19/22 1500   General Information   Type of Visit Initial OP Ortho PT Evaluation   Start of Care Date 05/19/22   Referring Physician Jacinto Chery MD   Date of Order 05/10/22   Certification Required? No   Medical Diagnosis R thigh numbness   Body Part(s)   Body Part(s) Lumbar Spine/SI   Presentation and Etiology   Pertinent history of current problem (include personal factors and/or comorbidities that impact the POC) She did have surgery on 4/12 to remove a tumor (neuroendocrine tumor) and did require remove her ascending colon. About a week later there was some numbness around her knee and that area has since grown to a larger patch on her thigh. It doesn't really seem to stop her from doing anything but is very annoying for her to deal with. Sleep isn't disturbed for her. She denies having changes in bowel/bladder patterns. she is now off of restrictions as of today. Her incision is healing well adn nearly all scabs are gone.   How/Where did it occur Other   Onset date of current episode/exacerbation 04/17/22   Chronicity New   Pain rating (0-10 point scale) Best (/10);Worst (/10)   Best (/10) 0   Worst (/10) 3   Pain quality H. Other   Pain quality comment numbness   Pain/symptoms eased by D. Nothing   Progression of symptoms since onset: Worsened   Fall Risk Screen   Fall screen completed by PT   Have you fallen 2 or more times in the past year? No   Have you fallen and had an injury in the past year? No   Is patient a fall risk? No   Abuse Screen (yes response referral indicated)   Feels Unsafe at Home or Work/School no   Feels Threatened by Someone no   Does Anyone Try to Keep You From Having Contact with Others or Doing Things Outside Your Home? no   Physical Signs of Abuse Present no   Patient needs abuse support services and resources No   System Outcome Measures   Outcome Measures   (LEFS: 0)   Lumbar Spine/SI Objective Findings   Gait/Locomotion mostly unremarkable.   Flexion ROM WFL    Extension ROM WFL   Hip Screen negative B   Hip Flexion (L2) Strength 5 B   Knee Flexion Strength 5 B   Knee Extension (L3) Strength 5 B   SLR WFL B   Palpation Tightness noted over incision sites in her abdomen and into middle abdomen.   Slump Test negative B   Planned Therapy Interventions   Planned Therapy Interventions stretching;strengthening;ROM;neuromuscular re-education;manual therapy   Clinical Impression   Criteria for Skilled Therapeutic Interventions Met yes, treatment indicated   PT Diagnosis R thigh numbness, myofascial pain   Clinical Presentation Stable/Uncomplicated   Clinical Decision Making (Complexity) Low complexity   Therapy Frequency 1 time/week   Predicted Duration of Therapy Intervention (days/wks) 90 days   Risk & Benefits of therapy have been explained Yes   Patient, Family & other staff in agreement with plan of care Yes   Clinical Impression Comments Pt is a 20 y/o female presenting with R thigh numbness s/p neuroendocrine tumor removal and ascending colon removal on 4/12/22. Her distribution of numbness does seem to be the descending branch of her lateral femoral circumflex vessels. She is also very tight over her incision sites but we are going to hold off on mobilizing these until they are fully healed. She is appropriate for skilled PT to reach all stated goals.   ORTHO GOALS   PT Ortho Eval Goals 1;2;3   Ortho Goal 1   Goal Identifier 1   Goal Description Pt will increase LEFS by 40 points (from 0) to show improved function in 90 days.   Target Date 08/17/22   Ortho Goal 2   Goal Identifier 2   Goal Description Pt will verbalize pain of 0/10 with all ADLs in 90 days.   Target Date 08/17/22   Ortho Goal 3   Goal Identifier 3   Goal Description Pt will verbalize no numbness in her leg with daily activities in 90 days.   Target Date 08/17/22   Total Evaluation Time   PT Eval, Low Complexity Minutes ([id number]) 25

## 2022-05-23 NOTE — LETTER
5/23/2022         RE: Na De León  [address]   [place] [facility] [place] [facility] [address]        Dear Colleague,    Thank you for referring your patient, Na De León, to the [facility]. Please see a copy of my visit note below.    Oncology Follow-up Visit:  May 23, 2022    CANCER DIAGNOSIS: [id number] Well-differentiated neuroendocrine tumor of the appendix, status post appendectomy at [facility] 02/11/2022 with negative margins and a low mitotic index Ki-67 5%.    Treatment to date: initial appendectomy detected the NET; right hemicolectomy completed April 12, 2022.    REFERRING PHYSICIAN:   Jacinto Campbell MD, Colorectal Surgery service, [facility].    Oncology History: She has migraine headaches, as do her 3 sisters and her mother.  These occur and are exacerbated during the time of her menstrual cycle.  Other than that, she had wisdom teeth removed.  She had been having abdominal pain for a period of 2 days as of 02/11/2022.  This led to an emergency room presentation at [facility].  It was felt she might have acute cystitis without hematuria.      Further evaluation was done and included a CT abdomen and pelvis without contrast.  This revealed that there was acute appendicitis without abscess, obstruction or free air or any macroscopic evidence of perforation.  Dr. Bob Aguero, general surgeon at [facility], took the patient to the operating room for appendectomy.  He removed the appendix and there was the discovery of a well-differentiated neuroendocrine tumor, specifically a carcinoid tumor.  It was grade 1/3.  It was 1.7 cm in greatest diameter and the tumor was located 1 cm from the proximal resection margin.  Thus, the resection margins were all negative.  However, 1 of 2 lymph nodes resected did contain neuroendocrine tumor deposit.  The tumor did invade the serosa. On further histopathologic examination, the mitotic rate was  less than 2 mitoses per 2 mm2.  Again, it was well differentiated, grade 1, located in the proximal half of the appendix.  There was no evidence of lymphovascular nor perineural invasion.  Two lymph nodes were taken out in total as described above.  Pathologically, this was described as a T4 N1 lesion.  There is no mention of any perforation seen.  APPENDIX; APPENDECTOMY:  Neuroendocrine tumor (NET):   -Well differentiated - WHO grade 2 out of 3    -Size = 1.7 cm per gross description   -Tumor invades the wall involving the serosa   -Proximal and mesoappendiceal margins uninvolved (negative margins)   -Positive for metastasis to one of two mesoappendiceal lymph nodes (1 /2)   -AJCC/TNM stage: pT4 N1 (tumor with serosal/peritoneal involvement)   -Secondary acute serositis  Comment    The Ki67 index (performed at the [facility] on block A6) shows up to 5% Ki67 proliferation by manual assessment and supplemented by digital analysis on 5900 cells at the hottest spot.       She had subsequent followup including CT abdomen and pelvis and CT chest evaluated at the [facility].      She presented to the [facility] for further followup and met with Dr. Rosalio Campbell from our Colorectal Surgery team on 02/24/2022.    He reviewed her case and she confirms she does not have any carcinoid syndrome-like symptoms prior to or leading up to the diagnosis and surgery.  Specifically, she has not had any heart palpitations or chest pain, facial flushing or profuse or watery diarrhea or frequency of bowel movements.    He reviewed her CT scan and proceeded to discuss the patient's case at our Multidisciplinary Tumor Board.    At that discussion, the recommendation was made to proceed with a right hemicolectomy, particularly due to the finding of involvement of a lymph node.    DOTATATE scan was done on 03/04/2022. The scan was done on 03/04/2022 and showed no evidence of distant metastatic disease.  There were some benign  physiologic activity of uptake in the pancreatic uncinate process but not indicative of any evidence of metastatic disease from the neuroendocrine tumor.   IMPRESSION: In this patient with history of appendectomy, found to  have well differentiated neuroendocrine tumor:  1. No abnormal Dotatate activity in the remainder of the body to  suggest metastatic disease.  2. Dotatate activity in the approximate location of the pancreatic  uncinate process is benign physiologic activity (It is normal to have  overexpression of Somatostatin 2 receptors in the uncinate process.)     Chromogranin A was checked on 02/18/2022 in the postoperative setting and was 48.  It was about a week out from the surgery, normal range at that institution being 0-103.  CEA was unremarkable at less than 0.5.      A staging DOTATATE scan is as noted above.     April 12 2022--right hemicolectomy   Specimens:   A) - Liver, Left lobe liver lesion                                                                   B) - Liver, Right lobe liver lesion                                                                  C) - Large Intestine, Colon, Right colon terminal ileum                                    Final Diagnosis   A(1). Left lobe liver lesion, biopsy:  - Unremarkable liver  - No evidence of neuroendocrine tumor     B(2). Right lobe liver lesion, biopsy:  - Unremarkable liver  - No evidence of neuroendocrine tumor     C(3). Terminal ileum and right colon, right hemicolectomy:  - Unremarkable terminal ileum and right colon  - Two of eighteen lymph nodes positive for metastatic neuroendocrine tumor (2/18)       May 21, 2022 -- oncology follow-up/virtual visit, Dr. Mera.          Interim History/History Of Present Illness:  Ms. Na De León is a 21-year-old college student who goes to college in [employer], class of 2023. She has a diagnosis of well-differentiated neuroendocrine tumor, carcinoid subtype, of the appendix.    VIRTUAL  VIDEO VISIT    She joined me today via [facility]-based virtual video visit from home.  She is accompanied by her parents and I last met her a few months ago upon the kind recommendation of Dr. Campbell after appendectomy revealed that she had a neuroendocrine tumor that was well-differentiated, grade 2 with a Ki-67 of 5%.  She met with Dr. Campbell from the Colorectal Surgery team at end of February.  I subsequently met with her after getting a DOTATATE scan done.  It was a staging scan and did not show any evidence of metastatic disease to distant sites.      Right hemicolectomy was recommended.  She saw  Dr. Castaneda for further consultation at the [facility] in early March.  Recommendation there was also right hemicolectomy primarily due to the lymph node sampling on the initial appendectomy that showed positive lymph node.  She had a right hemicolectomy here with Dr. Campbell at the [facility] 04/12.  He sampled some lesions in the liver that were unremarkable and not involving neuroendocrine tumor.  Two out of the eighteen lymph nodes residual ended up showing neuroendocrine tumor and were similar in nature to the initial biopsy.  She has recovered generally well.  She and her parents had a number of questions today regarding the pathology report and the path forward.          Review Of Systems:  Comprehensive (14-point) ROS reviewed. Pertinent symptoms reviewed above per HPI.      Past medical, social, surgical, and family histories reviewed.    PAST MEDICAL AND SURGICAL HISTORY:  Only notable for migraine headaches that her mother states that she has herself and also Na's 3 sisters.  For Na herself, she endorses that it happens and is exacerbated during menstrual cycles.  She has had wisdom teeth removed.  Otherwise, no significant surgeries aside from most obviously the appendectomy 02/11/2022 performed by Dr. Bob Aguero at [facility] as noted above, diagnostic of a  well-differentiated carcinoid tumor of the appendix.    FAMILY HISTORY:  Family history of malignancy includes her father who was on the call today who had prostate cancer at 55.  He is now 58 and sounds to be doing well.  Paternal grandfather had prostate cancer at age 75 and  at 86 of unrelated causes.  Mother had basal cell carcinoma of the skin.  She has 3 sisters, 2 older, 1 younger.  All are in good health with no evidence of cancer.  There are no other familial germline mutations known.    SOCIAL HISTORY:  Na is staying currently with her parents in [place], [place].  She attends [employer].  She is majoring in business.  She is in the class of .        Allergies:  Allergies as of 2022 - Reviewed 2022   Allergen Reaction Noted     Grass extracts [gramineae pollens]  2017     Other environmental allergy Unknown 2017       Current Medications:  Current Outpatient Medications   Medication Sig Dispense Refill     acetaminophen (TYLENOL) 500 MG tablet Take 1 tablet (500 mg) by mouth every 6 hours 90 tablet 0     buPROPion (WELLBUTRIN XL) 150 MG 24 hr tablet [BUPROPION (WELLBUTRIN XL) 150 MG 24 HR TABLET] Take 1 tablet (150 mg total) by mouth daily. (Patient taking differently: Take 150 mg by mouth every morning) 90 tablet 3     butalbital-acetaminophen-caffeine (ESGIC) -40 MG tablet Take 1 tablet by mouth every 4 hours as needed for headaches 60 tablet 1     ibuprofen (ADVIL/MOTRIN) 400 MG tablet Take 1 tablet (400 mg) by mouth every 6 hours 90 tablet 0     methocarbamol (ROBAXIN) 500 MG tablet Take 1 tablet (500 mg) by mouth 4 times daily as needed for muscle spasms (Patient not taking: No sig reported) 20 tablet 0     oxyCODONE (ROXICODONE) 5 MG tablet Take 1 tablet (5 mg) by mouth every 6 hours as needed for moderate to severe pain (Patient not taking: No sig reported) 20 tablet 0     spironolactone (ALDACTONE) 50 MG tablet        Sulfacetamide Sodium-Sulfur 10-5  % LIQD APPLY TO FACE AND BACK ONCE DAILY (Patient not taking: Reported on 5/19/2022)       Sulfacetamide Sodium-Sulfur 10-5 % SUSP  (Patient not taking: Reported on 5/19/2022)       tretinoin (RETIN-A) 0.025 % external cream  (Patient not taking: Reported on 5/19/2022)          Physical Exam:  In-person physical exam could not be performed today in context of a Virtual Visit. Observed physical assessments made today by visualizing the patient by video link:  Vitals - Patient Reported  Pain Score: No Pain (0)             General/Constitutional: Generally appears well, not acutely ill.  HEENT: no scleral icterus, not jaundiced.  Respiratory: no labored breathing.  Musculoskeletal: appears to have full range of motion and adequate physical strength.  Skin: no jaundice, discoloration or other notable lesions.  Neurological: no evidence of tremors.  Psychiatric: no evident anxiety; fully alert and oriented with fluent speech.      The rest of a comprehensive physical examination is deferred due to nature of video visits.      Laboratory/Imaging Studies  No visits with results within 2 Week(s) from this visit.   Latest known visit with results is:   Lab on 04/27/2022   Component Date Value Ref Range Status     Hemoglobin 04/27/2022 10.8 (A) 11.7 - 15.7 g/dL Final        ASSESSMENT/PLAN:  Ms. Na De León is a 21-year-old woman with no significant past medical history or any family history of malignancy that I would align with MEN1 syndrome or anything else related to NET.  She has a diagnosis of a neuroendocrine tumor, specifically well-differentiated carcinoid tumor of the appendix with a low mitotic rate.  It was removed during appendectomy when she initially presented with appendicitis on 02/11/2022.  It was fully resected with negative margins; however, although it was under 2 cm in size, specifically 1.7 cm, there was 1 of 2 lymph nodes involved with malignancy.  For that reason, a right hemicolectomy was  recommended, both for further therapy and for full pathologic staging of the tumor.       She underwent a completion right hemicolectomy 04/12/2022. Overall, she is 5-6 weeks out from that surgery, and she is generally healing well.  The histopathology examination showed 2/18 lymph nodes were involved with metastatic neuroendocrine tumor, and 16/18 were not.  Her mother in particular asked numerous questions regarding the implications for that and the potential for recurrence.  She also asked about any potential relation or association with food or other behaviors, including symptoms of migraine headaches that Na herself has had as well as her mother and sisters for many times.  I could not think of any association that a neuroendocrine tumor would have with migraine headaches, though I am glad to hear Na's migraine headaches have improved.      The fact that her mother and sisters also had this make it very much less likely that there is any association, causation or correlation with the neuroendocrine tumor that was found. At this point per standard-of-care NCCN and ASCO guidelines, I recommend active surveillance in the absence of any symptoms or any higher-grade form of disease.  I do not see any role for somatostatin analogue.  I suggested active surveillance per standard guidelines with a 6 month MRI with 6 months being retroactive to date of surgery, which is 04/12.      They gave verbal consent in moving forward in scheduling this.  I also suggested a Cancer Genetics evaluation for evaluation of MEN1-2, considering her young age at the time of diagnosis of a neuroendocrine tumor. They would like to think about that, and they will let us know if they want to go forward.  Her mother does detail that she has a half sister through her mother who did have some kind of colorectal carcinoma or other form of tumor.  If they meet with Cancer Genetics, I would encourage them to go into any pertinent family  medical history in further detail.  I reviewed good nutrition and abstaining from or minimizing alcohol, and also abstaining from tobacco or drugs would be prudent, and living an active than rather sedentary lifestyle might be ways to improve long-term health overall. I answered their questions to the best of my ability today.    I spent 23 minutes in consultation, including history and discussion with the patient including review of recent lab values and radiologic imaging results.  An additional 10 minutes was spent on the day of the visit, including reviewing pertinent medical notes and documentation from other physicians and APPs who have evaluated and treated this patient, pertinent lab values, pathology and imaging results, personal review of radiologic images, discussing the case with referring providers and/or nurse coordinator team, post-visit orders, and all post-visit documentation.      Paul Mera MD PhD      The above was transcribed using a voice recognition software.  While I reviewed and edited the transcription, I may miss errors.  Please let me know of any of serious errors and I will addend the note.

## 2022-05-24 ENCOUNTER — HOSPITAL ENCOUNTER (OUTPATIENT)
Dept: PHYSICAL THERAPY | Facility: REHABILITATION | Age: 22
Discharge: HOME | End: 2022-05-24
Payer: COMMERCIAL

## 2022-05-24 DIAGNOSIS — R20.0 NUMBNESS OF RIGHT ANTERIOR THIGH: Primary | ICD-10-CM

## 2022-05-24 DIAGNOSIS — M79.18 MYOFASCIAL PAIN: ICD-10-CM

## 2022-05-24 PROCEDURE — 97110 THERAPEUTIC EXERCISES: CPT | Mod: GP | Performed by: PHYSICAL THERAPIST

## 2022-05-24 PROCEDURE — 97140 MANUAL THERAPY 1/> REGIONS: CPT | Mod: GP | Performed by: PHYSICAL THERAPIST

## 2022-06-17 ENCOUNTER — HOSPITAL ENCOUNTER (OUTPATIENT)
Dept: PHYSICAL THERAPY | Facility: REHABILITATION | Age: 22
Discharge: HOME | End: 2022-06-17
Payer: COMMERCIAL

## 2022-06-17 DIAGNOSIS — M79.18 MYOFASCIAL PAIN: ICD-10-CM

## 2022-06-17 DIAGNOSIS — R20.0 NUMBNESS OF RIGHT ANTERIOR THIGH: Primary | ICD-10-CM

## 2022-06-17 PROCEDURE — 97110 THERAPEUTIC EXERCISES: CPT | Mod: GP | Performed by: PHYSICAL THERAPIST

## 2022-06-17 PROCEDURE — 97140 MANUAL THERAPY 1/> REGIONS: CPT | Mod: GP | Performed by: PHYSICAL THERAPIST

## 2022-08-01 ENCOUNTER — HOSPITAL ENCOUNTER (OUTPATIENT)
Dept: PHYSICAL THERAPY | Facility: REHABILITATION | Age: 22
Discharge: HOME | End: 2022-08-01
Payer: COMMERCIAL

## 2022-08-01 DIAGNOSIS — M79.18 MYOFASCIAL PAIN: ICD-10-CM

## 2022-08-01 DIAGNOSIS — R20.0 NUMBNESS OF RIGHT ANTERIOR THIGH: Primary | ICD-10-CM

## 2022-08-01 PROCEDURE — 97140 MANUAL THERAPY 1/> REGIONS: CPT | Mod: GP | Performed by: PHYSICAL THERAPIST

## 2022-08-01 PROCEDURE — 97110 THERAPEUTIC EXERCISES: CPT | Mod: GP | Performed by: PHYSICAL THERAPIST

## 2022-08-08 ENCOUNTER — HOSPITAL ENCOUNTER (OUTPATIENT)
Dept: PHYSICAL THERAPY | Facility: REHABILITATION | Age: 22
Discharge: HOME | End: 2022-08-08
Payer: COMMERCIAL

## 2022-08-08 DIAGNOSIS — M79.18 MYOFASCIAL PAIN: ICD-10-CM

## 2022-08-08 DIAGNOSIS — R20.0 NUMBNESS OF RIGHT ANTERIOR THIGH: Primary | ICD-10-CM

## 2022-08-08 PROCEDURE — 97110 THERAPEUTIC EXERCISES: CPT | Mod: GP | Performed by: PHYSICAL THERAPIST

## 2022-08-08 PROCEDURE — 97140 MANUAL THERAPY 1/> REGIONS: CPT | Mod: GP | Performed by: PHYSICAL THERAPIST

## 2022-08-09 PROBLEM — C77.9 MALIGNANT NEOPLASM METASTATIC TO LYMPH NODES (H): Status: ACTIVE | Noted: 2022-03-09

## 2022-08-09 PROBLEM — D3A.020 CARCINOID TUMOR OF APPENDIX (H): Status: ACTIVE | Noted: 2022-03-09

## 2022-08-10 ENCOUNTER — OFFICE VISIT (OUTPATIENT)
Dept: FAMILY MEDICINE | Facility: CLINIC | Age: 22
End: 2022-08-10
Payer: COMMERCIAL

## 2022-08-10 VITALS
OXYGEN SATURATION: 98 % | BODY MASS INDEX: 21.48 KG/M2 | HEIGHT: 63 IN | HEART RATE: 80 BPM | RESPIRATION RATE: 16 BRPM | WEIGHT: 121.2 LBS | DIASTOLIC BLOOD PRESSURE: 78 MMHG | SYSTOLIC BLOOD PRESSURE: 100 MMHG | TEMPERATURE: 97.6 F

## 2022-08-10 DIAGNOSIS — Z11.3 SCREENING FOR STDS (SEXUALLY TRANSMITTED DISEASES): ICD-10-CM

## 2022-08-10 DIAGNOSIS — N89.8 VAGINAL DISCHARGE: ICD-10-CM

## 2022-08-10 DIAGNOSIS — Z00.00 ROUTINE ADULT HEALTH MAINTENANCE: Primary | ICD-10-CM

## 2022-08-10 DIAGNOSIS — Z76.89 ESTABLISHING CARE WITH NEW DOCTOR, ENCOUNTER FOR: ICD-10-CM

## 2022-08-10 DIAGNOSIS — Z12.4 CERVICAL CANCER SCREENING: ICD-10-CM

## 2022-08-10 LAB
CLUE CELLS: PRESENT
TRICHOMONAS, WET PREP: ABNORMAL
WBC'S/HIGH POWER FIELD, WET PREP: ABNORMAL
YEAST, WET PREP: ABNORMAL

## 2022-08-10 PROCEDURE — G0124 SCREEN C/V THIN LAYER BY MD: HCPCS | Performed by: PATHOLOGY

## 2022-08-10 PROCEDURE — G0145 SCR C/V CYTO,THINLAYER,RESCR: HCPCS | Performed by: FAMILY MEDICINE

## 2022-08-10 PROCEDURE — 99395 PREV VISIT EST AGE 18-39: CPT | Performed by: FAMILY MEDICINE

## 2022-08-10 PROCEDURE — 87591 N.GONORRHOEAE DNA AMP PROB: CPT | Performed by: FAMILY MEDICINE

## 2022-08-10 PROCEDURE — 87210 SMEAR WET MOUNT SALINE/INK: CPT | Performed by: FAMILY MEDICINE

## 2022-08-10 PROCEDURE — 87491 CHLMYD TRACH DNA AMP PROBE: CPT | Performed by: FAMILY MEDICINE

## 2022-08-10 RX ORDER — METRONIDAZOLE 500 MG/1
500 TABLET ORAL 2 TIMES DAILY
Qty: 14 TABLET | Refills: 0 | Status: SHIPPED | OUTPATIENT
Start: 2022-08-10 | End: 2022-08-17

## 2022-08-10 ASSESSMENT — ENCOUNTER SYMPTOMS
NAUSEA: 0
DIARRHEA: 0
EYE PAIN: 0
CHILLS: 0
FREQUENCY: 0
ABDOMINAL PAIN: 0
SORE THROAT: 0
DIZZINESS: 0
COUGH: 0
NERVOUS/ANXIOUS: 0
PALPITATIONS: 0
HEARTBURN: 0
JOINT SWELLING: 0
DYSURIA: 0
SHORTNESS OF BREATH: 0
WEAKNESS: 0
PARESTHESIAS: 0
BREAST MASS: 0
HEMATOCHEZIA: 0
ARTHRALGIAS: 0
HEMATURIA: 0
FEVER: 0
HEADACHES: 0
CONSTIPATION: 0
MYALGIAS: 0

## 2022-08-10 ASSESSMENT — PAIN SCALES - GENERAL: PAINLEVEL: NO PAIN (0)

## 2022-08-10 NOTE — PROGRESS NOTES
Assessment/Plan:   Na is a 21 year old female here for physical     Routine adult health maintenance  Physical completed today.  Labs as below.  Up-to-date with immunizations.  Pap smear completed today.    Establishing care with new doctor, encounter for  Establishing care today, all past medical history reviewed and updated in EMR.    Screening for STDs (sexually transmitted diseases)  Patient agrees to gonorrhea and chlamydia screening today.  - NEISSERIA GONORRHOEA PCR  - CHLAMYDIA TRACHOMATIS PCR    Cervical cancer screening  Due for Pap smear, completed today.  - Pap Screen only - recommended age 21 - 24 years    Vaginal discharge  Vaginal discharge noted on exam, wet prep obtained and will treat if indicated.  - Wet prep - Clinic Collect       I have had an Advance Directives discussion with the patient.    Follow up: 1 year for physical, sooner as needed    Frances Don MD  [facility]      Subjective:     Na De León is a 21 year old female who presents for an annual exam.     Answers for HPI/ROS submitted by the patient on 8/10/2022  Frequency of exercise:: 1 day/week  Getting at least 3 servings of Calcium per day:: Yes  Diet:: Regular (no restrictions)  Taking medications regularly:: Yes  Medication side effects:: None  Bi-annual eye exam:: NO  Dental care twice a year:: Yes  Sleep apnea or symptoms of sleep apnea:: None  abdominal pain: No  Blood in stool: No  Blood in urine: No  chest pain: No  chills: No  congestion: No  constipation: No  cough: No  diarrhea: No  dizziness: No  ear pain: No  eye pain: No  nervous/anxious: No  fever: No  frequency: No  genital sores: No  headaches: No  hearing loss: No  heartburn: No  arthralgias: No  joint swelling: No  peripheral edema: No  mood changes: No  myalgias: No  nausea: No  dysuria: No  palpitations: No  Skin sensation changes: No  sore throat: No  urgency: No  rash: No  shortness of breath: No  visual disturbance: No  weakness:  No  pelvic pain: No  vaginal bleeding: No  vaginal discharge: No  tenderness: No  breast mass: No  breast discharge: No  Additional concerns today:: Yes  Duration of exercise:: Less than 15 minutes    Health Maintenance reviewed:  Lipid Profile: no  Glucose Screen: no  Colonoscopy: no  Mammogram: no    Gynecologic History  Patient's last menstrual period was 08/05/2022 (exact date).  Due for first Pap smear today    Immunization History   Administered Date(s) Administered     COVID-19,PF,Pfizer (12+ Yrs) 03/30/2021, 04/22/2021, 12/21/2021     DTAP (<7y) 10/11/2005     DTaP, Unspecified 2000, 01/29/2001, 04/18/2001, 01/29/2002, 10/11/2005     FLU 6-35 months 12/07/2009     Flu, Unspecified 11/15/2008, 12/07/2009, 11/12/2012, 09/29/2020     Flu-nasal, Unspecified 11/14/2011     HPV Quadrivalent 08/07/2015     HPV9 10/19/2015, 03/31/2016     HepA, Unspecified 12/07/2009, 11/14/2011     HepA-Adult 12/07/2009, 11/14/2011     HepB, Unspecified 2000, 01/29/2001, 12/18/2001     Hib, Unspecified 2000, 01/29/2001, 12/18/2001     Influenza (H1N1) 11/21/2009     Influenza (IIV3) PF 10/11/2005, 11/17/2005, 02/17/2007, 11/15/2008, 11/12/2012, 10/21/2014     Influenza Intranasal Vaccine 11/14/2011     Influenza Vaccine IM > 6 months Valent IIV4 (Alfuria,Fluzone) 12/27/2013, 12/27/2013, 10/18/2021     Influenza Vaccine IM Ages 6-35 Months 4 Valent (PF) 09/29/2020     Influenza Vaccine, 6+MO IM (QUADRIVALENT W/PRESERVATIVES) 10/21/2014, 10/19/2015, 11/09/2018     Influenza,INJ,MDCK,PF,Quad >4yrs 09/29/2020     MMR 10/11/2001, 10/11/2005     Meningococcal (Bexsero ) 02/01/2019, 03/21/2019     Meningococcal (Menactra ) 11/14/2011, 06/30/2017     Pneumococcal (PCV 7) 2000, 01/29/2001, 06/26/2001     Poliovirus, inactivated (IPV) 2000, 01/29/2001, 03/20/2001, 10/11/2005     Tdap (Adacel,Boostrix) 11/14/2011, 03/02/2022     Varicella 11/26/2001, 12/07/2009     Immunization status: Up-to-date.    Current  Outpatient Medications   Medication Sig Dispense Refill     acetaminophen (TYLENOL) 500 MG tablet Take 1 tablet (500 mg) by mouth every 6 hours 90 tablet 0     buPROPion (WELLBUTRIN XL) 150 MG 24 hr tablet [BUPROPION (WELLBUTRIN XL) 150 MG 24 HR TABLET] Take 1 tablet (150 mg total) by mouth daily. (Patient taking differently: Take 150 mg by mouth every morning) 90 tablet 3     butalbital-acetaminophen-caffeine (ESGIC) -40 MG tablet Take 1 tablet by mouth every 4 hours as needed for headaches 60 tablet 1     ibuprofen (ADVIL/MOTRIN) 400 MG tablet Take 1 tablet (400 mg) by mouth every 6 hours 90 tablet 0     spironolactone (ALDACTONE) 50 MG tablet        Sulfacetamide Sodium-Sulfur 10-5 % LIQD APPLY TO FACE AND BACK ONCE DAILY (Patient not taking: Reported on 5/19/2022)       Sulfacetamide Sodium-Sulfur 10-5 % SUSP  (Patient not taking: Reported on 5/19/2022)       tretinoin (RETIN-A) 0.025 % external cream  (Patient not taking: Reported on 5/19/2022)       Past Medical History:   Diagnosis Date     Anxiety      Concussion      Migraine      Neuroendocrine neoplasm of appendix 02/2022     Past Surgical History:   Procedure Laterality Date     APPENDECTOMY  02/12/2022     DAVINCI COLECTOMY N/A 4/12/2022    Procedure: Robotic right hemicolectomy, true cut liver biopsy x2. Indocyanine green (ICG) angiography.;  Surgeon: Jacinto Campbell MD;  Location:  OR     DENTAL SURGERY      wisdom teeth     Grass extracts [gramineae pollens] and Other environmental allergy  Family History   Problem Relation Age of Onset     Basal cell carcinoma Mother      Hyperlipidemia Father      Anxiety Disorder Sister      Anxiety Disorder Sister      No Known Problems Sister      Chronic Obstructive Pulmonary Disease Maternal Grandmother      Rheumatoid Arthritis Maternal Grandmother      Hypertension Maternal Grandmother      Depression Maternal Grandfather      Alzheimer Disease Maternal Grandfather      Prostate Cancer  "Maternal Grandfather      Pacemaker Paternal Grandmother      Deep Vein Thrombosis (DVT) No family hx of      Social History     Socioeconomic History     Marital status: Single     Spouse name: Not on file     Number of children: Not on file     Years of education: Not on file     Highest education level: Not on file   Occupational History     Not on file   Tobacco Use     Smoking status: Never Smoker     Smokeless tobacco: Never Used   Substance and Sexual Activity     Alcohol use: Yes     Drug use: No     Sexual activity: Yes     Partners: Male     Birth control/protection: Implant   Other Topics Concern     Not on file   Social History Narrative    Lives at home with mother, father and 3 sisters.     Social Determinants of Health     Financial Resource Strain: Not on file   Food Insecurity: Not on file   Transportation Needs: Not on file   Physical Activity: Not on file   Stress: Not on file   Social Connections: Not on file   Intimate Partner Violence: Not on file   Housing Stability: Not on file       Review of Systems negative unless noted    Objective:        Vitals:    08/10/22 1135   BP: 100/78   Pulse: 80   Resp: 16   Temp: 97.6  F (36.4  C)   TempSrc: Oral   SpO2: 98%   Weight: 55 kg (121 lb 3.2 oz)   Height: 1.607 m (5' 3.25\")   PainSc: No Pain (0)     Body mass index is 21.3 kg/m .    Physical Exam:  General Appearance: Alert, pleasant, appears stated age  Head: Normocephalic, without obvious abnormality  Eyes: PERRL, conjunctiva/corneas clear, EOM's intact  Ears: Normal TM's and external ear canals, both ears  Neck: Supple,without lymphadenopathy, no thyromegaly or nodules noted  Lungs: Clear to auscultation bilaterally, respirations unlabored, no wheezing or crackles  Heart: Regular rate and rhythm, no murmur   Abdomen: Soft, non-tender, no masses, no organomegaly  Extremities: Extremities with strong and symmetric pulses, no cyanosis or edema  Skin: Skin color, texture normal, no rashes or " lesions  Neurologic: Grossly normal, no focal deficits  Pelvic exam: Normal external genitalia, normal-appearing cervix, mild white discharge

## 2022-08-11 LAB
C TRACH DNA SPEC QL NAA+PROBE: NEGATIVE
N GONORRHOEA DNA SPEC QL NAA+PROBE: NEGATIVE

## 2022-08-15 ENCOUNTER — MYC MEDICAL ADVICE (OUTPATIENT)
Dept: FAMILY MEDICINE | Facility: CLINIC | Age: 22
End: 2022-08-15

## 2022-08-15 DIAGNOSIS — N89.8 VAGINAL DISCHARGE: Primary | ICD-10-CM

## 2022-08-15 RX ORDER — METRONIDAZOLE 7.5 MG/G
1 GEL VAGINAL AT BEDTIME
Qty: 70 G | Refills: 0 | Status: SHIPPED | OUTPATIENT
Start: 2022-08-15 | End: 2022-08-20

## 2022-08-15 NOTE — TELEPHONE ENCOUNTER
Routing to PCP covering provider, Dr. Arana, to advise on patient's request for different medication or medication to help with side effects from taking the Flagyl.    Janet SOLIS RN

## 2022-08-18 ENCOUNTER — PATIENT OUTREACH (OUTPATIENT)
Dept: FAMILY MEDICINE | Facility: CLINIC | Age: 22
End: 2022-08-18

## 2022-08-18 LAB
BKR LAB AP GYN ADEQUACY: ABNORMAL
BKR LAB AP GYN INTERPRETATION: ABNORMAL
BKR LAB AP HPV REFLEX: NO
BKR LAB AP LMP: ABNORMAL
BKR LAB AP PREVIOUS ABNORMAL: ABNORMAL
PATH REPORT.COMMENTS IMP SPEC: ABNORMAL
PATH REPORT.COMMENTS IMP SPEC: ABNORMAL
PATH REPORT.RELEVANT HX SPEC: ABNORMAL

## 2022-09-02 ENCOUNTER — HOSPITAL ENCOUNTER (OUTPATIENT)
Dept: PHYSICAL THERAPY | Facility: REHABILITATION | Age: 22
Discharge: HOME | End: 2022-09-02
Payer: COMMERCIAL

## 2022-09-02 DIAGNOSIS — R20.0 NUMBNESS OF RIGHT ANTERIOR THIGH: Primary | ICD-10-CM

## 2022-09-02 DIAGNOSIS — M79.18 MYOFASCIAL PAIN: ICD-10-CM

## 2022-09-02 PROCEDURE — 97110 THERAPEUTIC EXERCISES: CPT | Mod: GP | Performed by: PHYSICAL THERAPIST

## 2022-09-02 PROCEDURE — 97140 MANUAL THERAPY 1/> REGIONS: CPT | Mod: GP | Performed by: PHYSICAL THERAPIST

## 2022-09-11 ENCOUNTER — HEALTH MAINTENANCE LETTER (OUTPATIENT)
Age: 22
End: 2022-09-11

## 2022-11-04 ENCOUNTER — HOSPITAL ENCOUNTER (OUTPATIENT)
Dept: PHYSICAL THERAPY | Facility: REHABILITATION | Age: 22
Discharge: HOME | End: 2022-11-04
Payer: COMMERCIAL

## 2022-11-04 DIAGNOSIS — M79.18 MYOFASCIAL PAIN: ICD-10-CM

## 2022-11-04 DIAGNOSIS — R20.0 NUMBNESS OF RIGHT ANTERIOR THIGH: Primary | ICD-10-CM

## 2022-11-04 PROCEDURE — 97110 THERAPEUTIC EXERCISES: CPT | Mod: GP | Performed by: PHYSICAL THERAPIST

## 2022-11-04 PROCEDURE — 97140 MANUAL THERAPY 1/> REGIONS: CPT | Mod: GP | Performed by: PHYSICAL THERAPIST

## 2022-11-05 ENCOUNTER — LAB (OUTPATIENT)
Dept: LAB | Facility: CLINIC | Age: 22
End: 2022-11-05
Attending: INTERNAL MEDICINE
Payer: COMMERCIAL

## 2022-11-05 ENCOUNTER — HOSPITAL ENCOUNTER (OUTPATIENT)
Dept: MRI IMAGING | Facility: CLINIC | Age: 22
Discharge: HOME | End: 2022-11-05
Attending: INTERNAL MEDICINE
Payer: COMMERCIAL

## 2022-11-05 DIAGNOSIS — C7A.8 PRIMARY MALIGNANT NEUROENDOCRINE TUMOR OF APPENDIX (H): ICD-10-CM

## 2022-11-05 PROCEDURE — 255N000002 HC RX 255 OP 636: Performed by: INTERNAL MEDICINE

## 2022-11-05 PROCEDURE — 36415 COLL VENOUS BLD VENIPUNCTURE: CPT

## 2022-11-05 PROCEDURE — 86316 IMMUNOASSAY TUMOR OTHER: CPT

## 2022-11-05 PROCEDURE — A9585 GADOBUTROL INJECTION: HCPCS | Performed by: INTERNAL MEDICINE

## 2022-11-05 PROCEDURE — 74183 MRI ABD W/O CNTR FLWD CNTR: CPT

## 2022-11-05 RX ORDER — GADOBUTROL 604.72 MG/ML
5.5 INJECTION INTRAVENOUS ONCE
Status: COMPLETED | OUTPATIENT
Start: 2022-11-05 | End: 2022-11-05

## 2022-11-05 RX ADMIN — GADOBUTROL 5.5 ML: 604.72 INJECTION INTRAVENOUS at 12:50

## 2022-11-08 LAB — CGA SERPL-MCNC: 47 NG/ML

## 2022-11-08 NOTE — PROGRESS NOTES
Na is a 22 year old who is being evaluated via a billable video visit.      How would you like to obtain your AVS? MyChart  If the video visit is dropped, the invitation should be resent by: Send to e-mail at: [email or web address]  Will anyone else be joining your video visit? No      Sana Williamson, Visit Rashida/MA.      Video-Visit Details    Video Start Time: 10:03 AM    Type of service:  Video Visit    Video End Time:10:13 AM    Originating Location (pt. Location): Home        Distant Location (provider location):  Off-site    Platform used for Video Visit: [facility]                Oncology Follow-up Visit:  Nov 9, 2022      CANCER DIAGNOSIS: [id number] Well-differentiated neuroendocrine tumor of the appendix, status post appendectomy at [facility] 02/11/2022 with negative margins and a low mitotic index Ki-67 5%.    Treatment to date: initial appendectomy detected the NET; right hemicolectomy completed April 12, 2022.    REFERRING PHYSICIAN:   Jacinto Campbell MD, Colorectal Surgery service, [facility].    Oncology History: She has migraine headaches, as do her 3 sisters and her mother.  These occur and are exacerbated during the time of her menstrual cycle.  Other than that, she had wisdom teeth removed.  She had been having abdominal pain for a period of 2 days as of 02/11/2022.  This led to an emergency room presentation at [facility].  It was felt she might have acute cystitis without hematuria.      Further evaluation was done and included a CT abdomen and pelvis without contrast.  This revealed that there was acute appendicitis without abscess, obstruction or free air or any macroscopic evidence of perforation.  Dr. Bob Aguero, general surgeon at [facility], took the patient to the operating room for appendectomy.  He removed the appendix and there was the discovery of a well-differentiated neuroendocrine tumor, specifically a carcinoid tumor.   It was grade 1/3.  It was 1.7 cm in greatest diameter and the tumor was located 1 cm from the proximal resection margin.  Thus, the resection margins were all negative.  However, 1 of 2 lymph nodes resected did contain neuroendocrine tumor deposit.  The tumor did invade the serosa. On further histopathologic examination, the mitotic rate was less than 2 mitoses per 2 mm2.  Again, it was well differentiated, grade 1, located in the proximal half of the appendix.  There was no evidence of lymphovascular nor perineural invasion.  Two lymph nodes were taken out in total as described above.  Pathologically, this was described as a T4 N1 lesion.  There is no mention of any perforation seen.  APPENDIX; APPENDECTOMY:  Neuroendocrine tumor (NET):   -Well differentiated - WHO grade 2 out of 3    -Size = 1.7 cm per gross description   -Tumor invades the wall involving the serosa   -Proximal and mesoappendiceal margins uninvolved (negative margins)   -Positive for metastasis to one of two mesoappendiceal lymph nodes (1 /2)   -AJCC/TNM stage: pT4 N1 (tumor with serosal/peritoneal involvement)   -Secondary acute serositis  Comment    The Ki67 index (performed at the [facility] on block A6) shows up to 5% Ki67 proliferation by manual assessment and supplemented by digital analysis on 5900 cells at the hottest spot.       She had subsequent followup including CT abdomen and pelvis and CT chest evaluated at the [facility].      She presented to the [facility] for further followup and met with Dr. Rosalio Campbell from our Colorectal Surgery team on 02/24/2022.    He reviewed her case and she confirms she does not have any carcinoid syndrome-like symptoms prior to or leading up to the diagnosis and surgery.  Specifically, she has not had any heart palpitations or chest pain, facial flushing or profuse or watery diarrhea or frequency of bowel movements.    He reviewed her CT scan and proceeded to discuss the patient's case  at our Multidisciplinary Tumor Board.    At that discussion, the recommendation was made to proceed with a right hemicolectomy, particularly due to the finding of involvement of a lymph node.    DOTATATE scan was done on 03/04/2022. The scan was done on 03/04/2022 and showed no evidence of distant metastatic disease.  There were some benign physiologic activity of uptake in the pancreatic uncinate process but not indicative of any evidence of metastatic disease from the neuroendocrine tumor.   IMPRESSION: In this patient with history of appendectomy, found to  have well differentiated neuroendocrine tumor:  1. No abnormal Dotatate activity in the remainder of the body to  suggest metastatic disease.  2. Dotatate activity in the approximate location of the pancreatic  uncinate process is benign physiologic activity (It is normal to have  overexpression of Somatostatin 2 receptors in the uncinate process.)     Chromogranin A was checked on 02/18/2022 in the postoperative setting and was 48.  It was about a week out from the surgery, normal range at that institution being 0-103.  CEA was unremarkable at less than 0.5.      A staging DOTATATE scan is as noted above.     April 12 2022--right hemicolectomy   Specimens:   A) - Liver, Left lobe liver lesion                                                                   B) - Liver, Right lobe liver lesion                                                                  C) - Large Intestine, Colon, Right colon terminal ileum                                    Final Diagnosis   A(1). Left lobe liver lesion, biopsy:  - Unremarkable liver  - No evidence of neuroendocrine tumor     B(2). Right lobe liver lesion, biopsy:  - Unremarkable liver  - No evidence of neuroendocrine tumor     C(3). Terminal ileum and right colon, right hemicolectomy:  - Unremarkable terminal ileum and right colon  - Two of eighteen lymph nodes positive for metastatic neuroendocrine tumor (2/18)        May 21, 2022 -- oncology follow-up/virtual visit, Dr. Mera.           Interim History/History Of Present Illness:    Na is seen virtually in follow-up on active surveillance. She is joined by her parents on FaceTime. She has been feeling well. She has recovered well following surgery. She has some abdominal bloating in her lower abdomen intermittently otherwise no pain, nausea, or difficulties with bowel movements. No flushing episodes. No fevers/chills or infections. She still has some R thigh numbness after surgery but it no longer bothers her when she walks.     Past medical, social, surgical, and family histories reviewed.    PAST MEDICAL AND SURGICAL HISTORY:  Only notable for migraine headaches that her mother states that she has herself and also Na's 3 sisters.  For Na herself, she endorses that it happens and is exacerbated during menstrual cycles.  She has had wisdom teeth removed.  Otherwise, no significant surgeries aside from most obviously the appendectomy 2022 performed by Dr. Bob Aguero at [facility] as noted above, diagnostic of a well-differentiated carcinoid tumor of the appendix.    FAMILY HISTORY:  Family history of malignancy includes her father who was on the call today who had prostate cancer at 55.  He is now 58 and sounds to be doing well.  Paternal grandfather had prostate cancer at age 75 and  at 86 of unrelated causes.  Mother had basal cell carcinoma of the skin.  She has 3 sisters, 2 older, 1 younger.  All are in good health with no evidence of cancer.  There are no other familial germline mutations known.    SOCIAL HISTORY:  Na is staying currently with her parents in [place], [place].  She attends [employer].  She is majoring in business.  She is in the class of .        Allergies:  Allergies as of 2022 - Reviewed 08/10/2022   Allergen Reaction Noted     Grass extracts [gramineae pollens]  2017     Other environmental allergy  Unknown 05/30/2017       Current Medications:  Current Outpatient Medications   Medication Sig Dispense Refill     acetaminophen (TYLENOL) 500 MG tablet Take 1 tablet (500 mg) by mouth every 6 hours 90 tablet 0     buPROPion (WELLBUTRIN XL) 150 MG 24 hr tablet [BUPROPION (WELLBUTRIN XL) 150 MG 24 HR TABLET] Take 1 tablet (150 mg total) by mouth daily. (Patient taking differently: Take 150 mg by mouth every morning) 90 tablet 3     butalbital-acetaminophen-caffeine (ESGIC) -40 MG tablet Take 1 tablet by mouth every 4 hours as needed for headaches 60 tablet 1     ibuprofen (ADVIL/MOTRIN) 400 MG tablet Take 1 tablet (400 mg) by mouth every 6 hours 90 tablet 0     spironolactone (ALDACTONE) 50 MG tablet        Sulfacetamide Sodium-Sulfur 10-5 % LIQD APPLY TO FACE AND BACK ONCE DAILY (Patient not taking: Reported on 5/19/2022)       Sulfacetamide Sodium-Sulfur 10-5 % SUSP  (Patient not taking: Reported on 5/19/2022)       tretinoin (RETIN-A) 0.025 % external cream  (Patient not taking: Reported on 5/19/2022)          Physical Exam:  Video physical exam  General: Patient appears well in no acute distress.   Skin: No visualized rash or lesions on visualized skin  Eyes: EOMI, no erythema, sclera icterus or discharge noted  Resp: Appears to be breathing comfortably without accessory muscle usage, speaking in full sentences, no cough  MSK: Appears to have normal range of motion based on visualized movements  Neurologic: No apparent tremors, facial movements symmetric  Psych: affect bright, alert and oriented      Laboratory/Imaging Studies    Chromogranin A    02/18/22 09:20 11/05/22 11:09   Chromogranin A 48 47       EXAM: MR ABDOMEN W/O and W CONTRAST  LOCATION: [facility]  DATE/TIME: 11/5/2022 12:53 PM     INDICATION:  Primary malignant neuroendocrine tumor of appendix (H)  COMPARISON: CT 02/11/2022, Dotatate PET 03/04/2022  TECHNIQUE: Routine MRI abdomen protocol including T1 in/out phase,  diffusion, multiplane T2, and dynamic T1 without and with IV contrast.   CONTRAST: 5.5mlmL Gadavist     FINDINGS:      LIVER: Normal signal and contrast enhancement throughout the liver. No suspicious arterially hyperenhancing lesions or foci of abnormal restricted diffusion.     OTHER FINDINGS: No suspicious lymphadenopathy. Gallbladder, pancreas, spleen, adrenal glands and kidneys are unremarkable. No bowel obstruction. No suspicious bony lesions.                                                                      IMPRESSION:  1.  No evidence of metastatic disease in the abdomen.    ASSESSMENT/PLAN:  Ms. Na De León is a 21-year-old woman with neuroendocrine tumor, specifically well-differentiated carcinoid tumor of the appendix with a low mitotic rate.  It was removed during appendectomy when she initially presented with appendicitis on 02/11/2022.  It was fully resected with negative margins; however, although it was under 2 cm in size, specifically 1.7 cm, there was 1 of 2 lymph nodes involved with malignancy. Right hemicolectomy was done 4/12/22 showing 2/18 lymph nodes involved with metastatic neuroendocrine tumor.     She met with Dr. Mera in May, and he recommended active surveillance per standard of care NCCN/ASCO guidelines. She had an MRI last week showing OCTAVIANO. Her chromogranin A was WNL as well. She has no concerning s/s today.     Reviewed plan for 6 month abdominal MRI and chromogranin A level and see Dr. Mera to review.      25 minutes spent on the date of the encounter doing chart review, review of test results, interpretation of tests, patient visit and documentation     Shantelle Amaro PA-C

## 2022-11-09 ENCOUNTER — PATIENT OUTREACH (OUTPATIENT)
Dept: ONCOLOGY | Facility: CLINIC | Age: 22
End: 2022-11-09

## 2022-11-09 ENCOUNTER — VIRTUAL VISIT (OUTPATIENT)
Dept: ONCOLOGY | Facility: CLINIC | Age: 22
End: 2022-11-09
Attending: INTERNAL MEDICINE
Payer: COMMERCIAL

## 2022-11-09 DIAGNOSIS — C7A.020 MALIGNANT CARCINOID TUMOR OF APPENDIX (H): Primary | ICD-10-CM

## 2022-11-09 PROCEDURE — 99213 OFFICE O/P EST LOW 20 MIN: CPT | Mod: 95 | Performed by: PHYSICIAN ASSISTANT

## 2022-11-09 NOTE — LETTER
11/9/2022         RE: Na De León  [address]   [place] [address]        Dear Colleague,    Thank you for referring your patient, Na De León, to the [facility]. Please see a copy of my visit note below.    Na is a 22 year old who is being evaluated via a billable video visit.      How would you like to obtain your AVS? MyChart  If the video visit is dropped, the invitation should be resent by: Send to e-mail at: [email or web address]  Will anyone else be joining your video visit? No      Sana Williamson, Visit Facilitator/MA.      Video-Visit Details    Video Start Time: 10:03 AM    Type of service:  Video Visit    Video End Time:10:13 AM    Originating Location (pt. Location): Home        Distant Location (provider location):  Off-site    Platform used for Video Visit: [facility]                Oncology Follow-up Visit:  Nov 9, 2022      CANCER DIAGNOSIS: [id number] Well-differentiated neuroendocrine tumor of the appendix, status post appendectomy at [facility] 02/11/2022 with negative margins and a low mitotic index Ki-67 5%.    Treatment to date: initial appendectomy detected the NET; right hemicolectomy completed April 12, 2022.    REFERRING PHYSICIAN:   Jacinto Campbell MD, Colorectal Surgery service, [facility].    Oncology History: She has migraine headaches, as do her 3 sisters and her mother.  These occur and are exacerbated during the time of her menstrual cycle.  Other than that, she had wisdom teeth removed.  She had been having abdominal pain for a period of 2 days as of 02/11/2022.  This led to an emergency room presentation at [facility].  It was felt she might have acute cystitis without hematuria.      Further evaluation was done and included a CT abdomen and pelvis without contrast.  This revealed that there was acute appendicitis without abscess, obstruction or free air or any macroscopic  evidence of perforation.  Dr. Bob Aguero, general surgeon at [facility], took the patient to the operating room for appendectomy.  He removed the appendix and there was the discovery of a well-differentiated neuroendocrine tumor, specifically a carcinoid tumor.  It was grade 1/3.  It was 1.7 cm in greatest diameter and the tumor was located 1 cm from the proximal resection margin.  Thus, the resection margins were all negative.  However, 1 of 2 lymph nodes resected did contain neuroendocrine tumor deposit.  The tumor did invade the serosa. On further histopathologic examination, the mitotic rate was less than 2 mitoses per 2 mm2.  Again, it was well differentiated, grade 1, located in the proximal half of the appendix.  There was no evidence of lymphovascular nor perineural invasion.  Two lymph nodes were taken out in total as described above.  Pathologically, this was described as a T4 N1 lesion.  There is no mention of any perforation seen.  APPENDIX; APPENDECTOMY:  Neuroendocrine tumor (NET):   -Well differentiated - WHO grade 2 out of 3    -Size = 1.7 cm per gross description   -Tumor invades the wall involving the serosa   -Proximal and mesoappendiceal margins uninvolved (negative margins)   -Positive for metastasis to one of two mesoappendiceal lymph nodes (1 /2)   -AJCC/TNM stage: pT4 N1 (tumor with serosal/peritoneal involvement)   -Secondary acute serositis  Comment    The Ki67 index (performed at the [facility] on block A6) shows up to 5% Ki67 proliferation by manual assessment and supplemented by digital analysis on 5900 cells at the hottest spot.       She had subsequent followup including CT abdomen and pelvis and CT chest evaluated at the [facility].      She presented to the [facility] for further followup and met with Dr. Rosalio Campbell from our Colorectal Surgery team on 02/24/2022.    He reviewed her case and she confirms she does not have any carcinoid syndrome-like symptoms prior  to or leading up to the diagnosis and surgery.  Specifically, she has not had any heart palpitations or chest pain, facial flushing or profuse or watery diarrhea or frequency of bowel movements.    He reviewed her CT scan and proceeded to discuss the patient's case at our Multidisciplinary Tumor Board.    At that discussion, the recommendation was made to proceed with a right hemicolectomy, particularly due to the finding of involvement of a lymph node.    DOTATATE scan was done on 03/04/2022. The scan was done on 03/04/2022 and showed no evidence of distant metastatic disease.  There were some benign physiologic activity of uptake in the pancreatic uncinate process but not indicative of any evidence of metastatic disease from the neuroendocrine tumor.   IMPRESSION: In this patient with history of appendectomy, found to  have well differentiated neuroendocrine tumor:  1. No abnormal Dotatate activity in the remainder of the body to  suggest metastatic disease.  2. Dotatate activity in the approximate location of the pancreatic  uncinate process is benign physiologic activity (It is normal to have  overexpression of Somatostatin 2 receptors in the uncinate process.)     Chromogranin A was checked on 02/18/2022 in the postoperative setting and was 48.  It was about a week out from the surgery, normal range at that institution being 0-103.  CEA was unremarkable at less than 0.5.      A staging DOTATATE scan is as noted above.     April 12 2022--right hemicolectomy   Specimens:   A) - Liver, Left lobe liver lesion                                                                   B) - Liver, Right lobe liver lesion                                                                  C) - Large Intestine, Colon, Right colon terminal ileum                                    Final Diagnosis   A(1). Left lobe liver lesion, biopsy:  - Unremarkable liver  - No evidence of neuroendocrine tumor     B(2). Right lobe liver lesion,  biopsy:  - Unremarkable liver  - No evidence of neuroendocrine tumor     C(3). Terminal ileum and right colon, right hemicolectomy:  - Unremarkable terminal ileum and right colon  - Two of eighteen lymph nodes positive for metastatic neuroendocrine tumor ()       May 21, 2022 -- oncology follow-up/virtual visit, Dr. Mera.           Interim History/History Of Present Illness:    Na is seen virtually in follow-up on active surveillance. She is joined by her parents on FaceTime. She has been feeling well. She has recovered well following surgery. She has some abdominal bloating in her lower abdomen intermittently otherwise no pain, nausea, or difficulties with bowel movements. No flushing episodes. No fevers/chills or infections. She still has some R thigh numbness after surgery but it no longer bothers her when she walks.     Past medical, social, surgical, and family histories reviewed.    PAST MEDICAL AND SURGICAL HISTORY:  Only notable for migraine headaches that her mother states that she has herself and also Na's 3 sisters.  For Na herself, she endorses that it happens and is exacerbated during menstrual cycles.  She has had wisdom teeth removed.  Otherwise, no significant surgeries aside from most obviously the appendectomy 2022 performed by Dr. Bob Aguero at [facility] as noted above, diagnostic of a well-differentiated carcinoid tumor of the appendix.    FAMILY HISTORY:  Family history of malignancy includes her father who was on the call today who had prostate cancer at 55.  He is now 58 and sounds to be doing well.  Paternal grandfather had prostate cancer at age 75 and  at 86 of unrelated causes.  Mother had basal cell carcinoma of the skin.  She has 3 sisters, 2 older, 1 younger.  All are in good health with no evidence of cancer.  There are no other familial germline mutations known.    SOCIAL HISTORY:  Na is staying currently with her parents in [place], [place].   She attends [employer].  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        Allergies:  Allergies as of 11/09/2022 - Reviewed 08/10/2022   Allergen Reaction Noted     Grass extracts [gramineae pollens]  05/30/2017     Other environmental allergy Unknown 05/30/2017       Current Medications:  Current Outpatient Medications   Medication Sig Dispense Refill     acetaminophen (TYLENOL) 500 MG tablet Take 1 tablet (500 mg) by mouth every 6 hours 90 tablet 0     buPROPion (WELLBUTRIN XL) 150 MG 24 hr tablet [BUPROPION (WELLBUTRIN XL) 150 MG 24 HR TABLET] Take 1 tablet (150 mg total) by mouth daily. (Patient taking differently: Take 150 mg by mouth every morning) 90 tablet 3     butalbital-acetaminophen-caffeine (ESGIC) -40 MG tablet Take 1 tablet by mouth every 4 hours as needed for headaches 60 tablet 1     ibuprofen (ADVIL/MOTRIN) 400 MG tablet Take 1 tablet (400 mg) by mouth every 6 hours 90 tablet 0     spironolactone (ALDACTONE) 50 MG tablet        Sulfacetamide Sodium-Sulfur 10-5 % LIQD APPLY TO FACE AND BACK ONCE DAILY (Patient not taking: Reported on 5/19/2022)       Sulfacetamide Sodium-Sulfur 10-5 % SUSP  (Patient not taking: Reported on 5/19/2022)       tretinoin (RETIN-A) 0.025 % external cream  (Patient not taking: Reported on 5/19/2022)          Physical Exam:  Video physical exam  General: Patient appears well in no acute distress.   Skin: No visualized rash or lesions on visualized skin  Eyes: EOMI, no erythema, sclera icterus or discharge noted  Resp: Appears to be breathing comfortably without accessory muscle usage, speaking in full sentences, no cough  MSK: Appears to have normal range of motion based on visualized movements  Neurologic: No apparent tremors, facial movements symmetric  Psych: affect bright, alert and oriented      Laboratory/Imaging Studies    Chromogranin A    02/18/22 09:20 11/05/22 11:09   Chromogranin A 48 47       EXAM: MR ABDOMEN W/O and W  CONTRAST  LOCATION: [facility]  DATE/TIME: 11/5/2022 12:53 PM     INDICATION:  Primary malignant neuroendocrine tumor of appendix (H)  COMPARISON: CT 02/11/2022, Dotatate PET 03/04/2022  TECHNIQUE: Routine MRI abdomen protocol including T1 in/out phase, diffusion, multiplane T2, and dynamic T1 without and with IV contrast.   CONTRAST: 5.5mlmL Gadavist     FINDINGS:      LIVER: Normal signal and contrast enhancement throughout the liver. No suspicious arterially hyperenhancing lesions or foci of abnormal restricted diffusion.     OTHER FINDINGS: No suspicious lymphadenopathy. Gallbladder, pancreas, spleen, adrenal glands and kidneys are unremarkable. No bowel obstruction. No suspicious bony lesions.                                                                      IMPRESSION:  1.  No evidence of metastatic disease in the abdomen.    ASSESSMENT/PLAN:  Ms. Na De León is a 21-year-old woman with neuroendocrine tumor, specifically well-differentiated carcinoid tumor of the appendix with a low mitotic rate.  It was removed during appendectomy when she initially presented with appendicitis on 02/11/2022.  It was fully resected with negative margins; however, although it was under 2 cm in size, specifically 1.7 cm, there was 1 of 2 lymph nodes involved with malignancy. Right hemicolectomy was done 4/12/22 showing 2/18 lymph nodes involved with metastatic neuroendocrine tumor.     She met with Dr. Mera in May, and he recommended active surveillance per standard of care NCCN/ASCO guidelines. She had an MRI last week showing OCTAVIANO. Her chromogranin A was WNL as well. She has no concerning s/s today.     Reviewed plan for 6 month abdominal MRI and chromogranin A level and see Dr. Mera to review.      25 minutes spent on the date of the encounter doing chart review, review of test results, interpretation of tests, patient visit and documentation     Shantelle Amaro PA-C                      Again,  thank you for allowing me to participate in the care of your patient.        Sincerely,        Shantelle Amaro PA-C

## 2022-11-09 NOTE — PROGRESS NOTES
[facility]: Cancer Care Short Note                                                                                          Completed chart audit to assign Oncology Care Coordination enrollment status.

## 2022-11-09 NOTE — NURSING NOTE
Patient denies any changes since echeck-in regarding medication and allergies and states all information entered during echeck-in remains accurate.    Sana Williamson, Visit Facilitator/MA.

## 2022-11-25 ENCOUNTER — HOSPITAL ENCOUNTER (OUTPATIENT)
Dept: PHYSICAL THERAPY | Facility: REHABILITATION | Age: 22
Discharge: HOME | End: 2022-11-25
Payer: COMMERCIAL

## 2022-11-25 DIAGNOSIS — R20.0 NUMBNESS OF RIGHT ANTERIOR THIGH: Primary | ICD-10-CM

## 2022-11-25 DIAGNOSIS — M79.18 MYOFASCIAL PAIN: ICD-10-CM

## 2022-11-25 PROCEDURE — 97140 MANUAL THERAPY 1/> REGIONS: CPT | Mod: GP | Performed by: PHYSICAL THERAPIST

## 2022-11-25 PROCEDURE — 97110 THERAPEUTIC EXERCISES: CPT | Mod: GP | Performed by: PHYSICAL THERAPIST

## 2022-12-07 ENCOUNTER — HOSPITAL ENCOUNTER (EMERGENCY)
Facility: CLINIC | Age: 22
Discharge: HOME | End: 2022-12-07
Attending: EMERGENCY MEDICINE | Admitting: EMERGENCY MEDICINE
Payer: COMMERCIAL

## 2022-12-07 VITALS
OXYGEN SATURATION: 99 % | BODY MASS INDEX: 21.79 KG/M2 | DIASTOLIC BLOOD PRESSURE: 71 MMHG | RESPIRATION RATE: 18 BRPM | HEART RATE: 99 BPM | TEMPERATURE: 97.7 F | WEIGHT: 123 LBS | SYSTOLIC BLOOD PRESSURE: 105 MMHG | HEIGHT: 63 IN

## 2022-12-07 DIAGNOSIS — R45.851 SUICIDAL IDEATION: ICD-10-CM

## 2022-12-07 DIAGNOSIS — F32.A DEPRESSION, UNSPECIFIED DEPRESSION TYPE: ICD-10-CM

## 2022-12-07 PROCEDURE — 90791 PSYCH DIAGNOSTIC EVALUATION: CPT

## 2022-12-07 PROCEDURE — 99285 EMERGENCY DEPT VISIT HI MDM: CPT | Mod: 25

## 2022-12-07 RX ORDER — HYDROXYZINE HYDROCHLORIDE 25 MG/1
25 TABLET, FILM COATED ORAL EVERY 8 HOURS PRN
Qty: 25 TABLET | Refills: 0 | Status: SHIPPED | OUTPATIENT
Start: 2022-12-07 | End: 2022-12-12

## 2022-12-07 ASSESSMENT — COLUMBIA-SUICIDE SEVERITY RATING SCALE - C-SSRS
REASONS FOR IDEATION PAST MONTH: COMPLETELY TO GET ATTENTION, REVENGE, OR A REACTION FROM OTHERS
TOTAL  NUMBER OF INTERRUPTED ATTEMPTS LIFETIME: NO
TOTAL  NUMBER OF ABORTED OR SELF INTERRUPTED ATTEMPTS LIFETIME: NO
ATTEMPT LIFETIME: NO
6. HAVE YOU EVER DONE ANYTHING, STARTED TO DO ANYTHING, OR PREPARED TO DO ANYTHING TO END YOUR LIFE?: NO
1. IN THE PAST MONTH, HAVE YOU WISHED YOU WERE DEAD OR WISHED YOU COULD GO TO SLEEP AND NOT WAKE UP?: YES
REASONS FOR IDEATION LIFETIME: COMPLETELY TO GET ATTENTION, REVENGE, OR A REACTION FROM OTHERS
2. HAVE YOU ACTUALLY HAD ANY THOUGHTS OF KILLING YOURSELF?: NO
1. HAVE YOU WISHED YOU WERE DEAD OR WISHED YOU COULD GO TO SLEEP AND NOT WAKE UP?: YES

## 2022-12-07 ASSESSMENT — ACTIVITIES OF DAILY LIVING (ADL)
ADLS_ACUITY_SCORE: 35
ADLS_ACUITY_SCORE: 35

## 2022-12-08 ENCOUNTER — TELEPHONE (OUTPATIENT)
Dept: FAMILY MEDICINE | Facility: CLINIC | Age: 22
End: 2022-12-08

## 2022-12-08 ENCOUNTER — PATIENT OUTREACH (OUTPATIENT)
Dept: CARE COORDINATION | Facility: CLINIC | Age: 22
End: 2022-12-08

## 2022-12-08 NOTE — ED NOTES
Pt reports feeling better and suicidal ideations are not there any longer. She feels better. Mother states she will watch her overnight, and will be in assistance. Pt declined urine.

## 2022-12-08 NOTE — CONSULTS
Diagnostic Evaluation Consultation  Crisis Assessment    Patient was assessed: Sweetie  Patient location: [facility] ED  Was a release of information signed: Yes. Providers included on the release: New therapy and Med management      Referral Data and Chief Complaint  Na De León is a 22 year old, who uses she/her pronouns, and presents to the ED with family/friends. Patient is referred to the ED by self. Patient is presenting to the ED for the following concerns: worsening anxiety and some Passive SI.      Informed Consent and Assessment Methods     Patient is her own guardian. Writer met with patient and explained the crisis assessment process, including applicable information disclosures and limits to confidentiality, assessed understanding of the process, and obtained consent to proceed with the assessment. Patient was observed to be able to participate in the assessment as evidenced by Verbal consent. Assessment methods included conducting a formal interview with patient, review of medical records, collaboration with medical staff, and obtaining relevant collateral information from family and community providers when available..     Over the course of this crisis assessment provided reassurance, offered validation, engaged patient in problem solving and disposition planning and worked with patient on safety and aftercare planning. Patient's response to interventions was Positive Patient thankful for upcoming appts     Summary of Patient Situation  Patient presents to the ED today after she was seen in an ED in [place], [place] this morning. Patient reports she was having increasing SI thoughts and anxiety the past 4 days and went to the ED near her college for help. They were unable to get her into any appointments and Patient was feeling very frustrated by this. Patient reports that she was told to come to another ED in the [facility] to get help with appointments. Per chart review, Patient was unable to get any  "appointments through the Previous ED as their  was out for the day. Patient reports feeling anxious and depressed the past few months but it becoming worse the past 4 days. She reports she started to have suicidal thoughts of taking \"a bunch of pills\" 4 days ago and felt it was concerning so she sought help today. She denies any intent and voices a desire to feel better. She reports that she had cancer and a break up this year that have likely increased her struggle with Mental health. Patient also states that she has been unable to sleep for the past 4 days due to her significant anxiety. Patient appeared very tired from lack of sleep and the stress of the day.     Brief Psychosocial History  Patient is a senior in college at [employer]. 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 When not in school she lives with her Parents in [place]. She has 4 sisters all either out of the house or in college. She denies any legal issues. She is Buddhism but does not practice actively.      Significant Clinical History  Patient reports that she took Wellbutrin several years ago for anxiety but felt it made her less productive and stressed out. She reported that it did not help her depression but took some of her anxiety which she uses to help keep her motivated. She denies any inpatient hospitalizations or other mental health interventions.      Collateral Information    Patients mother was at bedside for the assessment at the request of the Patient. Patient mother agrees with assessment and asked questions at the end of how to best support her daughter.      Risk Assessment  [place] Suicide Severity Rating Scale Full Clinical Version:12/8/2022    Suicidal Ideation  1. Wish to be Dead (Lifetime): Yes  1. Wish to be Dead (Past 1 Month): Yes  2. Non-Specific Active Suicidal Thoughts (Lifetime): No  Intensity of Ideation  Most Severe Ideation Rating (Lifetime): 2  Description of Most Severe " Ideation (Lifetime): started a few days ago passive SI  Most Severe Ideation Rating (Past 1 Month): 2  Frequency (Lifetime): Less than once a week  Frequency (Past 1 Month): 2-5 times in week  Duration (Lifetime): Fleeting, few seconds or minutes  Duration (Past 1 Month): Less than 1 hour/some of the time  Controllability (Lifetime): Easily able to control thoughts  Controllability (Past 1 Month): Can control thoughts with little difficulty  Deterrents (Lifetime): Deterrents definitely stopped you from attempting suicide  Deterrents (Past 1 Month): Deterrents definitely stopped you from attempting suicide  Reasons for Ideation (Lifetime): Completely to get attention, revenge, or a reaction from others  Reasons for Ideation (Past 1 Month): Completely to get attention, revenge, or a reaction from others  Suicidal Behavior  Actual Attempt (Lifetime): No  Has subject engaged in non-suicidal self-injurious behavior? (Lifetime): No  Interrupted Attempts (Lifetime): No  Aborted or Self-Interrupted Attempt (Lifetime): No  Preparatory Acts or Behavior (Lifetime): No  C-SSRS Risk (Lifetime/Recent)  Calculated C-SSRS Risk Score (Lifetime/Recent): Low Risk      Validity of evaluation is not impacted by presenting factors during interview .   Comments regarding subjective versus objective responses to [place] tool: NA  Environmental or Psychosocial Events: challenging interpersonal relationships and other: past cancer treatment  Chronic Risk Factors: NA   Warning Signs: hopelessness, feeling trapped, like there is no way out, withdrawing from friends, family, and society, anxiety, agitation, unable to sleep, sleeping all the time, dramatic changes in mood and no reason for living, no sense of purpose in life  Protective Factors: strong bond to family unit, community support, or employment, lives in a responsibly safe and stable environment, good treatment engagement, sense of importance of health and wellness, able to access  care without barriers, supportive ongoing medical and mental health care relationships, help seeking, good impulse control, cultural, spiritual , or Rastafarian beliefs associated with meaning and value in life and reality testing ability  Interpretation of Risk Scoring, Risk Mitigation Interventions and Safety Plan:  Patient does appear low risk, she voices a desire feel better. She is help seeking.        Does the patient have thoughts of harming others? No     Is the patient engaging in sexually inappropriate behavior?  no        Current Substance Abuse     Is there recent substance abuse? no     Was a urine drug screen or blood alcohol level obtained: No       Mental Status Exam     Affect: Appropriate   Appearance: Appropriate    Attention Span/Concentration: Attentive  Eye Contact: Engaged   Fund of Knowledge: Appropriate    Language /Speech Content: Fluent   Language /Speech Volume: Normal    Language /Speech Rate/Productions: Normal    Recent Memory: Intact   Remote Memory: Intact   Mood: Anxious and Depressed    Orientation to Person: Yes    Orientation to Place: Yes   Orientation to Time of Day: Yes    Orientation to Date: Yes    Situation (Do they understand why they are here?): Yes    Psychomotor Behavior: Normal    Thought Content: Clear   Thought Form: Intact      History of commitment: No    Medication    Psychotropic medications: No  Medication changes made in the last two weeks: No       Current Care Team    Primary Care Provider: No  Psychiatrist: No  Therapist: No  : No     CTSS or ARMHS: No  ACT Team: No  Other: No      Diagnosis    Adjustment disorder, With anxiety - (F43.22)    Clinical Summary and Substantiation of Recommendations    Patient does not appear to be in imminent risk of harm to self. Crystaltent is help seeking, She was cleared by a previous ED this afternoon and continues to voice an ability to be safe. Patient reports she came to this ED to get help set up with  appointment. Patient was an active participant in the assessment and voiced appreciation for appointments made. Patient and LMHP discussed the crisis lines and other Mental health options available to her. LMHP encouraged her to see her PCP tomorrow she she would not have to wait until the scheduled appointment set up for 12/16/2022. Patient, mom and LMHP all felt safe with the discharge plan.   Disposition    Recommended disposition: Individual Therapy and Medication Management       Reviewed case and recommendations with attending provider. Attending Name: Dr. Lemos       Attending concurs with disposition: Yes       Patient concurs with disposition: Yes       Guardian concurs with disposition: NA      Final disposition: Individual therapy  and Medication management.     Outpatient Details (if applicable):   Aftercare plan and appointments placed in the AVS and provided to patient: Yes. Given to patient by Bedside RN    Was lethal means counseling provided as a part of aftercare planning? No;       Assessment Details    Patient interview started at: 10:40 PM and completed at: 11:15.     Total duration spent on the patient case in minutes: .75 hrs      CPT code(s) utilized: 05701 - Psychotherapy for Crisis - 60 (30-74*) min     Josephine Ashford, AICHA, LICSW, LMHP  DEC - Triage & Transition Services  Callback: [phone]

## 2022-12-08 NOTE — ED TRIAGE NOTES
Pt arrives to ED with c/o suicidal ideation with plan for the past two days. Pt was seen earlier today at [facility] in [place] and contracted for safety with a follow up plan but where the sent her did not have staff so pt unable to receive the help she needs. eMlissa not on medications or in therapy. Pt was recently dx with cancer and states a lot of things are going wrong in her life and she's going through a lot. Mom present at triage.      Triage Assessment       Row Name 12/07/22 1955       Triage Assessment (Adult)    Airway WDL WDL       Respiratory WDL    Respiratory WDL WDL       Skin Circulation/Temperature WDL    Skin Circulation/Temperature WDL WDL       Cardiac WDL    Cardiac WDL WDL       Peripheral/Neurovascular WDL    Peripheral Neurovascular WDL WDL       Cognitive/Neuro/Behavioral WDL    Cognitive/Neuro/Behavioral WDL WDL

## 2022-12-08 NOTE — ED PROVIDER NOTES
EMERGENCY DEPARTMENT ENCOUNTER      NAME: Na De León  AGE: 22 year old female  YOB: 2000  MRN: [id number]  EVALUATION DATE & TIME: 12/7/2022  8:45 PM    PCP: Frances Don    ED PROVIDER: Ajay Lemos M.D.      Chief Complaint   Patient presents with     Suicidal         FINAL IMPRESSION:  1. Depression, unspecified depression type    2. Suicidal ideation          ED COURSE & MEDICAL DECISION MAKING:    Pertinent Labs & Imaging studies reviewed. (See chart for details)  22 year old female presents to the Emergency Department for evaluation of depression and suicidal ideation.  Having worsening suicidal ideation.  Had been seen up in [place] but her outpatient resources fell through.  Did come down here.  Seen by DEC .  Patient able to contract for safety.  We were able to get her a therapist appointment tomorrow and a psychiatrist appointment next week.  She will go home with her mother.  Again she is able to contract for safety.  Patient discharged.    8:53 PM I met with the patient to gather history and to perform my initial exam. I discussed the plan for care while in the Emergency Department. PPE: gloves and surgical mask     At the conclusion of the encounter I discussed the results of all of the tests and the disposition. The questions were answered. The patient or family acknowledged understanding and was agreeable with the care plan.            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 They state that they have had \"thoughts to hurt myself\" in the last 4 days. They report they were diagnosed with cancer earlier this year which has been contributing to their mental health. The patient reports they had a plan for suicide by \"probably taking [their] pills\", but currently reports no plan. The patient notes they were on wellbutrin about a year ago, but stopped taking it because they felt it decreased their productivity. The patient denies prior mental health hospitalizations or suicide attempts.   The patient notes they are a senior at [employer] and tried to go to \"[facility]\" up there, but they \"didn't have any openings\", so they were referred to their school's clinic. They state that \"there was a miscommunication\" and there was no doctor there that could help them so they were told to go to the ER. The patient is from this area so they came to this ED.     REVIEW OF SYSTEMS   Review of Systems   Psychiatric/Behavioral: Positive for suicidal ideas.   All other systems reviewed and are negative.       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 Indocyanine green (ICG) angiography.;  Surgeon: Jacinto Campbell MD;  Location:  OR     DENTAL SURGERY      wisdom teeth           CURRENT MEDICATIONS:  " "  No current facility-administered medications for this encounter.     Current Outpatient Medications   Medication     hydrOXYzine (ATARAX) 25 MG tablet     acetaminophen (TYLENOL) 500 MG tablet     buPROPion (WELLBUTRIN XL) 150 MG 24 hr tablet     butalbital-acetaminophen-caffeine (ESGIC) -40 MG tablet     ibuprofen (ADVIL/MOTRIN) 400 MG tablet     spironolactone (ALDACTONE) 50 MG tablet     Sulfacetamide Sodium-Sulfur 10-5 % LIQD     Sulfacetamide Sodium-Sulfur 10-5 % SUSP     tretinoin (RETIN-A) 0.025 % external cream         ALLERGIES:  Allergies   Allergen Reactions     Grass Extracts [Gramineae Pollens]      Other reaction(s): Other (see comments)  Seasonal     Other Environmental Allergy Unknown     Seasonal       FAMILY HISTORY:  Family History   Problem Relation Age of Onset     Basal cell carcinoma Mother      Hyperlipidemia Father      Anxiety Disorder Sister      Anxiety Disorder Sister      No Known Problems Sister      Chronic Obstructive Pulmonary Disease Maternal Grandmother      Rheumatoid Arthritis Maternal Grandmother      Hypertension Maternal Grandmother      Depression Maternal Grandfather      Alzheimer Disease Maternal Grandfather      Prostate Cancer Maternal Grandfather      Pacemaker Paternal Grandmother      Deep Vein Thrombosis (DVT) No family hx of        SOCIAL HISTORY:   Social History     Socioeconomic History     Marital status: Single   Tobacco Use     Smoking status: Never     Smokeless tobacco: Never   Substance and Sexual Activity     Alcohol use: Yes     Drug use: No     Sexual activity: Yes     Partners: Male     Birth control/protection: Implant   Social History Narrative    Lives at home with mother, father and 3 sisters.       VITALS:  /71   Pulse 99   Temp 97.7  F (36.5  C) (Oral)   Resp 18   Ht 1.6 m (5' 3\")   Wt 55.8 kg (123 lb)   SpO2 99%   BMI 21.79 kg/m      PHYSICAL EXAM    Physical Exam  Vitals and nursing note reviewed.   Constitutional:       " General: She is not in acute distress.     Appearance: She is not diaphoretic.   HENT:      Head: Atraumatic.      Mouth/Throat:      Pharynx: No oropharyngeal exudate.   Eyes:      General: No scleral icterus.     Pupils: Pupils are equal, round, and reactive to light.   Cardiovascular:      Rate and Rhythm: Normal rate and regular rhythm.      Heart sounds: Normal heart sounds.   Pulmonary:      Effort: No respiratory distress.      Breath sounds: Normal breath sounds.   Abdominal:      Palpations: Abdomen is soft.      Tenderness: There is no abdominal tenderness. There is no guarding or rebound. Negative signs include Colbert's sign.   Musculoskeletal:         General: No tenderness.   Skin:     General: Skin is warm.      Findings: No rash.   Neurological:      General: No focal deficit present.      Mental Status: She is alert.           LAB:  All pertinent labs reviewed and interpreted.  Labs Ordered and Resulted from Time of ED Arrival to Time of ED Departure - No data to display    RADIOLOGY:  Reviewed all pertinent imaging. Please see official radiology report.  No orders to display     I, Dorothy Valerio, am serving as a scribe to document services personally performed by Dr. Ajay Lemos, based on my observation and the provider's statements to me. IAjay MD attest that Dorothy Valerio is acting in a scribe capacity, has observed my performance of the services and has documented them in accordance with my direction.    Ajay Lemos M.D.  Emergency Medicine  [facility] EMERGENCY ROOM  [address] [address]  [phone]  Dept: [phone]     Ajay Lemos MD  12/07/22 [id number]

## 2022-12-08 NOTE — TELEPHONE ENCOUNTER
Mom trying to find a hospital follow up for pt. Suicidal ideation and depression. Appt scheduled for 12/12 with Dr. Sumit everett to schedule in 20 minute slot.

## 2022-12-08 NOTE — DISCHARGE INSTRUCTIONS
Aftercare Plan  Date: Thursday, 12/8/2022  Time: 11:00 am - 12:00 pm  Provider: Suly Cummings MA  [facility]  Location: Clinical [facility] Development[facility], [address], [facility] [address], [place] [address]  Phone: [phone]  Type: Teletherapy  Patient Instructions  Telehealth/Virtual services only. No in-person services. Not currently accepting BCBS or PreferredOne. New patients are contacted via phone/email by CDS office managers (to confirm information), before 1st appointment. Electronic device w/video capabilities required for services. New patients have e-paperwork to complete prior to 1st appointment (access to e-docs given by CDS  during intake). Call CDS w/questions [phone] or email [email or web address]    Date: Friday, 12/16/2022  Time: 1:30 pm - 2:30 pm  Provider: Patrick Schoenecker  MS  CNP,PMHNP,RN  Location: [address], [place] [address], [place] [address]  Phone: [phone]  Type: Telepsychiatry      If I am feeling unsafe or I am in a crisis, I will:   Contact my established care providers   Call the National Suicide Prevention Lifeline: 740.746.3378   Go to the nearest emergency room   Call 925     Warning signs that I or other people might notice when a crisis is developing for me:     I am having increasing suicidal thoughts that turn to plans with intent or means  I am having additional urges to self-harm    My emotions are of hopelessness; feeling like there's no way out.  Rage or anger.  Engaging in risky activities without thinking  Withdrawing from family/friends  Dramatic mood swings  Drastic personality changes   Use of alcohol or drugs  Postings on social media  Neglect of personal hygiene or cares      Things I am able to do on my own to cope or help me feel better:    Other things to Try:  Spending quality time with loved ones  Staying hydrated  Eating balanced meals  Going for a walk every day  Take care of daily  responsibilities/needs  Focus on positive self-talk vs negative self-talk     Things that I am able to do with others to cope or help me better:   Other things to Try:  Exercise  Music  Deep breathing  Meditations  Journal  Self-regulate  Self check-in  Ask for help     Things I can use or do for distraction:   Other things to Try:  Reach out to/spend time with family, friends  Shower  Exercise  Chores or do a project  Listen to music  Watch movie/TV  Listening to music  Journaling  Reading a book  Meditating  Call a friend     Changes I can make to support my mental health and wellness:    -I will abstain from all mood altering chemicals not currently prescribed to me   -I will attend scheduled mental health therapy and psychiatric appointments and follow all   recommendations  -I will commit to 30 minutes of self care daily - this can be as simple as taking a shower, going for a   walk, cooking a meal, read, writing, etc  -I will practice square breathing when I begin to feel anxious - in breath through the nose for the count   of 4 and the first line on the square. Out breath through the mouth for the count of 4 for the second line   of the square. Repeat to complete the square. Repeat the square as many times as needed.  - I will use distraction skills of: going for walks, watching TV, spending time outside, calling a friend or   family member  -Use community resources, including hotline numbers, [facility] crisis and support meetings  -Maintain a daily schedule/routine  -Practice deep breathing skills  -Download a meditation patricia and spend 15-20 minutes per day mediating/relaxing. Some apps to   download include: Calm, Headspace and Insight Timer. All 3 of these apps have free version     People in my life that I can ask for help:   Family  Friends      Your [facility] has a mental health crisis team you can call 24/7: [facility] Mobile Crisis  [phone]          Crisis Lines  Crisis Text Line  Text 658151   "You will be connected with a trained live crisis counselor to provide support.    Dianne auguste, texto  ANTOINETTE a 082573 o texto a 442-AYUDAME en WhatsApp    The Blake Project (LGBTQ Youth Crisis Line)  0.288.480.0196  text START to 677-678      Community Cartasite  Fast Tracker  Linking people to mental health and substance use disorder resources  ZapHour.[employer]     [place] Mental Health Warm Line  Peer to peer support  Monday thru Saturday, 12 pm to 10 pm  700.675.1248 or 9.903.504.7160  Text \"Support\" to 09496    National [place] on Mental Illness (JOSE)  163.543.7290 or 1.888.JOSE.HELPS      Mental Health Apps  My3  https://Jovie/    StudioTweets  https://Captain [email or web address]/apps/virtual-hope-box/      Crisis Lines  Crisis Text Line  Text 838268  You will be connected with a trained live crisis counselor to provide support.    Por susanaanol, texto  ANTOINETTE a 339440 o texto a 442-AYUDAME en WhatsApp    National Hope Line  1.800.SUICIDE [7581701]      [employer]  Fast Tracker  Linking people to mental health and substance use disorder resources  ZapHour.[employer]     [place] Mental Health Warm Line  Peer to peer support  Monday thru Saturday, 12 pm to 10 pm  116.634.8826 or 7.223.811.9852  Text \"Support\" to 77272    National [place] on Mental Illness (JOSE)  351.959.2543 or 1.888.JOSE.HELPS      Mental Health Apps  My3  https://WaveTec Vision.[employer]/    StudioTweets  https://Captain [email or web address]/apps/virtual-hope-box/      Additional Information  Today you were seen by a licensed mental health professional through Triage and Transition services, [facility] ([facility])  for a crisis assessment in the Emergency Department at [facility].  It is recommended that you follow up with your established providers (psychiatrist, mental health therapist, and/or primary care doctor - as relevant) as soon as possible. Coordinators from P will be calling you in the next 24-48 hours " to ensure that you have the resources you need.  You can also contact [facility] coordinators directly at [phone]. You may have been scheduled for or offered an appointment with a mental health provider. [facility] maintains an extensive network of licensed behavioral health providers to connect patients with the services they need.  We do not charge providers a fee to participate in our referral network.  We match patients with providers based on a patient's specific needs, insurance coverage, and location.  Our first effort will be to refer you to a provider within your care system, and will utilize providers outside your care system as needed.

## 2022-12-09 ENCOUNTER — TELEPHONE (OUTPATIENT)
Dept: BEHAVIORAL HEALTH | Facility: CLINIC | Age: 22
End: 2022-12-09

## 2022-12-09 ENCOUNTER — PATIENT OUTREACH (OUTPATIENT)
Dept: CARE COORDINATION | Facility: CLINIC | Age: 22
End: 2022-12-09

## 2022-12-09 NOTE — PROGRESS NOTES
Contact   Chart Review     Situation: Patient chart reviewed by .    Background: Discharged from [facility] ED on 12-7 where she was seen for suicidal ideation. Patient has sent a message to PCP to get help with finding a counselor.     Assessment: Talked to patient and mom. Patient will be home for a few weeks until she returns to college in [employer]. She had a follow up appt with a therapist and she did not feel a good connection to this therapist. Was interested in finding other others.  She is opn to face to face at this time until she returns to college when it would turn to virtual.  She is open to either male of female therapists. Reviewed how to set up appt and questions she would be asked.  Gave two options including [facility].  She wants to look at their websites and review options and will call when she is ready.      Reviewed transition clinic and she would like that support until she gets established with ongoing therapy.  She asked for a referral to be made and she will wait for the call.  The mobile number is her direct number.  She declined working with care coordination at this time.    Plan/Recommendations: patient to call with any questions.  Transition clinic referral made.  Letter sent.     Samantha Ceja,   [facility]  [phone]

## 2022-12-09 NOTE — TELEPHONE ENCOUNTER
Writer spoke with pt and scheduled initial TC therapy appointment on 12/12/22 @ 10:00 am. Writer sent intake documents via [employer]. Writer will reply to referral source.Tracker completed.    Maura Machuca  12/09/22  1212    ----- Message from SEPIDEH Adam sent at 12/9/2022 11:54 AM CST -----  Regarding: therapy  Transition Clinic Referral   [place]         Please Check Type of Referral Requested:       ___x_THERAPY: The Transition clinic is able to schedule patients without current medical insurance; these patient will be referred to our Social Work Care Coordinator for Medical Insurance              Assistance. We are open for referral for psychotherapy. Patient is referred from:  PCP at [facility]      ____MEDICATION:  Referrals for Medication are ONLY accepted from the following areas      GUARDIAN: If your patient is not their own Guardian, please provide the following:    Guardian Name:  Guardian Contact Information (Phone & Email) :  Guardian Address:     FOSTER CARE PROVIDER: If your patient lives at a Licensed Foster Care, please provide the following:    Foster Provider:  Foster Provider Contact Information (Phone & Email):  Foster Provider Address:         Referring Provider Contact Name: jesenia kenzie; Phone Number: [phone]    Reason for Transition Clinic Referral: follow up for ED for SI symptoms.     Next Level of Care Patient Will Be Transitioned To: ongoing therapy  Provider(s)setting up.  Location tbd  Date/Time tbd    What Would Be Helpful from the Transition Clinic: summary in chart notes.  She is ok with either male or female.  Cell number on epic is her number. Mom's is the starred number. Contact her directly.      Needs: NO    Does Patient Have Access to Technology: yes    Patient E-mail Address: [email or web address]    Current Patient Phone Number: [phone]    Clinician Gender Preference (if applicable): NO    Patient location preference:  Virtual    SEPIDEH Adam

## 2022-12-10 ENCOUNTER — HOSPITAL ENCOUNTER (EMERGENCY)
Facility: CLINIC | Age: 22
Discharge: HOME | End: 2022-12-11
Attending: EMERGENCY MEDICINE | Admitting: EMERGENCY MEDICINE
Payer: COMMERCIAL

## 2022-12-10 DIAGNOSIS — F41.9 ANXIETY: ICD-10-CM

## 2022-12-10 DIAGNOSIS — R45.851 SUICIDAL IDEATION: ICD-10-CM

## 2022-12-10 PROCEDURE — 99285 EMERGENCY DEPT VISIT HI MDM: CPT | Mod: 25

## 2022-12-10 ASSESSMENT — ACTIVITIES OF DAILY LIVING (ADL): ADLS_ACUITY_SCORE: 33

## 2022-12-11 VITALS
DIASTOLIC BLOOD PRESSURE: 54 MMHG | HEART RATE: 88 BPM | BODY MASS INDEX: 21.61 KG/M2 | OXYGEN SATURATION: 99 % | WEIGHT: 122 LBS | RESPIRATION RATE: 16 BRPM | SYSTOLIC BLOOD PRESSURE: 94 MMHG | TEMPERATURE: 98.2 F

## 2022-12-11 PROCEDURE — 90791 PSYCH DIAGNOSTIC EVALUATION: CPT

## 2022-12-11 PROCEDURE — 250N000013 HC RX MED GY IP 250 OP 250 PS 637: Performed by: EMERGENCY MEDICINE

## 2022-12-11 RX ORDER — LORAZEPAM 1 MG/1
1 TABLET ORAL ONCE
Status: COMPLETED | OUTPATIENT
Start: 2022-12-11 | End: 2022-12-11

## 2022-12-11 RX ORDER — DIAZEPAM 5 MG
5 TABLET ORAL EVERY 6 HOURS PRN
Qty: 15 TABLET | Refills: 0 | Status: SHIPPED | OUTPATIENT
Start: 2022-12-11 | End: 2022-12-28

## 2022-12-11 RX ADMIN — LORAZEPAM 1 MG: 1 TABLET ORAL at 08:38

## 2022-12-11 ASSESSMENT — ACTIVITIES OF DAILY LIVING (ADL)
ADLS_ACUITY_SCORE: 35

## 2022-12-11 ASSESSMENT — COLUMBIA-SUICIDE SEVERITY RATING SCALE - C-SSRS
6. HAVE YOU EVER DONE ANYTHING, STARTED TO DO ANYTHING, OR PREPARED TO DO ANYTHING TO END YOUR LIFE?: NO
1. SINCE LAST CONTACT, HAVE YOU WISHED YOU WERE DEAD OR WISHED YOU COULD GO TO SLEEP AND NOT WAKE UP?: YES
2. HAVE YOU ACTUALLY HAD ANY THOUGHTS OF KILLING YOURSELF?: YES
TOTAL  NUMBER OF ABORTED OR SELF INTERRUPTED ATTEMPTS SINCE LAST CONTACT: NO
TOTAL  NUMBER OF INTERRUPTED ATTEMPTS SINCE LAST CONTACT: NO
5. HAVE YOU STARTED TO WORK OUT OR WORKED OUT THE DETAILS OF HOW TO KILL YOURSELF? DO YOU INTEND TO CARRY OUT THIS PLAN?: NO
REASONS FOR IDEATION SINCE LAST CONTACT: MOSTLY TO END OR STOP THE PAIN (YOU COULDN'T GO ON LIVING WITH THE PAIN OR HOW YOU WERE FEELING)
ATTEMPT SINCE LAST CONTACT: NO

## 2022-12-11 NOTE — ED NOTES
AIDET performed, white board updated for rounding. Patient updated on plan of care. Patient's pain assessed, 0/10. Bed in low position, side rails up. Visitor at bedside: Mother. Patient presents voluntarily with her mother complaining of suicidal thoughts. States that she is very overwhelmed and stressed. Also reports recent cancer and breakup with her significant other. 1:1 initiated due to suicidal thoughts. Denies having a plan to end her life. Mother supportive to patient and will be rooming overnight.

## 2022-12-11 NOTE — ED TRIAGE NOTES
Pt arrives accompanied by mother. Pt reports SI with no active plan today. Recently in the ED and left on a safety plan and Hydroxyzine. She reports the thoughts are not going away and she does not feel safe with herself.  Pt reports having a stressful, hard year and had a recent break up. Denies homicidal ideation.      Triage Assessment       Row Name 12/10/22 1911       Triage Assessment (Adult)    Airway WDL WDL       Respiratory WDL    Respiratory WDL WDL       Skin Circulation/Temperature WDL    Skin Circulation/Temperature WDL WDL       Cardiac WDL    Cardiac WDL WDL       Peripheral/Neurovascular WDL    Peripheral Neurovascular WDL WDL       Cognitive/Neuro/Behavioral WDL    Cognitive/Neuro/Behavioral WDL mood/behavior;X    Mood/Behavior withdrawn;flat affect;anxious       Bulan Coma Scale    Best Eye Response 4-->(E4) spontaneous    Best Motor Response 6-->(M6) obeys commands    Best Verbal Response 5-->(V5) oriented    Angie Coma Scale Score 15

## 2022-12-11 NOTE — DISCHARGE INSTRUCTIONS
Follow-up with the appointments  with the therapist and mental health professional set up by the  today in the ER.    As dicussed, you have a therapy appointment tomorrow 12/12 at 10:00 am with Rossy from the Transition Clinic. This is a virtual appointment. A link will be sent to your phone/e-mail prior to appointment.   You also have an appointment with Delmy Arana MD tomorrow 12/12 at 1:00 pm at the [facility].  You have an appointment with Patrick Schoenecker at [facility] on 12/13 @ 1:00 pm   [address]  [place] [address]  [phone]    Aftercare Plan  If I am feeling unsafe or I am in a crisis, I will:   Contact my established care providers   Call the National Suicide Prevention Lifeline: 988  Go to the nearest emergency room   Call 911     Warning signs that I or other people might notice when a crisis is developing for me:   Increasing suicidal thoughts that turn to plans with intent or means  Engaging in risky activities without thinking  Withdrawing from family/friends  Dramatic mood swings  Drastic personality changes  Postings on social media  Neglect of personal hygiene or cares     Things I am able to do on my own to cope or help me feel better:    Download a meditation patricia and spend 15-20 minutes per day mediating/relaxing. Some apps to download include: Calm, Headspace and Insight Timer. All 3 of these apps have free version  Use distraction skills of: going for walks, watching TV, spending time outside, calling a friend or family member    Things I can use or do for distraction:   Reach out to/spend time with family, friends  Shower/bath  Exercise  Chores or do a project  Listen to music  Watch movie/TV  Journaling  Reading a book    Changes I can make to support my mental health and wellness:   Attend scheduled mental health therapy and psychiatric appointments and follow all recommendations  Commit to 30 minutes of self care daily -  "this can be as simple as taking a shower, going for a walk, cooking a meal, read, writing, etc  Maintain a daily schedule/routine  Practice deep breathing skills    People in my life that I can ask for help:   Mom, friends, doctors, therapist, school counselors     Your [facility] hospital has a mental health crisis team you can call 24/7: [facility] Mobile Crisis  [phone]    Additional resources and information:         Crisis Lines  Crisis Text Line  Text 671557  You will be connected with a trained live crisis counselor to provide support.    Por espanol, texto  ANTOINETTE a 821458 o texto a 442-AYUDAME en WhatsApp    The Blake Project (LGBTQ Youth Crisis Line)  7.980.432.9706  text START to 582-374      Community Resources  Fast Tracker  Linking people to mental health and substance use disorder resources  [employer]Kan.[employer]a     [place] Mental Health Warm Line  Peer to peer support  Monday thru Saturday, 12 pm to 10 pm  615.175.6772 or 0.268.262.1459  Text \"Support\" to 15949    National [place] on Mental Illness (JOSE)  168.626.9568 or 1.888.JOSE.HELPS      Mental Health Apps  My3  https://TigerTextpp.org/    VirtualHopeBox  https://wireLawyer.org/apps/virtual-hope-box/      Additional Information  Today you were seen by a licensed mental health professional through Triage and Transition services, [facility] (P)  for a crisis assessment in the Emergency Department at [facility].  It is recommended that you follow up with your established providers (psychiatrist, mental health therapist, and/or primary care doctor - as relevant) as soon as possible. Coordinators from [facility] will be calling you in the next 24-48 hours to ensure that you have the resources you need.  You can also contact [facility] coordinators directly at [phone]. You may have been scheduled for or offered an appointment with a mental health provider. [facility] maintains an extensive network of licensed behavioral health " providers to connect patients with the services they need.  We do not charge providers a fee to participate in our referral network.  We match patients with providers based on a patient's specific needs, insurance coverage, and location.  Our first effort will be to refer you to a provider within your care system, and will utilize providers outside your care system as needed.

## 2022-12-11 NOTE — CONSULTS
Diagnostic Evaluation Consultation  Crisis Assessment    Patient was assessed: Sweetie  Patient location: [facility] ED  Was a release of information signed: No. Reason: no current providers      Referral Data and Chief Complaint  Na is a 22 year old, who uses she/her pronouns, and presents to the ED with family/friends. Patient is referred to the ED by self. Patient is presenting to the ED for the following concerns: anxiety and depression.      Informed Consent and Assessment Methods  Patient is her own guardian. Writer met with patient and explained the crisis assessment process, including applicable information disclosures and limits to confidentiality, assessed understanding of the process, and obtained consent to proceed with the assessment. Patient was observed to be able to participate in the assessment as evidenced by oriented x5, coherent, logical thought process. Assessment methods included conducting a formal interview with patient, review of medical records, collaboration with medical staff, and obtaining relevant collateral information from family and community providers when available..     Over the course of this crisis assessment provided reassurance, offered validation, engaged patient in problem solving and disposition planning and worked with patient on safety and aftercare planning. Patient's response to interventions was minimal. She displayed frustration.     Summary of Patient Situation  Patient has been feeling more down and depressed for the last several weeks; she had two previous ED visits, both on 12/7 which recommended outpatient follow-up. She presented again last night with her mom stating 'I still felt super anxious and just not have the best thoughts again.' She reports that she met with a therapist on Friday but did not feel comfortable or connected. She has tried the Hydroxyzine that was prescribed at previous visits but is not finding if particularly helpful. She has not felt  any relief. Cannot identify any specific trigger but reports that overall this year has been really stressful for her since she was diagnosed with cancer, had a stressful school year and a recent break-up. Reports thoughts of suicide but has no specific plan today. Reports recent thoughts of taking pills; mom reports that all medications in their house have been locked up. No hx suicide attempts. During assessment, pt appears irritable with mom and writer, she expresses frustration that nothing is helping. Did encourage her to attend appointments this coming week with therapy and medication providers.     Brief Psychosocial History  Patient is a senior in college at [employer]. 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 When not in school she lives with her parents in [place]. She has 4 sisters all either out of the house or in college.     Significant Clinical History  Pt diagnosed with cancer earlier this year which has been contributing to their mental health. The patient notes they were on wellbutrin about a year ago, but stopped taking it because they felt it decreased their productivity. The patient denies prior mental health hospitalizations or suicide attempts. The patient tried to seek help up near college but found it difficult so returned here with family.      Collateral Information  Mom is present in ED. She reports that they have locked up all medications and have been staying at home with patient at all times. They are ok to have patient return home and follow-up with appointments scheduled for tomorrow.      Risk Assessment  [place] Suicide Severity Rating Scale Full Clinical Version: 12/7/22    [place] Suicide Severity Rating Scale Since Last Contact: 12/11/22  Suicidal Ideation (Since Last Contact)  1. Wish to be Dead (Since Last Contact): Yes  2. Non-Specific Active Suicidal Thoughts (Since Last Contact): Yes  3. Active Suicidal Ideation with any Methods (Not Plan)  Without Intent to Act (Since Last Contact): No  5. Active Suicidal Ideation with Specific Plan and Intent (Since Last Contact): No  Suicidal Behavior (Since Last Contact)  Actual Attempt (Since Last Contact): No  Has subject engaged in non-suicidal self-injurious behavior? (Since Last Contact): No  Interrupted Attempts (Since Last Contact): No  Aborted or Self-Interrupted Attempt (Since Last Contact): No  Preparatory Acts or Behavior (Since Last Contact): No     C-SSRS Risk (Since Last Contact)  Calculated C-SSRS Risk Score (Since Last Contact): Low Risk    Validity of evaluation is not impacted by presenting factors during interview.   Comments regarding subjective versus objective responses to Neffs tool: N/A  Environmental or Psychosocial Events: new diagnosis of major illness and recent life events: break-up  Chronic Risk Factors: parental mental health issue   Warning Signs: hopelessness and anxiety, agitation, unable to sleep, sleeping all the time  Protective Factors: strong bond to family unit, community support, or employment, responsibilities and duties to others, including pets and children, lives in a responsibly safe and stable environment, help seeking and reality testing ability  Interpretation of Risk Scoring, Risk Mitigation Interventions and Safety Plan:  Pt appears low risk from both [place] and direct observations/assessment.     Does the patient have thoughts of harming others? No  Is the patient engaging in sexually inappropriate behavior?  no        Current Substance Abuse  Is there recent substance abuse? no; reports occasional alcohol use  Was a urine drug screen or blood alcohol level obtained: No       Mental Status Exam   Affect: Flat   Appearance: Appropriate    Attention Span/Concentration: Attentive  Eye Contact: Intense   Fund of Knowledge: Appropriate    Language /Speech Content: Fluent   Language /Speech Volume: Normal    Language /Speech Rate/Productions: Normal    Recent  Memory: Intact   Remote Memory: Intact   Mood: Depressed and Irritable    Orientation to Person: Yes    Orientation to Place: Yes   Orientation to Time of Day: Yes    Orientation to Date: Yes    Situation (Do they understand why they are here?): Yes    Psychomotor Behavior: Normal    Thought Content: Clear   Thought Form: Intact      History of commitment: No       Medication  Psychotropic medications: Hydroxyzine  Medication changes made in the last two weeks: Yes: prescribed Hydroxyzine in the ED.       Current Care Team  Primary Care Provider: No  Psychiatrist: No  Therapist: No  : No     CTSS or ARMHS: No  ACT Team: No  Other: No      Diagnosis    F43.23 Adjustment Disorder with anxiety and depressive symptoms    Clinical Summary and Substantiation of Recommendations    Pt with several recent ED visits for increasing anxiety and depression with intermittent thoughts of suicide, mostly of a passive nature. Denies current plan; had previously thought of taking pills. Pills are locked up at her home per mother. Pt has been referred for outpatient follow-up but is finding it difficult to wait. She was reminded of appointments set for tomorrow as well as later this week. Did discuss possible option of inpatient MH if she felt she could not keep herself safe but she agreed to go home and follow-up. She is assessed to be low risk at this time.   Disposition  Recommended disposition: Individual Therapy and Medication Management       Reviewed case and recommendations with attending provider. Attending Name: Ham Loya MD       Attending concurs with disposition: Yes       Patient concurs with disposition: Yes       Guardian concurs with disposition: NA      Final disposition: Individual therapy  and Medication management.     Outpatient Details (if applicable):   Aftercare plan and appointments placed in the AVS and provided to patient: Yes. Given to patient by ED staff  Was lethal means counseling  provided as a part of aftercare planning? Yes - describe medications are locked up per mother.       Assessment Details  Patient interview started at: 730 am and completed at: 745 am.     Total duration spent on the patient case in minutes: .25 hrs      CPT code(s) utilized: 58894 - Psychotherapy for Crisis - 60 (30-74*) min       Eunice Pham, LICSW, MSW, LICSW, Psychotherapist  DEC - Triage & Transition Services  Callback: [phone]      Aftercare Plan  If I am feeling unsafe or I am in a crisis, I will:   Contact my established care providers   Call the National Suicide Prevention Lifeline: 988  Go to the nearest emergency room   Call 911     Warning signs that I or other people might notice when a crisis is developing for me:   Increasing suicidal thoughts that turn to plans with intent or means  Engaging in risky activities without thinking  Withdrawing from family/friends  Dramatic mood swings  Drastic personality changes  Postings on social media  Neglect of personal hygiene or cares     Things I am able to do on my own to cope or help me feel better:    Download a meditation patricia and spend 15-20 minutes per day mediating/relaxing. Some apps to download include: Calm, Headspace and Insight Timer. All 3 of these apps have free version  Use distraction skills of: going for walks, watching TV, spending time outside, calling a friend or family member    Things I can use or do for distraction:   Reach out to/spend time with family, friends  Shower/bath  Exercise  Chores or do a project  Listen to music  Watch movie/TV  Journaling  Reading a book    Changes I can make to support my mental health and wellness:   Attend scheduled mental health therapy and psychiatric appointments and follow all recommendations  Commit to 30 minutes of self care daily - this can be as simple as taking a shower, going for a walk, cooking a meal, read, writing, etc  Maintain a daily schedule/routine  Practice deep breathing  "skills    People in my life that I can ask for help:   Mom, friends, doctors, therapist, school counselors     Your [facility] has a mental health crisis team you can call 24/7: [facility] Mobile Crisis  [phone]    Additional resources and information:         Crisis Lines  Crisis Text Line  Text 035254  You will be connected with a trained live crisis counselor to provide support.    Por espanol, texto  ANTOINETTE a 466653 o texto a 442-AYUDAME en WhatsApp    The [employer] (LGBTQ Youth Crisis Line)  1.142.093.3344  text START to 413-948      LumiFold  Fast Tracker  Linking people to mental health and substance use disorder resources  MoosCool.Nuggeta     [place] Mental Health Warm Line  Peer to peer support  Monday thru Saturday, 12 pm to 10 pm  595.218.7624 or 4.674.250.9075  Text \"Support\" to 60293    National [place] on Mental Illness (JOSE)  093.208.9578 or 1.881.JOSE.HELPS      Mental Health Apps  My3  https://[employer].org/    VirtualHopeBox  https://[employer]org/apps/virtual-hope-box/      Additional Information  Today you were seen by a licensed mental health professional through Triage and Transition services, Behavioral Healthcare Providers ([facility])  for a crisis assessment in the Emergency Department at [facility].  It is recommended that you follow up with your established providers (psychiatrist, mental health therapist, and/or primary care doctor - as relevant) as soon as possible. Coordinators from [facility] will be calling you in the next 24-48 hours to ensure that you have the resources you need.  You can also contact [facility] coordinators directly at [phone]. You may have been scheduled for or offered an appointment with a mental health provider. [facility] maintains an extensive network of licensed behavioral health providers to connect patients with the services they need.  We do not charge providers a fee to participate in our referral network.  We match patients " with providers based on a patient's specific needs, insurance coverage, and location.  Our first effort will be to refer you to a provider within your care system, and will utilize providers outside your care system as needed.

## 2022-12-11 NOTE — ED NOTES
[facility] ED Mental Health Handoff Note:     Assuming care from: Dr Mayra Woo    Brief HPI: 22 year old female signed out to me by the above provider. See initial ED Provider note for full details of the presentation.   In brief, patient worsening depression and suicidal ideation.  This is her third visit to the emergency department for this complaint since December 7. She has been under significant stress over the past year since she was diagnosed with cancer, stressful school year and then she broke up with her boyfriend recently as well. She does not have a specific plan today, but previously had plan to overdose on pills.    Home meds reviewed and ordered/administered: No  Medically stable for inpatient mental health admission: Yes.  Evaluated by mental health: No. Patient is clinically sober and awaiting evaluation for disposition.  Safety concerns: At the time I received sign out, there were no safety concerns.    Hold Status:  Active Orders   N/A       Labs/Imaging:   Results for orders placed or performed during the hospital encounter of 12/10/22 (from the past 24 hour(s))   Urine Drugs of Abuse Screen Panel 1 - Drug Screen (Full) *Canceled*     Status: None ()    Collection Time: 12/11/22 12:16 AM    Narrative    The following orders were created for panel order Urine Drugs of Abuse Screen Panel 1 - Drug Screen (Full).  Procedure                               Abnormality         Status                     ---------                               -----------         ------                       Please view results for these tests on the individual orders.       ED Meds:  Medications   LORazepam (ATIVAN) tablet 1 mg (1 mg Oral Given 12/11/22 0838)     No orders to display       ED Course:  ED Course as of 12/11/22 1336   Sun Dec 11, 2022   0750 Discussed plan with DEC .   0830 Evaluated the patient and updated on plan for discharge.       There were significant events during my shift  - patient discharged.    Impression:    ICD-10-CM    1. Suicidal ideation  R45.851       2. Anxiety  F41.9           Plan:    1. Discharged.    Ham Loya MD  [facility] EMERGENCY ROOM  [address] [facility] [address] [phone]

## 2022-12-11 NOTE — PROGRESS NOTES
Resting without observed distress. Mother at bedside and offers support to patient. Plan is for evaluation this morning, 12/11/22, and then transfer to appropriate site. Appropriate and cooperative. Provider instructed that labs are to be obtained when patient is awake and to not wake her to obtain. Continue 1:1.

## 2022-12-11 NOTE — ED PROVIDER NOTES
EMERGENCY DEPARTMENT ENCOUnter      NAME: Na De León  AGE: 22 year old female  YOB: 2000  MRN: [id number]  EVALUATION DATE & TIME: 12/10/2022 10:49 PM    PCP: Frances Don    ED PROVIDER: Mayra Woo MD      Chief Complaint   Patient presents with     Suicidal         FINAL IMPRESSION:  1. Suicidal ideation          ED COURSE & MEDICAL DECISION MAKING:      In summary, the patient is a 22-year-old female that presents to the emergency department for evaluation of worsening depression and suicidal ideation.  This is her third visit to the emergency department for this complaint since December 7.    11:00 PM I met with the patient, obtained history, performed an initial exam, and discussed options and plan for diagnostics and treatment here in the ED. PPE worn: n95 mask  and nitrile gloves.  Previous medical records were reviewed.    Patient is medically clear for behavioral health admission.  0630-Signed out at change of shift with DEC assessment pending.      At the conclusion of the encounter I discussed the results of all of the tests and the disposition. The questions were answered. The patient or family acknowledged understanding and was agreeable with the care plan.         MEDICATIONS GIVEN IN THE EMERGENCY:  Medications - No data to display    NEW PRESCRIPTIONS STARTED AT TODAY'S ER VISIT  New Prescriptions    No medications on file          =================================================================    HPI        Na De León is a 22 year old female with a pertinent history of anxiety, prior concussion, who presents to this ED via walk in with moter for evaluation of suicidal ideation.    Patient has been feeling more down and depressed today and presents with her mother. She was evaluated last week for suicidal thoughts where they prescribed her Hydroxyzine. She did try taking it but it did not provide any relief. She was also not able to get any  "appointment set up with a therapist. She says that this year overall has been really stressful for her since she was diagnosed with cancer, stressful school year and then she broke up with her boyfriend recently as well which topped her emotions off. Today, she states \"I don't want to be here\". But has no specific plan today but used to with plan to overdose on pills. The only pills she has prescribed is migraine medications. No known allergies to medications. She does not smoke but does drink occasionally. No recreational drug use. No other medical complaints at this time.       REVIEW OF SYSTEMS     Constitutional:  Denies fever or chills  HENT:  Denies sore throat   Respiratory:  Denies cough or shortness of breath   Cardiovascular:  Denies chest pain or palpitations  GI:  Denies abdominal pain, nausea, or vomiting  Musculoskeletal:  Denies any new extremity pain   Skin:  Denies rash   Neurologic:  Denies headache, focal weakness or sensory changes    Psych: Positive suicidal ideation, anxiety. 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 Indocyanine green (ICG) angiography.;  Surgeon: Jacinto Campbell MD;  Location:  OR     DENTAL SURGERY      wisdom teeth           CURRENT MEDICATIONS:    acetaminophen (TYLENOL) 500 MG tablet  buPROPion (WELLBUTRIN XL) 150 MG 24 hr tablet  butalbital-acetaminophen-caffeine (ESGIC) -40 MG tablet  hydrOXYzine (ATARAX) 25 MG tablet  ibuprofen (ADVIL/MOTRIN) 400 MG tablet  spironolactone (ALDACTONE) 50 MG tablet  Sulfacetamide Sodium-Sulfur 10-5 % LIQD  Sulfacetamide " Sodium-Sulfur 10-5 % SUSP  tretinoin (RETIN-A) 0.025 % external cream        ALLERGIES:  Allergies   Allergen Reactions     Grass Extracts [Gramineae Pollens]      Other reaction(s): Other (see comments)  Seasonal     Other Environmental Allergy Unknown     Seasonal       FAMILY HISTORY:  Family History   Problem Relation Age of Onset     Basal cell carcinoma Mother      Hyperlipidemia Father      Anxiety Disorder Sister      Anxiety Disorder Sister      No Known Problems Sister      Chronic Obstructive Pulmonary Disease Maternal Grandmother      Rheumatoid Arthritis Maternal Grandmother      Hypertension Maternal Grandmother      Depression Maternal Grandfather      Alzheimer Disease Maternal Grandfather      Prostate Cancer Maternal Grandfather      Pacemaker Paternal Grandmother      Deep Vein Thrombosis (DVT) No family hx of        SOCIAL HISTORY:   Social History     Socioeconomic History     Marital status: Single     Spouse name: None     Number of children: None     Years of education: None     Highest education level: None   Tobacco Use     Smoking status: Never     Smokeless tobacco: Never   Substance and Sexual Activity     Alcohol use: Yes     Drug use: No     Sexual activity: Yes     Partners: Male     Birth control/protection: Implant   Social History Narrative    Lives at home with mother, father and 3 sisters.       VITALS:  Patient Vitals for the past 24 hrs:   BP Temp Temp src Pulse Resp SpO2 Weight   12/10/22 2239 123/81 98.2  F (36.8  C) Oral 91 16 96 % 55.3 kg (122 lb)       PHYSICAL EXAM    Constitutional:  Well developed, Well nourished,  HENT:  Normocephalic, Atraumatic, Bilateral external ears normal, Oropharynx moist, Nose normal.   Neck:  Normal range of motion, No meningismus, No stridor.   Eyes:  EOMI, Conjunctiva normal, No discharge.   Respiratory:  Normal breath sounds, No respiratory distress, No wheezing, No chest tenderness.   Cardiovascular:  Normal heart rate, Normal rhythm, No  murmurs  GI:  Soft, No tenderness, No guarding,    Musculoskeletal:  Neurovascularly intact distally, No edema, No tenderness, No cyanosis, Good range of motion in all major joints.   Integument:  Warm, Dry, No erythema, No rash.   Lymphatic:  No lymphadenopathy noted.   Neurologic:  Alert & oriented x 3, Normal motor function,  No focal deficits noted.   Psychiatric:  Affect depressed, Judgment normal, Mood normal.               I, Olga Birch, am serving as a scribe to document services personally performed by Dr. Woo based on my observation and the provider's statements to me. I, Mayra Woo MD attest that Olgathomas Lezamag is acting in a scribe capacity, has observed my performance of the services and has documented them in accordance with my direction.    Mayra Woo MD  Emergency Medicine  [facility] EMERGENCY ROOM  [address] [id number] [phone]  Dept: [phone]     Mayra Woo MD  12/11/22 0629

## 2022-12-12 ENCOUNTER — HOSPITAL ENCOUNTER (OUTPATIENT)
Dept: BEHAVIORAL HEALTH | Facility: CLINIC | Age: 22
Discharge: HOME | End: 2022-12-12
Attending: FAMILY MEDICINE
Payer: COMMERCIAL

## 2022-12-12 ENCOUNTER — TELEPHONE (OUTPATIENT)
Dept: BEHAVIORAL HEALTH | Facility: CLINIC | Age: 22
End: 2022-12-12

## 2022-12-12 ENCOUNTER — OFFICE VISIT (OUTPATIENT)
Dept: FAMILY MEDICINE | Facility: CLINIC | Age: 22
End: 2022-12-12
Payer: COMMERCIAL

## 2022-12-12 VITALS
WEIGHT: 120.6 LBS | DIASTOLIC BLOOD PRESSURE: 58 MMHG | BODY MASS INDEX: 21.36 KG/M2 | HEART RATE: 100 BPM | TEMPERATURE: 99.1 F | SYSTOLIC BLOOD PRESSURE: 90 MMHG | OXYGEN SATURATION: 98 %

## 2022-12-12 DIAGNOSIS — D64.9 ANEMIA, UNSPECIFIED TYPE: ICD-10-CM

## 2022-12-12 DIAGNOSIS — R45.851 SUICIDAL IDEATION: Primary | ICD-10-CM

## 2022-12-12 DIAGNOSIS — F41.9 ANXIETY: ICD-10-CM

## 2022-12-12 DIAGNOSIS — F32.2 SEVERE MAJOR DEPRESSION (H): ICD-10-CM

## 2022-12-12 LAB
ERYTHROCYTE [DISTWIDTH] IN BLOOD BY AUTOMATED COUNT: 12.7 % (ref 10–15)
HCT VFR BLD AUTO: 39.4 % (ref 35–47)
HGB BLD-MCNC: 13.6 G/DL (ref 11.7–15.7)
MCH RBC QN AUTO: 29.7 PG (ref 26.5–33)
MCHC RBC AUTO-ENTMCNC: 34.5 G/DL (ref 31.5–36.5)
MCV RBC AUTO: 86 FL (ref 78–100)
PLATELET # BLD AUTO: 260 10E3/UL (ref 150–450)
RBC # BLD AUTO: 4.58 10E6/UL (ref 3.8–5.2)
TSH SERPL DL<=0.005 MIU/L-ACNC: 1.97 UIU/ML (ref 0.3–4.2)
WBC # BLD AUTO: 4.6 10E3/UL (ref 4–11)

## 2022-12-12 PROCEDURE — 82306 VITAMIN D 25 HYDROXY: CPT | Performed by: FAMILY MEDICINE

## 2022-12-12 PROCEDURE — 90471 IMMUNIZATION ADMIN: CPT | Performed by: FAMILY MEDICINE

## 2022-12-12 PROCEDURE — 84443 ASSAY THYROID STIM HORMONE: CPT | Performed by: FAMILY MEDICINE

## 2022-12-12 PROCEDURE — 99214 OFFICE O/P EST MOD 30 MIN: CPT | Mod: 25 | Performed by: FAMILY MEDICINE

## 2022-12-12 PROCEDURE — 90686 IIV4 VACC NO PRSV 0.5 ML IM: CPT | Performed by: FAMILY MEDICINE

## 2022-12-12 PROCEDURE — 85027 COMPLETE CBC AUTOMATED: CPT | Performed by: FAMILY MEDICINE

## 2022-12-12 PROCEDURE — 36415 COLL VENOUS BLD VENIPUNCTURE: CPT | Performed by: FAMILY MEDICINE

## 2022-12-12 PROCEDURE — 90791 PSYCH DIAGNOSTIC EVALUATION: CPT | Mod: GT | Performed by: PSYCHOLOGIST

## 2022-12-12 RX ORDER — ESCITALOPRAM OXALATE 10 MG/1
TABLET ORAL
Qty: 90 TABLET | Refills: 3 | Status: SHIPPED | OUTPATIENT
Start: 2022-12-12 | End: 2022-12-28

## 2022-12-12 ASSESSMENT — COLUMBIA-SUICIDE SEVERITY RATING SCALE - C-SSRS
4. HAVE YOU HAD THESE THOUGHTS AND HAD SOME INTENTION OF ACTING ON THEM?: NO
2. HAVE YOU ACTUALLY HAD ANY THOUGHTS OF KILLING YOURSELF IN THE PAST MONTH?: YES
6. HAVE YOU EVER DONE ANYTHING, STARTED TO DO ANYTHING, OR PREPARED TO DO ANYTHING TO END YOUR LIFE?: NO
5. HAVE YOU STARTED TO WORK OUT OR WORKED OUT THE DETAILS OF HOW TO KILL YOURSELF? DO YOU INTEND TO CARRY OUT THIS PLAN?: YES
1. IN THE PAST MONTH, HAVE YOU WISHED YOU WERE DEAD OR WISHED YOU COULD GO TO SLEEP AND NOT WAKE UP?: YES
3. HAVE YOU BEEN THINKING ABOUT HOW YOU MIGHT KILL YOURSELF?: YES

## 2022-12-12 ASSESSMENT — PATIENT HEALTH QUESTIONNAIRE - PHQ9
SUM OF ALL RESPONSES TO PHQ QUESTIONS 1-9: 24
10. IF YOU CHECKED OFF ANY PROBLEMS, HOW DIFFICULT HAVE THESE PROBLEMS MADE IT FOR YOU TO DO YOUR WORK, TAKE CARE OF THINGS AT HOME, OR GET ALONG WITH OTHER PEOPLE: VERY DIFFICULT
SUM OF ALL RESPONSES TO PHQ QUESTIONS 1-9: 24
SUM OF ALL RESPONSES TO PHQ QUESTIONS 1-9: 20
SUM OF ALL RESPONSES TO PHQ QUESTIONS 1-9: 20
10. IF YOU CHECKED OFF ANY PROBLEMS, HOW DIFFICULT HAVE THESE PROBLEMS MADE IT FOR YOU TO DO YOUR WORK, TAKE CARE OF THINGS AT HOME, OR GET ALONG WITH OTHER PEOPLE: VERY DIFFICULT

## 2022-12-12 ASSESSMENT — PAIN SCALES - GENERAL: PAINLEVEL: NO PAIN (0)

## 2022-12-12 NOTE — TELEPHONE ENCOUNTER
Writer confirmed 11:00 am appointment with assessment center and patients mother on phone. Writer sent teams message for scheduling.    Maura Machuca  12/12/22  417

## 2022-12-12 NOTE — PROGRESS NOTES
"[facility]      PATIENT'S NAME: Na De León  PREFERRED NAME: Na  PRONOUNS:     she her  MRN: [id number]  : 2000  ADDRESS: [address] [place] [address] [address] [place] [address]  [facility]. NUMBER:  [id number]  DATE OF SERVICE: 22  START TIME: 1100  END TIME: 1210  PREFERRED PHONE: [phone]  May we leave a program related message: Yes  SERVICE MODALITY:  Video Visit:      Provider verified identity through the following two step process.  Patient provided:  Patient  and Patient address    Telemedicine Visit: The patient's condition can be safely assessed and treated via synchronous audio and visual telemedicine encounter.      Reason for Telemedicine Visit: Services only offered telehealth    Originating Site (Patient Location): Patient's home    Distant Site (Provider Location): Provider Remote Setting- Home Office    Consent:  The patient/guardian has verbally consented to: the potential risks and benefits of telemedicine (video visit) versus in person care; bill my insurance or make self-payment for services provided; and responsibility for payment of non-covered services.     Patient would like the video invitation sent by:  My Chart    Mode of Communication:  Video Conference via [employer]    Distant Location (Provider):  Off-site    As the provider I attest to compliance with applicable laws and regulations related to telemedicine.    UNIVERSAL ADULT Mental Health DIAGNOSTIC ASSESSMENT    Identifying Information:  Patient is a 22 year old, individual.  Patient was referred for an assessment by self.  Chart appears to indicate pt was referred by [facility] unit.   Patient attended the session alone.    Chief Complaint:   The reason for seeking services at this time is: \"Mental Health\".  The problem(s) began 22.    Patient has attempted to resolve these concerns in the past through several ED visits .    Social/Family History:  Patient " "reported they grew up in [facility].  They were raised by biological parents.  Parents stayed .  Patient reported that their childhood was 'emotionally traumatic' ( I was held to a higher standard than the rest of my sisters.  I had this perfectionism.  Had to be perfect or felt emotionally shamed.  They say it is my fault, the perfection...  I would react to things differently.... I had sports in my life, good for the most part.\"  Patient described their current relationships with family of origin as gets along best with younger sister, the worst with second oldest sister who just moved to [facility].      The patient describes their cultural background as \"just a normal suburb life\".  She was raised [place], and \"forced to go to [employer]\".  \"It goes with being [place]\",  pt explained.   Cultural influences and impact on patient's life structure, values, norms, and healthcare: [place].  Contextual influences on patient's health include: Contextual Factors: Individual Factors had a cancer diagnosis this year.  Cultural, Contextual, and socioeconomic factors do not affect the patient's access to services.  These factors will be addressed in the Preliminary Treatment plan.  Patient identified their preferred language to be English. Patient reported they do not  need the assistance of an  or other support involved in therapy.     Patient reported had no significant delays in developmental tasks.   Patient's highest education level was she is in her senior year of college and on track to graduate May of 2023. Patient identified the following learning problems: none reported.  Modifications will not be used to assist communication in therapy.   Patient reports they are able to understand written materials.    Patient reported the following relationship history: her boyfriend of one year just broke up with her last week and she is very distressed about this.  This was her longest " relationship.   Patient's current relationship status is single for a week.   Patient identified their sexual orientation as heterosexual.  Patient reported having zero child(jazmin). Patient identified siblings and friends as part of their support system.  Patient identified the quality of these relationships as good.  Parents are very supportive too but she appears to feel the need to protect them as they are worried about her.  So, 'does not feel there is much room' for her feelings and emotions. Patient's current living/housing situation involves staying with parents.  They live with mom and dad for now and they report that housing is stable.  Pt plans to return to her college after winter break.    Patient is currently a full time student and works part time as a .  Patient reports their finances are obtained through employment and parents.  Patient does identify finances as a current stressor.      Patient reported that they have not been involved with the legal system.        Patient's Strengths and Limitations:  Patient identified the following strengths or resources that will help them succeed in treatment: friends / good social support, family support, intelligence and positive school connection. Things that may interfere with the patient's success in treatment include: none identified.     Assessments:  The following assessments were completed by patient for this visit:  PHQ9:   PHQ-9 SCORE 9/24/2020 3/9/2021 12/12/2022 12/12/2022 12/12/2022   PHQ-9 Total Score MyChart - - - 20 (Severe depression) 24 (Severe depression)   PHQ-9 Total Score 1 5 24 24 20     GAD7:   FAY-7 SCORE 9/24/2020 3/9/2021 12/12/2022   Total Score - - 18 (severe anxiety)   Total Score 8 1 18     CAGE-AID:   CAGE-AID Total Score 12/12/2022 12/12/2022   Total Score 0 0   Total Score MyChart - 0 (A total score of 2 or greater is considered clinically significant)     PROMIS 10-Global Health (all questions and answers displayed):    PROMIS 10 12/12/2022   In general, would you say your health is: Fair   In general, would you say your quality of life is: Poor   In general, how would you rate your physical health? Fair   In general, how would you rate your mental health, including your mood and your ability to think? Poor   In general, how would you rate your satisfaction with your social activities and relationships? Fair   In general, please rate how well you carry out your usual social activities and roles Poor   To what extent are you able to carry out your everyday physical activities such as walking, climbing stairs, carrying groceries, or moving a chair? A little   How often have you been bothered by emotional problems such as feeling anxious, depressed or irritable? Always   How would you rate your fatigue on average? Very severe   How would you rate your pain on average?   0 = No Pain  to  10 = Worst Imaginable Pain 6   In general, would you say your health is: 2   In general, would you say your quality of life is: 1   In general, how would you rate your physical health? 2   In general, how would you rate your mental health, including your mood and your ability to think? 1   In general, how would you rate your satisfaction with your social activities and relationships? 2   In general, please rate how well you carry out your usual social activities and roles. (This includes activities at home, at work and in your community, and responsibilities as a parent, child, spouse, employee, friend, etc.) 1   To what extent are you able to carry out your everyday physical activities such as walking, climbing stairs, carrying groceries, or moving a chair? 2   In the past 7 days, how often have you been bothered by emotional problems such as feeling anxious, depressed, or irritable? 5   In the past 7 days, how would you rate your fatigue on average? 5   In the past 7 days, how would you rate your pain on average, where 0 means no pain, and 10 means  worst imaginable pain? 6   Global Mental Health Score 5   Global Physical Health Score 8   PROMIS TOTAL - SUBSCORES 13   Some recent data might be hidden     [place] Suicide Severity Rating Scale (Lifetime/Recent)  [place] Suicide Severity Rating (Lifetime/Recent) 12/7/2022 12/10/2022 12/10/2022 12/12/2022   Q1 Wished to be Dead (Past Month) yes yes yes yes   Q2 Suicidal Thoughts (Past Month) yes yes yes yes   Q3 Suicidal Thought Method yes yes no yes   Q4 Suicidal Intent without Specific Plan no no no no   Q5 Suicide Intent with Specific Plan yes yes no yes   Q6 Suicide Behavior (Lifetime) yes yes no no   Within the Past 3 Months? no yes no no   Level of Risk per Screen high risk high risk low risk Low risk   1. Wish to be Dead (Lifetime) 1 - - -   1. Wish to be Dead (Past 1 Month) 1 - - 1   2. Non-Specific Active Suicidal Thoughts (Lifetime) 0 - - -   2. Non-Specific Active Suicidal Thoughts (Past 1 Month) - - - 1   3. Active Suicidal Ideation with any Methods (Not Plan) Without Intent to Act (Past 1 Month) - - - 1   4. Active Suicidal Ideation with Some Intent to Act, Without Specific Plan (Past 1 Month) - - - 1   5. Active Suicidal Ideation with Specific Plan and Intent (Past 1 Month) - - - 0   Most Severe Ideation Rating (Lifetime) 2 - - -   Description of Most Severe Ideation (Lifetime) started a few days ago passive SI - - -   Most Severe Ideation Rating (Past 1 Month) 2 - - -   Frequency (Lifetime) 1 - - -   Frequency (Past 1 Month) 3 - - -   Duration (Lifetime) 1 - - -   Duration (Past 1 Month) 2 - - -   Controllability (Lifetime) 1 - - -   Controllability (Past 1 Month) 2 - - -   Deterrents (Lifetime) 1 - - -   Deterrents (Past 1 Month) 1 - - -   Reasons for Ideation (Lifetime) 1 - - -   Reasons for Ideation (Past 1 Month) 1 - - -   Actual Attempt (Lifetime) 0 - - -   Has subject engaged in non-suicidal self-injurious behavior? (Lifetime) 0 - - -   Interrupted Attempts (Lifetime) 0 - - -   Aborted or  Self-Interrupted Attempt (Lifetime) 0 - - -   Preparatory Acts or Behavior (Lifetime) 0 - - -   Calculated C-SSRS Risk Score (Lifetime/Recent) Low Risk - - High Risk   PT has had suicidal thoughts in the past month but no attempts.      Personal and Family Medical History:  Patient does not report a family history of mental health concerns.  Patient reports family history includes Alzheimer Disease in her maternal grandfather; Anxiety Disorder in her sister and sister; Basal cell carcinoma in her mother; Chronic Obstructive Pulmonary Disease in her maternal grandmother; Depression in her maternal grandfather; Hyperlipidemia in her father; Hypertension in her maternal grandmother; No Known Problems in her sister; Pacemaker in her paternal grandmother; Prostate Cancer in her maternal grandfather; Rheumatoid Arthritis in her maternal grandmother..     Patient does not report Mental Health Diagnosis and/or Treatment.   Patient reported symptoms began this year for the depression and her anxiety has been present for many years.  Patient has not received mental health services in the past: Pt has had three ED visits recently and one referral for therapy was not a good match.  Her mother is actively trying to find a therapy appointment for pt and pt thinks she may have one as pt had to fill out paperwork for that appointment.   Psychiatric Hospitalizations:  None. Patient denies a history of civil commitment.  Currently, patient will be receiving other mental health services.  These include she reports she has a medication management appointment soon but mother knows info and pt does not have it.  Pt has PCP appointment today.  She reports she is supposed to be seeing a therapist through the transition clinic whom pt was scheduled with today but the provider is out ill and this writer was asked to do a DA.  Supervisor indicates that pt will be contacted to schedule follow up from the [facility].       Patient has  had a physical exam to rule out medical causes for current symptoms.  Date of last physical exam was within the past year. Client was encouraged to follow up with PCP if symptoms were to develop. The patient has a [facility] Primary Care Provider, who is named Frances Don.  Patient reports the following current medical concerns: recent hx of cancer dx with need for ongoing scans to monitor.  Patient denies any issues with pain.   There are not significant appetite / nutritional concerns / weight changes.  Pt does report her appetite has been diminished secondary to sadness over the loss of her relationship.  Patient does not report a history of head injury / trauma / cognitive impairment.      Patient reports current meds as:   Outpatient Medications Marked as Taking for the 12/12/22 encounter (Hospital Encounter) with Kelly Moore LICSW   Medication Sig     diazepam (VALIUM) 5 MG tablet Take 1 tablet (5 mg) by mouth every 6 hours as needed for anxiety or sleep       Medication Adherence:  Patient reports taking.  taking prescribed medications as prescribed.    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         Current Mental Status Exam:   Appearance:  Appropriate    Eye Contact:  Good   Psychomotor:  Normal       Gait / station:  Unable to assess  Attitude / Demeanor: Cooperative  Interested Attentive  Speech      Rate / Production: Normal/ Responsive      Volume:  Normal  volume      Language:  intact  Mood:   Sad   Affect:   Flat    Thought Content: recent SI, denies current  Thought Process: Goal Directed  Logical       Associations: No loosening of associations  Insight:   Fair    Judgment:  Intact   Orientation:  All  Attention/concentration: Good      Substance Use:  Patient did not report a family history of substance use concerns; see medical history section for details.  Patient has not received chemical dependency treatment in the past.  Patient has not ever been to detox.      Patient is not currently receiving any chemical dependency treatment. Patient reported the following problems as a result of their substance use:  none.    Patient reports she drinks alcohol occasionally on a weekend with friends.  Denies abuse of alcohol.    Patient denies using tobacco.  Patient denies using cannabis.  Patient reports using/abusing the following substance(s). Patient reported no other substance use.     Substance Use: No symptoms    Based on the negative CAGE score and clinical interview there  are not indications of drug or alcohol abuse.      Significant Losses / Trauma / Abuse / Neglect Issues:   Patient did not  serve in the .  There are indications or report of significant loss, trauma, abuse or neglect issues related to: major medical problems treated for cancer this year and a recent relationship loss.  Concerns for possible neglect are not present.     Safety Assessment:   Patient denies current homicidal ideation and behaviors.  Patient denies current self-injurious ideation and behaviors.    Patient denied risk behaviors associated with substance use.  Patient denies any high risk behaviors associated with mental health symptoms.  Patient reports the following current concerns for their personal safety: None.  Patient reports there are not firearms in the house.     History of Safety Concerns:  Patient denied a history of homicidal ideation.     Patient denied a history of personal safety concerns.    Patient denied a history of assaultive behaviors.    Patient denied a history of sexual assault behaviors.     Patient denied a history of risk behaviors associated with  substance use.  Patient denies any history of high risk behaviors associated with mental health symptoms.  Patient reports the following protective factors: daily obligations; other    Risk Plan:  See Recommendations for Safety and Risk Management Plan    Review of Symptoms per patient report:   Depression: Change in sleep, Lack of interest, Excessive or inappropriate guilt, Change in energy level, Difficulties concentrating, Change in appetite, Suicidal ideation, Low self-worth, Feeling sad, down, or depressed and Withdrawn  Olga:  No Symptoms  Psychosis: No Symptoms  Anxiety: Excessive worry, Nervousness, Physical complaints, such as headaches, stomachaches, muscle tension, Sleep disturbance, Ruminations, Poor concentration and Irritability  Panic:  No symptoms  Post Traumatic Stress Disorder:  Experienced traumatic event medical trauma   Eating Disorder: No Symptoms  ADD / ADHD:  No symptoms  Conduct Disorder: No symptoms  Autism Spectrum Disorder: No symptoms  Obsessive Compulsive Disorder: No Symptoms      Diagnostic Criteria:   Unspecified Anxiety Disorder  Mixed anxiety-depressive disorder: clinically significant symptoms of anxiety and depression, but the criteria are not met for either a specific Mood Disorder or a specific Anxiety Disorder.  Clinically significant social phobic symptoms that are related to the social impact of having a general medical condition or mental disorder  The client does not report enough symptoms for the full criteria of any specific Anxiety Disorder to have been met  Anxiety disorder is present, but at this time therapist is unable to determine whether it is primary.  Further assessment needed.  Client reports the following symptoms of anxiety:   - Excessive anxiety and worry about a number of events or activities (such as work or school performance).    - The person finds it difficult to control the worry.   - Restlessness or feeling keyed up or on edge.    - Being easily  fatigued.    - Difficulty concentrating or mind going blank.    - Irritability.    - Muscle tension.    - Sleep disturbance (difficulty falling or staying asleep, or restless unsatisfying sleep).    - The focus of the anxiety and worry is not confined to features of an Axis I disorder.   - The anxiety, worry, or physical symptoms cause clinically significant distress or impairment in social, occupational, or other important areas of functioning.    - The disturbance is not due to the direct physiological effects of a substance (e.g., a drug of abuse, a medication) or a general medical condition (e.g., hyperthyroidism) and does not occur exclusively during a Mood Disorder, a Psychotic Disorder, or a Pervasive Developmental Disorder.     Functional Status:  Patient reports the following functional impairments:  academic performance, self-care and social interactions.     Whodas 28    Clinical Summary:    1. Reason for assessment: pt was scheduled to see [facility] today but provider out due to illness.  Pt needed a DA so this writer filled in.  Pt is seeking bridging care until she can establish care with a therapist she finds compatible.  Mother is seeking therapists for pt at this time.  [facility] through [facility].  2. Psychosocial, Cultural and Contextual Factors: a recent break up, facing graduation and transition to working world, recent cancer dx with ongoing care needed.  Pt reports family will be changing insurance plans at the first of the year.  3. Principal DSM5 Diagnoses  (Sustained by DSM5 Criteria Listed Above):   300.00 (F41.9) Unspecified Anxiety Disorder.  4. Other Diagnoses that is relevant to services:  none  5. Provisional Diagnosis:MDD   6. Prognosis: Expect Improvement.  7. Likely consequences of symptoms if not treated: will likely need acute care or further ED visits.  8. Client strengths include:  educated, goal-focused, insightful, support of family, friends and providers and  "willing to ask questions .     Recommendations:     1. Plan for Safety and Risk Management:    Moderate Risk is identified when the patient has one of the following:  Suicidal Ideation with method (The How) Without plan, intent, or behavior in the past month (Yes to C-SSRS Ideation #3)    The following has been recommended:  Complete/Review/Update Safety Plan    Safety Plan:  Adult Short Safety Plan:   Name: Na De León  YOB: 2000  Date: December 12, 2022   My primary care provider: Frances Don  Other care team support:  [facility] will bridge care for you until a therapist can be found.   My Triggers:  loss of relationship of one year, \"didn't want to be here\"     Additional People, Places, and Things that I can access for support: friends from school are coming home from break.                  GREEN    Good Control  1. I feel good  2. No suicidal thoughts   3. Can work, sleep and play      Action Steps  1. Self-care: balanced meals, exercising, sleep practices, etc.  2. Take your medications as prescribed.  3. Continue meetings with therapist and prescriber.  4.  Do the healthy things that I enjoy.             YELLOW  Getting Worse  I have ANY of these:  1. I do not feel good  2. Difficulty Concentrating  3. Sleep is changing  4. Increase/Change in my thoughts to hurt self and/or others, but I can still manage and not act on it.   5. Not taking care of self.               Action Steps (in addition to the above):  1. Inform your therapist and psychiatric prescriber/PCP.  2. Keep taking your medications as prescribed.    3. Turn to people you can ask for help.  4. Use internal coping strategies -see below.  5. Create safe environment: lock and limit medications             RED  Get Help  If I have ANY of these:  1. Current and uncontrollable thoughts and/or behaviors to hurt self and/or others.      Actions to manage my safety  1. Contact your emergency person Magige De León " (Mother)   [phone]   2. Call or Text [phone]  3. Call my crisis team- [facility] [phone]  3. Or Call 911 or go to the emergency room right away          My Internal Coping Strategies include the following:  Currently, I stay in my room and in bed.        Safety Concerns  How To Identify Situations That Make Your Mental Health Worse:  Triggers are things that make your mental health worse.  Look at the list below to help you find your triggers and what you can do about them.     1. Identify Early Warning Signs:    Sometimes symptoms return, even when people do their best to stay well. Symptoms can develop over a short period of time with little or no warning, but most of the time they emerge gradually over several weeks.  Early warning signs are changes that people experience when a relapse is starting. Some early warning signs are common and others are not as common.   Common Early Warning Signs:    Feeling tense or nervous, Eating less or eating more, Trouble sleeping -either too much or too little sleep, Feeling depressed or low, Feeling irritable, Feeling like not being around other people, Trouble concentrating and Urges to harm self     2. Identify action steps to take when warning signs are noticed:    Taking Action- It is important to take action if you are experiencing early warning signs of a relapse.  The faster you act, the more likely it is that you can avoid a full relapse.  It is helpful to identify several specific ways to cope with symptoms.      The following is my list of symptoms and coping strategies that I can use when they are present:    Symptom Coping Strategies   Anxiety -Talk with someone in your support system and let him or her know how you are feeling.  -Use relaxation techniques such as deep breathing or imagery.  -Use positive affirmations to counteract negative self-talk such as  I am learning to let go of worry.    Depression - Schedule your day; include activities you  have to do and activities you enjoy doing.  - Get some exercise - walk, run, bike, or swim.  - Give yourself credit for even the smallest things you get done.   Sleep Difficulties   - Go to sleep at the same time every day.  - Do something relaxing before bed, such as drinking herbal tea or listening to music.  - Avoid having discussions about upsetting topics before going to bed.   Concentration Difficulties - Minimize distractions so there is only one thing for you to focus on at a time.    - Ask the person you are having a conversation with to slow down or repeat things you are unsure of.           .  Patient consented to co-developed safety plan.  Safety and risk management plan was completed.  Patient agreed to use safety plan should any safety concerns arise.  A copy was given to the patient..          Report to child / adult protection services was NA.     2. Patient's identified no cultural needs identifed.     3. Initial Treatment will focus on:    Depression, loss     4. Resources/Service Plan:    services are not indicated.   Modifications to assist communication are not indicated.   Additional disability accommodations are not indicated.      5. Collaboration:   Collaboration / coordination of treatment will be initiated with the following  support professionals: Transition [facility].      6.  Referrals:   The following referral(s) will be initiated: Transitional Care. Next Scheduled Appointment: TBD.      A Release of Information has been obtained for the following: no miryam at the time of this assessment.     Emergency Contact Maggie De León (Mother)   [phone]  was obtained.      Clinical Substantiation/medical necessity for the above recommendations:  Pt is a 22 year old female who presents after three recent ED visits due to SI.  She has tried medication changes.  She reports significant stressors of cancer diagnosis this year, and more recently, a break up of a relationship that was one year  duration and meaningful for the patient.  She is a senior in college and had to come home to try to finish her semester at her parents home due to SI, increased depression.  She could not locate services in [facility].  She reports a long hx of anxiety that has not been effectively treated.  Pt reports some family dynamics that trouble her but overall she feels supported by her family.  Pt plans to return to school after winter break 1/9/23.  She reports her appetite is down since the break up.  She reports a strong friend support group.  Pt recently met with a therapist but did not find this to be a good match  She has a psychiatrist appointment scheduled by her mother who is also seeking a new therapy appointment for pt.  PHP was described but there appeared to be no interest and IOP would interfere with ongoing schooling.  Pt is a senior and her last semester starts in January.  No SI today, pt reports she had had a plan to overdose in the recent past on her headache medications which mother has locked up.  Pt has no hx of actual attempts.  She reported feeling safe today and plans to work with Transitional care until a therapist is located for her.  No HI, no command hallucinations. PHQ9 has come down with less SI     7. TEGAN:    TEGAN:  Discussed the general effects of drugs and alcohol on health and well-being.   8. Records:   These were reviewed at time of assessment.   Information in this assessment was obtained from the medical record and provided by patient who is a good historian.    Patient will have open access to their mental health medical record.    9.   Interactive Complexity: No      Provider Name/ Credentials:  Kelly Moore MA LP    December 12, 2022

## 2022-12-12 NOTE — PATIENT INSTRUCTIONS
Start escitalopram for depression/anxiety    Find a therapist    Ok to try CBD oil gummies    Follow up with primary doctor in 1 month

## 2022-12-12 NOTE — TELEPHONE ENCOUNTER
Contacted patient to schedule return visit, left voicemail asking for call back to TC.    Erum Abdalla  Transition Clinic Coordinator  Date and Time: 12/12/22 1:06 PM

## 2022-12-12 NOTE — TELEPHONE ENCOUNTER
Patient called back on TC queue and scheduled a visit with TC therapist Rossy 12/19/2022.    Attempt to add insurance to  account, but Epic would not allow. Confirmed with patient and patient mother that the insurance listed under PF is correct and should be listed for  appointments, note mare in registration tab.     Patient stated would like primary contact number to be changed to her cell from mothers. Coordinator updated.    Erum Abdalla  Transition Clinic Coordinator  Date and Time: 12/12/22 2:59 PM

## 2022-12-12 NOTE — TELEPHONE ENCOUNTER
Coordinator reached out to patient to reschedule as clinician is out unexpectedly. Voicemail left for patient stating need to cancel appointment today with TC therapist and requested call back to reschedule. Offered additional times available today.    [employer] message also sent to patient.    Erum Abdalla  Transition Clinic Coordinator  Date and Time: 12/12/22 7:57 AM

## 2022-12-12 NOTE — PROGRESS NOTES
Assessment & Plan     Suicidal ideation    - escitalopram (LEXAPRO) 10 MG tablet  Dispense: 90 tablet; Refill: 3    Severe major depression (H)    - Adult Mental Health [facility] Referral  - escitalopram (LEXAPRO) 10 MG tablet  Dispense: 90 tablet; Refill: 3  - Vitamin D Deficiency  - TSH  - Vitamin D Deficiency  - TSH    Anxiety    - escitalopram (LEXAPRO) 10 MG tablet  Dispense: 90 tablet; Refill: 3  - Vitamin D Deficiency  - TSH  - Vitamin D Deficiency  - TSH    Anemia, unspecified type    - CBC with platelets  - CBC with platelets    We discussed medication options for treatment of anxiety and depression.  We will start escitalopram.  Prescription provided and she was counseled on use of medication and side effects.  Discussed that sometimes depression symptoms can worsen the first couple of weeks that she is on this medication so she should monitor her symptoms closely.  Recommend close follow-up with PCP and she does have an appointment scheduled on 12/28/2022.  She can continue diazepam on an as-needed basis for anxiety.  She was prescribed this in the emergency department.  We will check a TSH and vitamin D level today.  We will also check hemogram as she did have anemia postoperatively and hemoglobin has not been rechecked.  Referral placed to psychiatry.  She we will continue to work to find a therapist that she can schedule an appointment within the near future.  Influenza vaccine administered today.           MED REC REQUIRED  Post Medication Reconciliation Status: discharge medications reconciled and changed, per note/orders  Depression Screening Follow Up    PHQ 12/12/2022   PHQ-9 Total Score 20   Q9: Thoughts of better off dead/self-harm past 2 weeks Several days   F/U: Thoughts of suicide or self-harm Yes   F/U: Self harm-plan Yes   F/U: Self-harm action No   F/U: Safety concerns Yes                 Follow Up      Follow Up Actions Taken  Crisis resource information provided in the After Visit  Summary  Mental Health Referral placed    Discussed the following ways the patient can remain in a safe environment:  be around others      No follow-ups on file.    Delmy Arana MD  [facility] SARAH Monzon is a 22 year old, presenting for the following health issues:  Hospital F/U and Imm/Inj (Flu/)    She comes in today for emergency department follow-up visit.  She has been seen in the emergency department on 3 occasions over the past couple weeks for suicidal ideation.  She states that she really has had a difficult year.  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  She is in her senior year of college and trying to make plans for post graduation.  This is been quite stressful.  She also went through a break-up.  Initially was seen in an emergency department up in [place] where she attends school.  There were no mental health resources available for her up there so she was advised to come down to the [facility] and be seen.  She was seen in the emergency department last week and was given referrals.  She was also prescribed hydroxyzine.  Hydroxyzine was not helpful. She was not able to get in to any mental health specialists in a timely manner so she returned to the emergency department over the weekend.  She was prescribed diazepam for anxiety.  She is hoping to get started on medication today for treatment of anxiety and depression.  She did have a transitional therapy appointment earlier today and will continue that.  In the meantime her mother is helping her find another therapist that she can see in the near future.  [facility] therapists are currently booked out several months.  She reports that the diazepam has been helpful.  She has been taking that every 6 hours.  We reviewed her current medications.  In the past she has been prescribed bupropion.  She reports that this was prescribed for anxiety but it did not help so she  stopped taking the medication.  There is family history of anxiety and depression in her family.  Her mother takes medication for anxiety.  She has a couple sisters were also treated for anxiety and depression.  Review of systems is otherwise negative.  No other concerns or questions.  Imm/Inj               Review of Systems         Objective    BP 90/58 (BP Location: Left arm, Cuff Size: Adult Regular)   Pulse 100   Temp 99.1  F (37.3  C) (Temporal)   Wt 54.7 kg (120 lb 9.6 oz)   LMP 11/15/2022 (Approximate)   SpO2 98%   BMI 21.36 kg/m    Body mass index is 21.36 kg/m .  Physical Exam   GENERAL: healthy, alert and no distress  PSYCH: mentation appears normal and affect flat                    Answers for HPI/ROS submitted by the patient on 12/12/2022  If you checked off any problems, how difficult have these problems made it for you to do your work, take care of things at home, or get along with other people?: Very difficult  PHQ9 TOTAL SCORE: 24

## 2022-12-13 LAB — DEPRECATED CALCIDIOL+CALCIFEROL SERPL-MC: 18 UG/L (ref 20–75)

## 2022-12-13 NOTE — PATIENT INSTRUCTIONS
"Howie Monzon, it was a pleasure to meet you today!  I am attaching the safety plan we discussed and also want to tell you that a coordinator will reach out to schedule you with the Transition Clinic for follow up sessions.  Remember that you can call the intake department at [phone] to reschedule with me if you still need help finding providers.  I know you said your insurance is likely to change after the first of the year so we would want to take that into consideration.      Take Care!  Kelly      Safety Plan:   Name: Na De León  YOB: 2000  Date: December 12, 2022   My primary care provider: Frances Don  Other care team support:  Transition Clinic will bridge care for you until a therapist can be found.   My Triggers:  loss of relationship of one year, \"didn't want to be here\"     Additional People, Places, and Things that I can access for support: friends from school are coming home from break.                  GREEN    Good Control  1. I feel good  2. No suicidal thoughts   3. Can work, sleep and play      Action Steps  1. Self-care: balanced meals, exercising, sleep practices, etc.  2. Take your medications as prescribed.  3. Continue meetings with therapist and prescriber.  4.  Do the healthy things that I enjoy.             YELLOW  Getting Worse  I have ANY of these:  1. I do not feel good  2. Difficulty Concentrating  3. Sleep is changing  4. Increase/Change in my thoughts to hurt self and/or others, but I can still manage and not act on it.   5. Not taking care of self.               Action Steps (in addition to the above):  1. Inform your therapist and psychiatric prescriber/PCP.  2. Keep taking your medications as prescribed.    3. Turn to people you can ask for help.  4. Use internal coping strategies -see below.  5. Create safe environment: lock and limit medications             RED  Get Help  If I have ANY of these:  1. Current and uncontrollable thoughts and/or " behaviors to hurt self and/or others.      Actions to manage my safety  1. Contact your emergency person Maggie De León (Mother)   [phone]   2. Call or Text [phone]  3. Call my crisis team- [facility] [phone]  3. Or Call [phone] or go to the emergency room right away          My Internal Coping Strategies include the following:  Currently, I stay in my room and in bed.        Safety Concerns  How To Identify Situations That Make Your Mental Health Worse:  Triggers are things that make your mental health worse.  Look at the list below to help you find your triggers and what you can do about them.     1. Identify Early Warning Signs:    Sometimes symptoms return, even when people do their best to stay well. Symptoms can develop over a short period of time with little or no warning, but most of the time they emerge gradually over several weeks.  Early warning signs are changes that people experience when a relapse is starting. Some early warning signs are common and others are not as common.   Common Early Warning Signs:    Feeling tense or nervous, Eating less or eating more, Trouble sleeping -either too much or too little sleep, Feeling depressed or low, Feeling irritable, Feeling like not being around other people, Trouble concentrating and Urges to harm self     2. Identify action steps to take when warning signs are noticed:    Taking Action- It is important to take action if you are experiencing early warning signs of a relapse.  The faster you act, the more likely it is that you can avoid a full relapse.  It is helpful to identify several specific ways to cope with symptoms.      The following is my list of symptoms and coping strategies that I can use when they are present:    Symptom Coping Strategies   Anxiety -Talk with someone in your support system and let him or her know how you are feeling.  -Use relaxation techniques such as deep breathing or imagery.  -Use positive affirmations to counteract  negative self-talk such as  I am learning to let go of worry.    Depression - Schedule your day; include activities you have to do and activities you enjoy doing.  - Get some exercise - walk, run, bike, or swim.  - Give yourself credit for even the smallest things you get done.   Sleep Difficulties   - Go to sleep at the same time every day.  - Do something relaxing before bed, such as drinking herbal tea or listening to music.  - Avoid having discussions about upsetting topics before going to bed.   Concentration Difficulties - Minimize distractions so there is only one thing for you to focus on at a time.    - Ask the person you are having a conversation with to slow down or repeat things you are unsure of.

## 2022-12-16 ENCOUNTER — HOSPITAL ENCOUNTER (OUTPATIENT)
Dept: PHYSICAL THERAPY | Facility: REHABILITATION | Age: 22
Discharge: HOME | End: 2022-12-16
Payer: COMMERCIAL

## 2022-12-16 DIAGNOSIS — M79.18 MYOFASCIAL PAIN: ICD-10-CM

## 2022-12-16 DIAGNOSIS — R20.0 NUMBNESS OF RIGHT ANTERIOR THIGH: Primary | ICD-10-CM

## 2022-12-16 PROCEDURE — 97110 THERAPEUTIC EXERCISES: CPT | Mod: GP | Performed by: PHYSICAL THERAPIST

## 2022-12-16 PROCEDURE — 97140 MANUAL THERAPY 1/> REGIONS: CPT | Mod: GP | Performed by: PHYSICAL THERAPIST

## 2022-12-19 ENCOUNTER — VIRTUAL VISIT (OUTPATIENT)
Dept: BEHAVIORAL HEALTH | Facility: CLINIC | Age: 22
End: 2022-12-19
Payer: COMMERCIAL

## 2022-12-19 DIAGNOSIS — F43.23 ADJUSTMENT DISORDER WITH MIXED ANXIETY AND DEPRESSED MOOD: Primary | ICD-10-CM

## 2022-12-19 ASSESSMENT — COLUMBIA-SUICIDE SEVERITY RATING SCALE - C-SSRS
5. HAVE YOU STARTED TO WORK OUT OR WORKED OUT THE DETAILS OF HOW TO KILL YOURSELF? DO YOU INTEND TO CARRY OUT THIS PLAN?: NO
6. HAVE YOU EVER DONE ANYTHING, STARTED TO DO ANYTHING, OR PREPARED TO DO ANYTHING TO END YOUR LIFE?: NO
2. HAVE YOU ACTUALLY HAD ANY THOUGHTS OF KILLING YOURSELF IN THE PAST MONTH?: YES
1. IN THE PAST MONTH, HAVE YOU WISHED YOU WERE DEAD OR WISHED YOU COULD GO TO SLEEP AND NOT WAKE UP?: YES
3. HAVE YOU BEEN THINKING ABOUT HOW YOU MIGHT KILL YOURSELF?: YES
4. HAVE YOU HAD THESE THOUGHTS AND HAD SOME INTENTION OF ACTING ON THEM?: NO

## 2022-12-19 NOTE — PROGRESS NOTES
"                     Discharge Summary  Single Session    Client Name: Na De León MRN#: [id number] YOB: 2000    Discharge Date:   2022    Service Modality: Video Visit:      Provider verified identity through the following two step process.  Patient provided:  Patient  and Patient address    Telemedicine Visit: The patient's condition can be safely assessed and treated via synchronous audio and visual telemedicine encounter.      Reason for Telemedicine Visit: Patient has requested telehealth visit    Originating Site (Patient Location): Patient's home    Distant Site (Provider Location): [facility]    Consent:  The patient/guardian has verbally consented to: the potential risks and benefits of telemedicine (video visit) versus in person care; bill my insurance or make self-payment for services provided; and responsibility for payment of non-covered services.     Patient would like the video invitation sent by:  My Chart    Mode of Communication:  Video Conference via [employer] [place]    Distant Location (Provider):  On-site    As the provider I attest to compliance with applicable laws and regulations related to telemedicine.    Service Type: Individual      Session Start Time: 1100  Session End Time: 114      Session Length: 44 Minutes     Session #: 1     Attendees: Client attended alone      Focus of Treatment Objective(s):  Client's presenting concerns included: Depressed Mood - low mood; sad; crying; suicidal ideation; sleep difficulties  Adjustment Difficulties related to: loss of signigicant relationship and loss of \"normal college experience\"; cancer diagnosis  Grief / Loss - loss of recent relationship; cancer dx this past year  Stage of Change at time of Discharge: ACTION (Actively working towards change)    Medication Adherence:  Yes    Chemical Use:  NA    Assessment: Current Emotional / Mental Status (status of significant " symptoms):    Risk status (Self / Other harm or suicidal ideation)  Client denies current fears or concerns for personal safety.  Client reports the following current or recent suicidal ideation or behaviors: recent SI but nothing current.  Client denies current or recent homicidal ideation or behaviors.  Client denies current or recent self injurious behavior or ideation.  Client denies other safety concerns.  A safety and risk management plan has been developed including see Crisis Assessment completed on 12/11/2022.    Appearance:   Appropriate   Eye Contact:   Good   Psychomotor Behavior: Normal   Attitude:   Cooperative   Orientation:   All  Speech   Rate / Production: Emotional   Volume:  Normal   Mood:    Anxious  Depressed  Sad  Grieving  Affect:    Tearful Worrisome   Thought Content:  Clear   Thought Form:  Coherent  Logical   Insight:   Good     DSM5 Diagnoses: (Sustained by DSM5 Criteria Listed Above)  Diagnoses: Adjustment Disorders  309.28 (F43.23) With mixed anxiety and depressed mood  Psychosocial & Contextual Factors: 22 y.o.  female; Senior at [employer]; recent loss of relationship; cancer dx earlier in the year. Supportive family.   WHODAS 2.0 (12 item) Score: n/a    Reason for Discharge:  pt scheduled with long-term therapist on 12/20/22 at [facility]      Aftercare Plan:  Client will participate in individual therapy      MELINA Colon  December 19, 2022

## 2022-12-19 NOTE — PROGRESS NOTES
[facility]                                    Progress Note    Patient Name: Na De León  Date: 2022           Service Type: Individual      Session Start Time: 1100  Session End Time: 1144     Session Length: 44 Minutes    Session #: 1    Attendees: Client attended alone    Service Modality:  Video Visit:      Provider verified identity through the following two step process.  Patient provided:  Patient  and Patient address    Telemedicine Visit: The patient's condition can be safely assessed and treated via synchronous audio and visual telemedicine encounter.      Reason for Telemedicine Visit: Patient has requested telehealth visit    Originating Site (Patient Location): Patient's home    Distant Site (Provider Location): [facility] [place]    Consent:  The patient/guardian has verbally consented to: the potential risks and benefits of telemedicine (video visit) versus in person care; bill my insurance or make self-payment for services provided; and responsibility for payment of non-covered services.     Patient would like the video invitation sent by:  My Chart    Mode of Communication:  Video Conference via Amwell    Distant Location (Provider):  On-site    As the provider I attest to compliance with applicable laws and regulations related to telemedicine.    DATA  Interactive Complexity: No  Crisis: No        Progress Since Last Session (Related to Symptoms / Goals / Homework):   Symptoms: No change no significant change since ED visit; continues to endorse sadness, low mood, excessive worry, sleep difficulties    Homework: n/a      Episode of Care Goals: Minimal progress - ACTION (Actively working towards change); Intervened by reinforcing change plan / affirming steps taken     Current / Ongoing Stressors and Concerns:   Pt referred to the Transition Clinic on 2022 after presenting to the ED for increased  "depression, anxiety, and SI. Per crisis assessment, written on 12/11/2022, \"Patient has been feeling more down and depressed for the last several weeks; she had two previous ED visits, both on 12/7 which recommended outpatient follow-up. She presented again last night with her mom stating 'I still felt super anxious and just not have the best thoughts again.' She reports that she met with a therapist on Friday but did not feel comfortable or connected. She has tried the Hydroxyzine that was prescribed at previous visits but is not finding if particularly helpful. She has not felt any relief. Cannot identify any specific trigger but reports that overall this year has been really stressful for her since she was diagnosed with cancer, had a stressful school year and a recent break-up. Reports thoughts of suicide but has no specific plan today. Reports recent thoughts of taking pills; mom reports that all medications in their house have been locked up. No hx suicide attempts. During assessment, pt appears irritable with mom and writer, she expresses frustration that nothing is helping. Did encourage her to attend appointments this coming week with therapy and medication providers.      Brief Psychosocial History  Patient is a senior in college at [employer]. 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 When not in school she lives with her parents in [place]. She has 4 sisters all either out of the house or in college.      Significant Clinical History  Pt diagnosed with cancer earlier this year which has been contributing to their mental health. The patient notes they were on wellbutrin about a year ago, but stopped taking it because they felt it decreased their productivity. The patient denies prior mental health hospitalizations or suicide attempts. The patient tried to seek help up near college but found it difficult so returned here with family.\"    Patient scheduled for long-term " therapy appointment at [facility] on 12/21/2022.     Pt expressed comprehension and acceptance of informed consent and the nature of / limitations to confidentiality due to mandated reporting when reviewed in session.       Treatment Objective(s) Addressed in This Session:   use distraction each time intrusive worry surfaces  Increase interest, engagement, and pleasure in doing things  Decrease frequency and intensity of feeling down, depressed, hopeless  Improve quantity and quality of night time sleep / decrease daytime naps  education on grief and loss regarding her relationship; her past college experience, and her cancer diagnosis       Intervention:   CBT: reviewed CBT coping skills and modeled use  Motivational Interviewing: engaged patient using active listening, validation, and reassurance  provided psychoeducation on grief process     Assessments completed prior to visit:  The following assessments were completed by patient for this visit:  [place] Suicide Severity Rating Scale (Short Version)  [place] Suicide Severity Rating (Short Version) 4/12/2022 12/7/2022 12/10/2022 12/10/2022 12/11/2022 12/12/2022 12/19/2022   Over the past 2 weeks have you felt down, depressed, or hopeless? no yes yes - - - -   Over the past 2 weeks have you had thoughts of killing yourself? no yes yes - - - -   Have you ever attempted to kill yourself? no no no - - - -   Q1 Wished to be Dead (Past Month) - yes yes yes - yes yes   Q2 Suicidal Thoughts (Past Month) - yes yes yes - yes yes   Q3 Suicidal Thought Method - yes yes no - yes yes   Q4 Suicidal Intent without Specific Plan - no no no - no no   Q5 Suicide Intent with Specific Plan - yes yes no - yes no   Q6 Suicide Behavior (Lifetime) - yes yes no - no no   Within the Past 3 Months? - no yes no - yes no   Level of Risk per Screen - high risk high risk low risk - high risk moderate risk   High Risk Required Interventions - On continuous in person observation - Provider  notified;On continuous in person observation - - -   Required Interventions - Provider notified - Room searched;Patient searched;Room made safe;Belongings removed - - -   Interventions - - - DEC consulted - - -   1. Wish to be Dead (Since Last Contact) - - - - 1 - -   2. Non-Specific Active Suicidal Thoughts (Since Last Contact) - - - - 1 - -   3. Active Suicidal Ideation with any Methods (Not Plan) Without Intent to Act (Since Last Contact) - - - - 0 - -   5. Active Suicidal Ideation with Specific Plan and Intent (Since Last Contact) - - - - 0 - -   Most Severe Ideation Rating (Since Last Contact) - - - - 3 - -   Frequency (Since Last Contact) - - - - 4 - -   Deterrents (Since Last Contact) - - - - 2 - -   Reasons for Ideation (Since Last Contact) - - - - 4 - -   Actual Attempt (Since Last Contact) - - - - 0 - -   Has subject engaged in non-suicidal self-injurious behavior? (Since Last Contact) - - - - 0 - -   Interrupted Attempts (Since Last Contact) - - - - 0 - -   Aborted or Self-Interrupted Attempt (Since Last Contact) - - - - 0 - -   Preparatory Acts or Behavior (Since Last Contact) - - - - 0 - -   Calculated C-SSRS Risk Score (Since Last Contact) - - - - Low Risk - -       [place] Suicide Severity Rating Scale Full Clinical Version: 12/7/22     [place] Suicide Severity Rating Scale Since Last Contact: 12/11/22  Suicidal Ideation (Since Last Contact)  1. Wish to be Dead (Since Last Contact): Yes  2. Non-Specific Active Suicidal Thoughts (Since Last Contact): Yes  3. Active Suicidal Ideation with any Methods (Not Plan) Without Intent to Act (Since Last Contact): No  5. Active Suicidal Ideation with Specific Plan and Intent (Since Last Contact): No  Suicidal Behavior (Since Last Contact)  Actual Attempt (Since Last Contact): No  Has subject engaged in non-suicidal self-injurious behavior? (Since Last Contact): No  Interrupted Attempts (Since Last Contact): No  Aborted or Self-Interrupted Attempt (Since Last  Contact): No  Preparatory Acts or Behavior (Since Last Contact): No     C-SSRS Risk (Since Last Contact)  Calculated C-SSRS Risk Score (Since Last Contact): Low Risk     Validity of evaluation is not impacted by presenting factors during interview.   Comments regarding subjective versus objective responses to Drew tool: N/A  Environmental or Psychosocial Events: new diagnosis of major illness and recent life events: break-up  Chronic Risk Factors: parental mental health issue   Warning Signs: hopelessness and anxiety, agitation, unable to sleep, sleeping all the time  Protective Factors: strong bond to family unit, community support, or employment, responsibilities and duties to others, including pets and children, lives in a responsibly safe and stable environment, help seeking and reality testing ability  Interpretation of Risk Scoring, Risk Mitigation Interventions and Safety Plan:  Pt appears low risk from both [place] and direct observations/assessment.      Does the patient have thoughts of harming others? No  Is the patient engaging in sexually inappropriate behavior?  no         ASSESSMENT: Current Emotional / Mental Status (status of significant symptoms):   Risk status (Self / Other harm or suicidal ideation)   Patient denies current fears or concerns for personal safety.   Patient reports the following current or recent suicidal ideation or behaviors: past SI within last two weeks but no intent or plan.   Patient denies current or recent homicidal ideation or behaviors.   Patient denies current or recent self injurious behavior or ideation.   Patient denies other safety concerns.   Patient reports there has been no change in risk factors since their last session.     Patient reports there has been no change in protective factors since their last session.     Recommended that patient call 911 or go to the local ED should there be a change in any of these risk factors.     Appearance:   Appropriate     Eye Contact:   Good    Psychomotor Behavior: Normal    Attitude:   Cooperative  Friendly   Orientation:   All   Speech    Rate / Production: Emotional    Volume:  Normal    Mood:    Sad  Grieving   Affect:    Tearful Worrisome    Thought Content:  Clear    Thought Form:  Coherent  Logical    Insight:    Good      Medication Review:   No changes to current psychiatric medication(s)     Medication Compliance:   Yes     Changes in Health Issues:   None reported     Chemical Use Review:   Substance Use: Chemical use reviewed, no active concerns identified      Tobacco Use: No current tobacco use.      Diagnosis:  1. Adjustment disorder with mixed anxiety and depressed mood F43.23       Collateral Reports Completed:   Not Applicable    PLAN: (Patient Tasks / Therapist Tasks / Other)  Patient will reach out to her mother and sisters for support if needed  Patient will attend her long-term therapy appointment that is scheduled for 12/20/2022  Pt will remain medication compliant and report any side effects to her prescriber        Rossy Dempsey [facility], December 19, 2022                                                       ______________________________________________________________________

## 2022-12-27 ENCOUNTER — HOSPITAL ENCOUNTER (OUTPATIENT)
Dept: PHYSICAL THERAPY | Facility: REHABILITATION | Age: 22
Discharge: HOME | End: 2022-12-27
Payer: COMMERCIAL

## 2022-12-27 DIAGNOSIS — M79.18 MYOFASCIAL PAIN: ICD-10-CM

## 2022-12-27 DIAGNOSIS — R20.0 NUMBNESS OF RIGHT ANTERIOR THIGH: Primary | ICD-10-CM

## 2022-12-27 PROCEDURE — 97140 MANUAL THERAPY 1/> REGIONS: CPT | Mod: GP | Performed by: PHYSICAL THERAPIST

## 2022-12-27 PROCEDURE — 97110 THERAPEUTIC EXERCISES: CPT | Mod: GP | Performed by: PHYSICAL THERAPIST

## 2022-12-28 ENCOUNTER — NURSE TRIAGE (OUTPATIENT)
Dept: NURSING | Facility: CLINIC | Age: 22
End: 2022-12-28

## 2022-12-28 ENCOUNTER — HOSPITAL ENCOUNTER (EMERGENCY)
Facility: CLINIC | Age: 22
Discharge: HOME | End: 2022-12-28
Attending: FAMILY MEDICINE | Admitting: FAMILY MEDICINE
Payer: COMMERCIAL

## 2022-12-28 ENCOUNTER — OFFICE VISIT (OUTPATIENT)
Dept: FAMILY MEDICINE | Facility: CLINIC | Age: 22
End: 2022-12-28
Payer: COMMERCIAL

## 2022-12-28 VITALS
SYSTOLIC BLOOD PRESSURE: 96 MMHG | RESPIRATION RATE: 16 BRPM | DIASTOLIC BLOOD PRESSURE: 68 MMHG | HEART RATE: 78 BPM | BODY MASS INDEX: 21.26 KG/M2 | HEIGHT: 63 IN | WEIGHT: 120 LBS | TEMPERATURE: 98.2 F | OXYGEN SATURATION: 99 %

## 2022-12-28 VITALS
RESPIRATION RATE: 14 BRPM | WEIGHT: 120 LBS | DIASTOLIC BLOOD PRESSURE: 60 MMHG | HEART RATE: 88 BPM | OXYGEN SATURATION: 94 % | SYSTOLIC BLOOD PRESSURE: 100 MMHG | BODY MASS INDEX: 21.26 KG/M2

## 2022-12-28 DIAGNOSIS — S44.02XA INJURY OF LEFT ULNAR NERVE AT UPPER ARM LEVEL, INITIAL ENCOUNTER: ICD-10-CM

## 2022-12-28 DIAGNOSIS — Z30.46 SURVEILLANCE OF PREVIOUSLY PRESCRIBED IMPLANTABLE SUBDERMAL CONTRACEPTIVE: Primary | ICD-10-CM

## 2022-12-28 PROBLEM — Z97.5 NEXPLANON IN PLACE: Status: ACTIVE | Noted: 2022-12-28

## 2022-12-28 PROCEDURE — 99207 PR DROP WITH A PROCEDURE: CPT | Performed by: FAMILY MEDICINE

## 2022-12-28 PROCEDURE — 11983 REMOVE/INSERT DRUG IMPLANT: CPT | Performed by: FAMILY MEDICINE

## 2022-12-28 PROCEDURE — 99283 EMERGENCY DEPT VISIT LOW MDM: CPT | Mod: 25

## 2022-12-28 RX ORDER — HYDROXYZINE PAMOATE 50 MG/1
CAPSULE ORAL
COMMUNITY
Start: 2022-12-20

## 2022-12-28 RX ORDER — ESCITALOPRAM OXALATE 20 MG/1
20 TABLET ORAL DAILY
COMMUNITY
Start: 2022-12-19

## 2022-12-28 ASSESSMENT — PATIENT HEALTH QUESTIONNAIRE - PHQ9
SUM OF ALL RESPONSES TO PHQ QUESTIONS 1-9: 3
10. IF YOU CHECKED OFF ANY PROBLEMS, HOW DIFFICULT HAVE THESE PROBLEMS MADE IT FOR YOU TO DO YOUR WORK, TAKE CARE OF THINGS AT HOME, OR GET ALONG WITH OTHER PEOPLE: SOMEWHAT DIFFICULT
SUM OF ALL RESPONSES TO PHQ QUESTIONS 1-9: 3

## 2022-12-28 NOTE — PROGRESS NOTES
Procedure Note-Nexplanon Removal and Reinsertion    Na De León is here for removal of etonogestrel implant Nexplanon/Implanon.    Indication: removal/reinsert nexplanon    Consent: Affirmation of informed consent was signed and scanned into the medical record. Risks, benefits and alternatives were discussed. Patient's questions were elicited and answered.     Procedure safety checklist was completed:  Yes  Time Out (Pause for the Cause) completed: Yes    Preoperative Diagnosis: etonogestrel implant    Postoperative Diagnosis: etonogestrel implant removed    Removal Technique:   Skin prep Betadine  Anesthesia 1% lidocaine, with epi  Procedure: Patient was placed supine with left arm exposed. Nexplanon was noted to have migrated mildly distally from prior insertion site/scar. Small incision (<5mm) was made at distal end of palpable implant, curved hemostat was used to isolate the implant and bring it to the incision, the fibrous capsule containing the implant  was incised and the Implant was removed intact.  EBL: minimal  Complications:  No  Tolerance:  Pt tolerated procedure well and was in stable condition.         Reinsertion Technique:   Given prior nexplanon had migrated distally I was not able to use the removal incision for reinsertion. Sahil was made 8cm above medial epicondial and a guiding sahil 4 cm above the first.  Arm was prepped with betadine. Insertion point was anesthetized with 3mL of 1% lidocaine. After stretching the skin with thumb and index finger around the insertion site, skin punctured with the tip of the needle inserted at 30 degrees and then lowered to horizontal position. While lifting the skin with the tip of the needle, slid the needle to it's full length. Applicator was then stabilized and the purple slider was unlocked by pushing it slightly down. Slider moved back completely until it stopped. Applicator was then removed. Correct placement of the implant was confirmed by palpation  in the patient's arm and visualizing the purple top of the obturator.  Insertion site was dressed with an adhesive covered by a pressure dressing.      User card and patient chart label filled out. User card  given to patient for record. Nexplanon added to medication list     Lot# [id number]    EBL: minimal  Complications:  No  Tolerance:  Pt tolerated procedure well and was in stable condition.     Follow up: Pt was instructed to call if bleeding, severe pain or foul smell.     Frances Don MD  [facility]

## 2022-12-29 NOTE — ED TRIAGE NOTES
Had nexplanon removed and re-placed in right upper arm around 1730, pt noted numbness in left hand around 1830, pt unwrapped when noted numbness but has not resolved. Pt denies pain, just pins and needles sensation palm and 4th and 5th fingers on left hand. Pt notes slight weakness in that hand as well.     Triage Assessment     Row Name 12/28/22 2022       Triage Assessment (Adult)    Airway WDL WDL       Respiratory WDL    Respiratory WDL WDL       Skin Circulation/Temperature WDL    Skin Circulation/Temperature WDL WDL       Cardiac WDL    Cardiac WDL WDL       Peripheral/Neurovascular WDL    Peripheral Neurovascular WDL WDL       Cognitive/Neuro/Behavioral WDL    Cognitive/Neuro/Behavioral WDL WDL

## 2022-12-29 NOTE — TELEPHONE ENCOUNTER
Pt calling regarding numbness and tingling of left hand started  one hour ago. Had office visit today for  Nexplanon removal and reinsertion in left arm. Care advise to go to ED to rule out neurological deficit. Pt undecided weather she wants to go to ED she will think about it. Presently she  has sent a message to PCP in her my chart  and has made an appointment with scheduling.  Suly Wong RN on 12/28/2022 at 7:58 PM    Reason for Disposition    [1] Tingling (e.g., pins and needles) of the face, arm / hand, or leg / foot on one side of the body AND [2] present now (Exceptions: chronic/recurrent symptom lasting > 4 weeks or tingling from known cause, such as: bumped elbow, carpal tunnel syndrome, pinched nerve, frostbite)    Additional Information    Negative: [1] SEVERE weakness (i.e., unable to walk or barely able to walk, requires support) AND [2] new-onset or worsening    Negative: [1] Weakness (i.e., paralysis, loss of muscle strength) of the face, arm / hand, or leg / foot on one side of the body AND [2] sudden onset AND [3] present now (Exception: Bell's palsy suspected [i.e., weakness only on one side of the face, developing over hours to days, no other symptoms])    Negative: [1] Numbness (i.e., loss of sensation) of the face, arm / hand, or leg / foot on one side of the body AND [2] sudden onset AND [3] present now    Negative: [1] Loss of speech or garbled speech AND [2] sudden onset AND [3] present now    Negative: Difficult to awaken or acting confused (e.g., disoriented, slurred speech)    Negative: Sounds like a life-threatening emergency to the triager    Negative: Headache  (and neurologic deficit)    Negative: [1] Back pain AND [2] numbness (loss of sensation) in groin or rectal area    Negative: [1] Unable to urinate (or only a few drops) > 4 hours AND [2] bladder feels very full (e.g., palpable bladder or strong urge to urinate)    Negative: [1] Loss of bladder or bowel control (urine or  bowel incontinence; wetting self, leaking stool) AND [2] new-onset    Negative: [1] Weakness (i.e., paralysis, loss of muscle strength) of the face, arm / hand, or leg / foot on one side of the body AND [2] sudden onset AND [3] brief (now gone)    Negative: [1] Numbness (i.e., loss of sensation) of the face, arm / hand, or leg / foot on one side of the body AND [2] sudden onset AND [3] brief (now gone)    Negative: [1] Loss of speech or garbled speech AND [2] sudden onset AND [3] brief (now gone)    Negative: Bell's palsy suspected (i.e., weakness on only one side of the face, developing over hours to days, no other symptoms)    Negative: Patient sounds very sick or weak to the triager    Negative: Neck pain (and neurologic deficit)    Negative: Back pain (and neurologic deficit)    Negative: [1] Weakness of the face, arm / hand, or leg / foot on one side of the body AND [2] gradual onset (e.g., days to weeks) AND [3] present now    Negative: [1] Numbness (i.e., loss of sensation) of the face, arm / hand, or leg / foot on one side of the body AND [2] gradual onset (e.g., days to weeks) AND [3] present now    Negative: [1] Loss of speech or garbled speech AND [2] gradual onset (e.g., days to weeks) AND [3] present now    Protocols used: NEUROLOGIC DEFICIT-A-AH

## 2022-12-29 NOTE — ED PROVIDER NOTES
EMERGENCY DEPARTMENT ENCOUNTER      NAME: Na De León  AGE: 22 year old female  YOB: 2000  MRN: [id number]  EVALUATION DATE & TIME: No admission date for patient encounter.    PCP: Frances Don    ED PROVIDER: Ramos Mayo M.D.    Chief Complaint   Patient presents with     Numbness       FINAL IMPRESSION:  No diagnosis found.    ED COURSE & MEDICAL DECISION MAKING:    Pertinent Labs & Imaging studies independently interpreted by me. (See chart for details)  9:19 PM  Patient seen and examined in [facility] due to critical capacity in the emergency part, external records reviewed and show history of some paresthesias as well as history of adjustment disorder and anxiety, removal and reinsertion of Nexplanon done today.  Patient presents with numbness of the hypothenar region as well as fourth and fifth fingers of the left hand.  This is primarily palmar, and intact on the dorsum.  Strength is intact.  Symptoms represent ulnar nerve injury, review of literature shows this is a known complication of Nexplanon removal.  No loss of sensation of the forearm.  Discussed clinical course of this.  Patient will follow-up with neurology but likely this will just need to heal over time, we discussed that this could take several months to normalize.    At the conclusion of the encounter I discussed the results of all of the tests and the disposition. The questions were answered. The patient or family acknowledged understanding and was agreeable with the care plan.     Medical Decision Making    History:    Supplemental history from: Documented in HPI, if applicable    External Record(s) reviewed: Documented in HPI, if applicable.    Work Up:    Chart documentation includes differential considered and any EKGs or imaging independently interpreted by provider.    In additional to work up documented, I considered the following work up: See chart documentation, if applicable.    External  consultation:    Discussion of management with another provider: See chart documentation, if applicable    Complicating factors:    Care impacted by chronic illness: N/A    Care affected by social determinants of health: N/A    Disposition considerations: Discharge. No recommendations on prescription strength medication(s).  Admission was not considered    PROCEDURES:       MEDICATIONS GIVEN IN THE EMERGENCY:  Medications - No data to display    NEW PRESCRIPTIONS STARTED AT TODAY'S ER VISIT  New Prescriptions    No medications on file       =================================================================    HPI    Patient information was obtained from: Patient      Na De León is a 22 year old female with a pertinent history of recent Nexplanon removal who presents to this ED today with complaints of numbness of her hand after having Nexplanon removed earlier today.  She reports that she noticed this shortly after her procedure.  She removed the compression dressing as she thought that might be causing problems.  The numbness is in the left hand on the fourth and fifth fingers.  She feels like the hand might be a little bit weak as well.      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 Indocyanine green (ICG) angiography.;  Surgeon: Jacinto Campbell MD;  Location:  OR     DENTAL SURGERY      wisdom teeth       CURRENT MEDICATIONS:    Current Facility-Administered Medications   Medication     etonogestrel (NEXPLANON) subdermal implant 68 mg     Current Outpatient Medications   Medication      "butalbital-acetaminophen-caffeine (ESGIC) -40 MG tablet     escitalopram (LEXAPRO) 20 MG tablet     etonogestrel (NEXPLANON) 68 MG IMPL     hydrOXYzine (VISTARIL) 50 MG capsule       ALLERGIES:  Allergies   Allergen Reactions     Grass Extracts [Gramineae Pollens]      Other reaction(s): Other (see comments)  Seasonal     Other Environmental Allergy Unknown     Seasonal       FAMILY HISTORY:  Family History   Problem Relation Age of Onset     Basal cell carcinoma Mother      Hyperlipidemia Father      Anxiety Disorder Sister      Anxiety Disorder Sister      No Known Problems Sister      Chronic Obstructive Pulmonary Disease Maternal Grandmother      Rheumatoid Arthritis Maternal Grandmother      Hypertension Maternal Grandmother      Depression Maternal Grandfather      Alzheimer Disease Maternal Grandfather      Prostate Cancer Maternal Grandfather      Pacemaker Paternal Grandmother      Deep Vein Thrombosis (DVT) No family hx of        SOCIAL HISTORY:   Social History     Socioeconomic History     Marital status: Single   Tobacco Use     Smoking status: Never     Smokeless tobacco: Never   Substance and Sexual Activity     Alcohol use: Yes     Drug use: No     Sexual activity: Yes     Partners: Male     Birth control/protection: Implant   Social History Narrative    Lives at home with mother, father and 3 sisters.       VITALS:  BP 96/68   Pulse 78   Temp 98.2  F (36.8  C) (Oral)   Resp 16   Ht 1.6 m (5' 3\")   Wt 54.4 kg (120 lb)   LMP 12/14/2022 (Approximate)   SpO2 99%   BMI 21.26 kg/m      PHYSICAL EXAM:  Physical Exam  Vitals and nursing note reviewed.   Constitutional:       Appearance: Normal appearance.   HENT:      Head: Normocephalic and atraumatic.      Right Ear: External ear normal.      Left Ear: External ear normal.      Nose: Nose normal.   Eyes:      Extraocular Movements: Extraocular movements intact.      Conjunctiva/sclera: Conjunctivae normal.   Pulmonary:      Effort: Pulmonary " effort is normal.   Musculoskeletal:         General: Normal range of motion.      Cervical back: Normal range of motion.      Right lower leg: No edema.      Left lower leg: No edema.   Skin:     General: Skin is warm and dry.   Neurological:      Mental Status: She is alert and oriented to person, place, and time. Mental status is at baseline.      Comments: Decreased sensation on the palmar hypothenar eminence and fourth and fifth fingers, no decrease sensation on the dorsum of the hand.  Strength of , flexion extension at the MCP, DIP, and IP joints of the fourth and fifth fingers is intact.  Thenar strength is intact.  Wrist flexion extension strength intact.   Psychiatric:         Mood and Affect: Mood normal.         Behavior: Behavior normal.         Thought Content: Thought content normal.          Ramos Mayo M.D.  Emergency Medicine  [facility] EMERGENCY ROOM  [address] [id number]  [phone]  Dept: [phone]     Ramos Mayo MD  12/28/22 2121

## 2023-07-21 ENCOUNTER — PATIENT OUTREACH (OUTPATIENT)
Dept: FAMILY MEDICINE | Facility: CLINIC | Age: 23
End: 2023-07-21
Payer: COMMERCIAL

## 2023-07-21 PROBLEM — R87.610 ATYPICAL SQUAMOUS CELLS OF UNDETERMINED SIGNIFICANCE (ASCUS) ON PAPANICOLAOU SMEAR OF CERVIX: Status: ACTIVE | Noted: 2022-08-18

## 2023-07-28 DIAGNOSIS — G44.209 TENSION-TYPE HEADACHE, NOT INTRACTABLE, UNSPECIFIED CHRONICITY PATTERN: ICD-10-CM

## 2023-07-28 NOTE — TELEPHONE ENCOUNTER
Pending Prescriptions:                       Disp   Refills    butalbital-acetaminophen-caffeine (ESGIC)*60 tab*1            Sig: Take 1 tablet by mouth every 4 hours as needed           for headaches

## 2023-07-28 NOTE — TELEPHONE ENCOUNTER
Routing refill request to provider for review/approval because:  Drug not on the [facility] refill protocol   A break in medication    Last Written Prescription Date:  1/11/2022  Last Fill Quantity: 60,  # refills: 1   Last office visit provider:  12/28/2022     Requested Prescriptions   Pending Prescriptions Disp Refills    butalbital-acetaminophen-caffeine (ESGIC) -40 MG tablet 60 tablet 1     Sig: Take 1 tablet by mouth every 4 hours as needed for headaches       There is no refill protocol information for this order          Patti Springer RN 07/28/23 3:12 PM

## 2023-07-29 RX ORDER — BUTALBITAL, ACETAMINOPHEN AND CAFFEINE 50; 325; 40 MG/1; MG/1; MG/1
1 TABLET ORAL EVERY 6 HOURS PRN
Qty: 60 TABLET | Refills: 0 | Status: SHIPPED | OUTPATIENT
Start: 2023-07-29

## 2023-08-17 NOTE — TELEPHONE ENCOUNTER
Patient due for Pap.    Reminder done today via telephone call-spoke to the pt she will call to schedule this.

## 2023-10-07 ENCOUNTER — HEALTH MAINTENANCE LETTER (OUTPATIENT)
Age: 23
End: 2023-10-07

## 2023-12-04 NOTE — PROGRESS NOTES
Assessment/Plan:   Na is a 23 year old female here for physical with additional concern    Routine adult health maintenance  Physical completed today.  Vaccines as below.  Labs as below.  Pap smear completed today.  - Hemoglobin    Atypical squamous cells of undetermined significance (ASCUS) on Papanicolaou smear of cervix  Cervical cancer screening  History of abnormal Pap smear, Pap smear completed today.  - Pap Screen only - recommended age 21 - 24 years    Encounter for vaccination  Due for vaccines as below, administered today.  - INFLUENZA VACCINE IM > 6 MONTHS VALENT IIV4 (AFLURIA/FLUZONE)  - COVID-19 12+ (2023-24) (PFIZER)    Vitamin D deficiency  History of vitamin D deficiency, recheck level today.  - Vitamin D deficiency screening    Forgetfulness  Concussion without loss of consciousness, subsequent encounter  Patient reports issues with forgetfulness at work and is worried about this.  Feels some increased anxiety/stress after starting new job, we discussed this could be because of forgetfulness; patient is worried that her history of multiple concussions is caused some damage and is interested in getting testing for this, referral placed today for neuropsych testing.  - Adult Mental Health  Referral       I have had an Advance Directives discussion with the patient.    Follow up: 1 year for physical, sooner as needed    Frances Don MD  [facility]      Subjective:     Na De León is a 23 year old female who presents for an annual exam.     Answers submitted by the patient for this visit:  Patient Health Questionnaire (Submitted on 12/7/2023)  If you checked off any problems, how difficult have these problems made it for you to do your work, take care of things at home, or get along with other people?: Not difficult at all  PHQ9 TOTAL SCORE: 2  Annual Preventive Visit (Submitted on 12/7/2023)  Chief Complaint: Annual Exam:  Frequency of exercise:: 1  day/week  Getting at least 3 servings of Calcium per day:: Yes  Diet:: Other  Taking medications regularly:: Yes  Medication side effects:: None  Bi-annual eye exam:: NO  Dental care twice a year:: Yes  Sleep apnea or symptoms of sleep apnea:: None  abdominal pain: Yes  Blood in stool: Yes  Blood in urine: No  chest pain: No  chills: No  congestion: No  constipation: Yes  cough: No  diarrhea: No  dizziness: No  ear pain: No  eye pain: No  nervous/anxious: Yes  fever: No  frequency: No  genital sores: No  headaches: No  hearing loss: No  heartburn: No  arthralgias: No  joint swelling: No  peripheral edema: No  mood changes: No  myalgias: No  nausea: No  dysuria: No  palpitations: No  Skin sensation changes: No  sore throat: No  urgency: No  rash: No  shortness of breath: No  visual disturbance: No  weakness: No  pelvic pain: No  vaginal bleeding: No  vaginal discharge: No  tenderness: No  breast mass: No  breast discharge: No  Additional concerns today:: Yes  Exercise outside of work (Submitted on 12/7/2023)  Chief Complaint: Annual Exam:  Duration of exercise:: Less than 15 minutes    Sees therapist weekly or so - a few instances at work where she completely forgot something (states usually forgets new things that she hasn't done before), feeling more forgetful, over past 3 weeks or so  -hx concussions  -states maybe a little more anxious lately - using hydroxyzine PRN and lexapro - there is some stress - but not necessarily dramatic/significant anxiety, depression doing well - meets with psychiatrist next week    Has upcoming procedure at [place] next week     Health Maintenance reviewed:  Lipid Profile: no  Glucose Screen: no  Colonoscopy: no  Mammogram: no    Gynecologic History  Patient's last menstrual period was 11/08/2023 (approximate).  Contraception: Nexplanon - doing well - gets periods, moderate to heavier flow - regular  Last Pap: 2022. Results were: ASCUS - due now    Immunization History   Administered  Date(s) Administered    COVID-19 MONOVALENT 12+ (Pfizer) 03/30/2021, 04/22/2021, 12/21/2021    DTAP (<7y) 10/11/2005    DTaP, Unspecified 2000, 01/29/2001, 04/18/2001, 01/29/2002, 10/11/2005    Flu, Unspecified 11/15/2008, 12/07/2009, 11/12/2012, 10/09/2019, 09/29/2020    Flu-nasal, Unspecified 11/14/2011    HPV Quadrivalent 08/07/2015    HPV9 10/19/2015, 03/31/2016    HepA, Unspecified 12/07/2009, 11/14/2011    HepB, Unspecified 2000, 01/29/2001, 12/18/2001    Hepatitis A (ADULT 19+) 12/07/2009, 11/14/2011    Hib, Unspecified 2000, 01/29/2001, 12/18/2001    Influenza (H1N1) 11/21/2009    Influenza (IIV3) PF 10/11/2005, 11/17/2005, 02/17/2007, 11/15/2008, 11/12/2012, 10/21/2014    Influenza Intranasal Vaccine 11/14/2011    Influenza Vaccine >6 months,quad, PF 12/27/2013, 12/27/2013, 10/18/2021, 12/12/2022    Influenza Vaccine IM Ages 6-35 Months 4 Valent (PF) 09/29/2020    Influenza Vaccine, 6+MO IM (QUADRIVALENT W/PRESERVATIVES) 10/21/2014, 10/19/2015, 11/09/2018    Influenza, seasonal, injectable, PF 12/07/2009    Influenza,INJ,MDCK,PF,Quad >6mo(Flucelvax) 09/29/2020    MMR 10/11/2001, 10/11/2005    Meningococcal ACWY (Menactra ) 11/14/2011, 06/30/2017    Meningococcal B (Bexsero ) 02/01/2019, 03/21/2019    Pneumococcal (PCV 7) 2000, 01/29/2001, 06/26/2001    Poliovirus, inactivated (IPV) 2000, 01/29/2001, 03/20/2001, 10/11/2005    TDAP (Adacel,Boostrix) 11/14/2011, 03/02/2022    Varicella 11/26/2001, 12/07/2009     Immunization status: up to date and documented.    Current Outpatient Medications   Medication Sig Dispense Refill    buPROPion (WELLBUTRIN XL) 150 MG 24 hr tablet Take 150 mg by mouth every morning      butalbital-acetaminophen-caffeine (ESGIC) -40 MG tablet Take 1 tablet by mouth every 6 hours as needed for headaches 60 tablet 0    escitalopram (LEXAPRO) 20 MG tablet Take 20 mg by mouth daily      hydrOXYzine (VISTARIL) 50 MG capsule TAKE 1-2 CAPSULE ( MG) BY  MOUTH 3 TIMES PER DAY AS NEEDED      spironolactone (ALDACTONE) 50 MG tablet Take 150 mg by mouth daily       Past Medical History:   Diagnosis Date    Anxiety     Concussion     Migraine     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 Indocyanine green (ICG) angiography.;  Surgeon: Jacinto Campbell MD;  Location:  OR    DENTAL SURGERY      wisdom teeth     Grass extracts [gramineae pollens] and Other environmental allergy  Family History   Problem Relation Age of Onset    Basal cell carcinoma Mother     Hyperlipidemia Father     Anxiety Disorder Sister     Anxiety Disorder Sister     No Known Problems Sister     Chronic Obstructive Pulmonary Disease Maternal Grandmother     Rheumatoid Arthritis Maternal Grandmother     Hypertension Maternal Grandmother     Depression Maternal Grandfather     Alzheimer Disease Maternal Grandfather     Prostate Cancer Maternal Grandfather     Pacemaker Paternal Grandmother     Deep Vein Thrombosis (DVT) No family hx of      Social History     Socioeconomic History    Marital status: Single     Spouse name: Not on file    Number of children: Not on file    Years of education: Not on file    Highest education level: Not on file   Occupational History    Not on file   Tobacco Use    Smoking status: Never    Smokeless tobacco: Never   Substance and Sexual Activity    Alcohol use: Yes    Drug use: No    Sexual activity: Yes     Partners: Male     Birth control/protection: Implant   Other Topics Concern    Not on file   Social History Narrative    Lives at home with mother, father and 3 sisters.     Social Determinants of Health     Financial Resource Strain: Low Risk  (12/7/2023)    Financial Resource Strain     Within the past 12 months, have you or your family members you live with  "been unable to get utilities (heat, electricity) when it was really needed?: No   Food Insecurity: Low Risk  (12/7/2023)    Food Insecurity     Within the past 12 months, did you worry that your food would run out before you got money to buy more?: No     Within the past 12 months, did the food you bought just not last and you didn t have money to get more?: No   Transportation Needs: Low Risk  (12/7/2023)    Transportation Needs     Within the past 12 months, has lack of transportation kept you from medical appointments, getting your medicines, non-medical meetings or appointments, work, or from getting things that you need?: No   Physical Activity: Not on file   Stress: Not on file   Social Connections: Not on file   Interpersonal Safety: Low Risk  (12/8/2023)    Interpersonal Safety     Do you feel physically and emotionally safe where you currently live?: Yes     Within the past 12 months, have you been hit, slapped, kicked or otherwise physically hurt by someone?: No     Within the past 12 months, have you been humiliated or emotionally abused in other ways by your partner or ex-partner?: No   Housing Stability: Low Risk  (12/7/2023)    Housing Stability     Do you have housing? : Yes     Are you worried about losing your housing?: No       Review of Systems negative unless noted    Objective:        Vitals:    12/08/23 0739   BP: 104/66   Pulse: 86   Resp: 14   Temp: 98.6  F (37  C)   TempSrc: Temporal   SpO2: 96%   Weight: 61 kg (134 lb 6.4 oz)   Height: 1.612 m (5' 3.47\")   PainSc: No Pain (0)     Body mass index is 23.46 kg/m .    Physical Exam:  General Appearance: Alert, pleasant, appears stated age  Head: Normocephalic, without obvious abnormality  Eyes: PERRL, conjunctiva/corneas clear, EOM's intact  Ears: Normal TM's and external ear canals, both ears  Nose: Nares normal, septum midline,mucosa normal, no drainage  Throat: Lips, mucosa, and tongue normal; teeth and gums normal; oropharynx is " clear  Neck: Supple,without lymphadenopathy, no thyromegaly or nodules noted  Lungs: Clear to auscultation bilaterally, respirations unlabored, no wheezing or crackles  Heart: Regular rate and rhythm, no murmur   Abdomen: Soft, non-tender, no masses, no organomegaly  Extremities: Extremities with strong and symmetric pulses, no cyanosis or edema  Skin: Skin color, texture normal, no rashes or lesions  Neurologic: Grossly normal, no focal deficits  Pelvic exam: Normal external genitalia, normal-appearing cervix, mild discharge

## 2023-12-07 ASSESSMENT — ENCOUNTER SYMPTOMS
MYALGIAS: 0
ARTHRALGIAS: 0
CONSTIPATION: 1
EYE PAIN: 0
WEAKNESS: 0
PALPITATIONS: 0
HEARTBURN: 0
FREQUENCY: 0
ABDOMINAL PAIN: 1
NERVOUS/ANXIOUS: 1
HEADACHES: 0
DYSURIA: 0
SHORTNESS OF BREATH: 0
SORE THROAT: 0
CHILLS: 0
DIZZINESS: 0
HEMATOCHEZIA: 1
PARESTHESIAS: 0
HEMATURIA: 0
BREAST MASS: 0
DIARRHEA: 0
FEVER: 0
NAUSEA: 0
JOINT SWELLING: 0
COUGH: 0

## 2023-12-07 ASSESSMENT — PATIENT HEALTH QUESTIONNAIRE - PHQ9
10. IF YOU CHECKED OFF ANY PROBLEMS, HOW DIFFICULT HAVE THESE PROBLEMS MADE IT FOR YOU TO DO YOUR WORK, TAKE CARE OF THINGS AT HOME, OR GET ALONG WITH OTHER PEOPLE: NOT DIFFICULT AT ALL
SUM OF ALL RESPONSES TO PHQ QUESTIONS 1-9: 2
SUM OF ALL RESPONSES TO PHQ QUESTIONS 1-9: 2

## 2023-12-08 ENCOUNTER — OFFICE VISIT (OUTPATIENT)
Dept: FAMILY MEDICINE | Facility: CLINIC | Age: 23
End: 2023-12-08
Attending: FAMILY MEDICINE
Payer: COMMERCIAL

## 2023-12-08 VITALS
DIASTOLIC BLOOD PRESSURE: 66 MMHG | HEIGHT: 63 IN | BODY MASS INDEX: 23.81 KG/M2 | RESPIRATION RATE: 14 BRPM | OXYGEN SATURATION: 96 % | WEIGHT: 134.4 LBS | SYSTOLIC BLOOD PRESSURE: 104 MMHG | TEMPERATURE: 98.6 F | HEART RATE: 86 BPM

## 2023-12-08 DIAGNOSIS — Z00.00 ROUTINE ADULT HEALTH MAINTENANCE: Primary | ICD-10-CM

## 2023-12-08 DIAGNOSIS — Z12.4 CERVICAL CANCER SCREENING: ICD-10-CM

## 2023-12-08 DIAGNOSIS — R68.89 FORGETFULNESS: ICD-10-CM

## 2023-12-08 DIAGNOSIS — R87.610 ATYPICAL SQUAMOUS CELLS OF UNDETERMINED SIGNIFICANCE (ASCUS) ON PAPANICOLAOU SMEAR OF CERVIX: ICD-10-CM

## 2023-12-08 DIAGNOSIS — S06.0X0D CONCUSSION WITHOUT LOSS OF CONSCIOUSNESS, SUBSEQUENT ENCOUNTER: ICD-10-CM

## 2023-12-08 DIAGNOSIS — E55.9 VITAMIN D DEFICIENCY: ICD-10-CM

## 2023-12-08 DIAGNOSIS — Z23 ENCOUNTER FOR VACCINATION: ICD-10-CM

## 2023-12-08 PROBLEM — Z90.49 ACQUIRED ABSENCE OF OTHER SPECIFIED PARTS OF DIGESTIVE TRACT: Status: ACTIVE | Noted: 2022-04-12

## 2023-12-08 PROBLEM — K60.2 ANAL FISSURE: Status: ACTIVE | Noted: 2023-08-15

## 2023-12-08 PROBLEM — K37 APPENDICITIS: Status: ACTIVE | Noted: 2022-02-11

## 2023-12-08 LAB
HGB BLD-MCNC: 13.2 G/DL (ref 11.7–15.7)
VIT D+METAB SERPL-MCNC: 19 NG/ML (ref 20–50)

## 2023-12-08 PROCEDURE — 82306 VITAMIN D 25 HYDROXY: CPT | Performed by: FAMILY MEDICINE

## 2023-12-08 PROCEDURE — 99395 PREV VISIT EST AGE 18-39: CPT | Mod: 25 | Performed by: FAMILY MEDICINE

## 2023-12-08 PROCEDURE — 99213 OFFICE O/P EST LOW 20 MIN: CPT | Mod: 25 | Performed by: FAMILY MEDICINE

## 2023-12-08 PROCEDURE — 85018 HEMOGLOBIN: CPT | Performed by: FAMILY MEDICINE

## 2023-12-08 PROCEDURE — 90480 ADMN SARSCOV2 VAC 1/ONLY CMP: CPT | Performed by: FAMILY MEDICINE

## 2023-12-08 PROCEDURE — 91320 SARSCV2 VAC 30MCG TRS-SUC IM: CPT | Performed by: FAMILY MEDICINE

## 2023-12-08 PROCEDURE — G0145 SCR C/V CYTO,THINLAYER,RESCR: HCPCS | Performed by: FAMILY MEDICINE

## 2023-12-08 PROCEDURE — 36415 COLL VENOUS BLD VENIPUNCTURE: CPT | Performed by: FAMILY MEDICINE

## 2023-12-08 PROCEDURE — G0124 SCREEN C/V THIN LAYER BY MD: HCPCS | Performed by: PATHOLOGY

## 2023-12-08 PROCEDURE — 90686 IIV4 VACC NO PRSV 0.5 ML IM: CPT | Performed by: FAMILY MEDICINE

## 2023-12-08 PROCEDURE — 90471 IMMUNIZATION ADMIN: CPT | Performed by: FAMILY MEDICINE

## 2023-12-08 RX ORDER — SPIRONOLACTONE 50 MG/1
150 TABLET, FILM COATED ORAL DAILY
COMMUNITY

## 2023-12-08 RX ORDER — BUPROPION HYDROCHLORIDE 150 MG/1
150 TABLET ORAL EVERY MORNING
COMMUNITY

## 2023-12-08 ASSESSMENT — ANXIETY QUESTIONNAIRES
3. WORRYING TOO MUCH ABOUT DIFFERENT THINGS: NOT AT ALL
5. BEING SO RESTLESS THAT IT IS HARD TO SIT STILL: NOT AT ALL
GAD7 TOTAL SCORE: 0
1. FEELING NERVOUS, ANXIOUS, OR ON EDGE: NOT AT ALL
7. FEELING AFRAID AS IF SOMETHING AWFUL MIGHT HAPPEN: NOT AT ALL
6. BECOMING EASILY ANNOYED OR IRRITABLE: NOT AT ALL
GAD7 TOTAL SCORE: 0
2. NOT BEING ABLE TO STOP OR CONTROL WORRYING: NOT AT ALL
IF YOU CHECKED OFF ANY PROBLEMS ON THIS QUESTIONNAIRE, HOW DIFFICULT HAVE THESE PROBLEMS MADE IT FOR YOU TO DO YOUR WORK, TAKE CARE OF THINGS AT HOME, OR GET ALONG WITH OTHER PEOPLE: NOT DIFFICULT AT ALL

## 2023-12-08 ASSESSMENT — PAIN SCALES - GENERAL: PAINLEVEL: NO PAIN (0)

## 2023-12-08 ASSESSMENT — PATIENT HEALTH QUESTIONNAIRE - PHQ9: 5. POOR APPETITE OR OVEREATING: NOT AT ALL

## 2023-12-13 LAB
BKR LAB AP GYN ADEQUACY: ABNORMAL
BKR LAB AP GYN INTERPRETATION: ABNORMAL
BKR LAB AP HPV REFLEX: NO
BKR LAB AP PREVIOUS ABNORMAL: ABNORMAL
PATH REPORT.COMMENTS IMP SPEC: ABNORMAL
PATH REPORT.COMMENTS IMP SPEC: ABNORMAL
PATH REPORT.RELEVANT HX SPEC: ABNORMAL

## 2023-12-14 ENCOUNTER — PATIENT OUTREACH (OUTPATIENT)
Dept: FAMILY MEDICINE | Facility: CLINIC | Age: 23
End: 2023-12-14
Payer: COMMERCIAL

## 2023-12-20 ENCOUNTER — NURSE TRIAGE (OUTPATIENT)
Dept: NURSING | Facility: CLINIC | Age: 23
End: 2023-12-20
Payer: COMMERCIAL

## 2023-12-21 NOTE — TELEPHONE ENCOUNTER
Provider Recommendation Follow Up:   Unable to reach patient/caregiver. Left message to return call to clinic. Upon return call please notify caller of provider's recommendations.        KARL SalgadoN, RN  [facility]

## 2023-12-21 NOTE — TELEPHONE ENCOUNTER
Agree with antihistamines and then recommend reaching out to [place] provider to see if they have any other recommendations or thoughts since they did the procedure.    Dr Don

## 2023-12-21 NOTE — TELEPHONE ENCOUNTER
Nurse Triage SBAR    Is this a 2nd Level Triage? NO    Situation: Pt calling with concerns for itching.    Background: Pt states she had Botox injections into her rectum on Monday at [facility] for anal fissures. She has been itching since.    Assessment: Pt is itchy all over. Denies SOB, trouble swallowing, and rash.     Protocol Recommended Disposition:   See PCP Within 3 Days    Recommendation: Discussed taking antihistamines as well as an oatmeal bath. Pt would appreciate a call or MyChart message back regarding the above mentioned.     Reason for Disposition   [1] Widespread itching AND [2] cause unknown AND [3] present > 48 hours  (Exception: Caller knows the cause and can eliminate it.)    Additional Information   Negative: [1] Life-threatening reaction (anaphylaxis) in the past to similar substance (e.g., food, insect bite/sting, chemical, etc.) AND [2] < 2 hours since exposure   Negative: Difficulty breathing or wheezing   Negative: [1] Difficulty swallowing or slurred speech AND [2] sudden onset   Negative: Sounds like a life-threatening emergency to the triager   Negative: Patient sounds very sick or weak to the triager   Negative: [1] MODERATE-SEVERE widespread itching (i.e., interferes with sleep, normal activities or school) AND [2] not improved after 24 hours of itching Care Advice   Negative: [1] MODERATE-SEVERE widespread itching (i.e., interferes with sleep, normal activities or school) AND [2] pregnant   Negative: Taking prescription medication that could cause itching (e.g., codeine/morphine/other opiates, aspirin)   Negative: [1] Hand or foot itching AND [2] pregnant    Protocols used: Itching - Widespread-BRIAN-    Linda Coker RN  [facility] Nurse Advisor   12/20/2023  8:50 PM

## 2024-04-08 ENCOUNTER — MYC MEDICAL ADVICE (OUTPATIENT)
Dept: FAMILY MEDICINE | Facility: CLINIC | Age: 24
End: 2024-04-08
Payer: COMMERCIAL

## 2024-04-11 ENCOUNTER — LAB (OUTPATIENT)
Dept: LAB | Facility: CLINIC | Age: 24
End: 2024-04-11
Payer: COMMERCIAL

## 2024-04-11 ENCOUNTER — E-VISIT (OUTPATIENT)
Dept: URGENT CARE | Facility: CLINIC | Age: 24
End: 2024-04-11
Payer: COMMERCIAL

## 2024-04-11 DIAGNOSIS — N89.8 VAGINAL DISCHARGE: ICD-10-CM

## 2024-04-11 DIAGNOSIS — N76.0 BV (BACTERIAL VAGINOSIS): ICD-10-CM

## 2024-04-11 DIAGNOSIS — B96.89 BV (BACTERIAL VAGINOSIS): ICD-10-CM

## 2024-04-11 DIAGNOSIS — Z11.59 NEED FOR HEPATITIS C SCREENING TEST: Primary | ICD-10-CM

## 2024-04-11 DIAGNOSIS — N89.8 VAGINAL DISCHARGE: Primary | ICD-10-CM

## 2024-04-11 LAB
ALBUMIN UR-MCNC: NEGATIVE MG/DL
APPEARANCE UR: CLEAR
BILIRUB UR QL STRIP: NEGATIVE
CLUE CELLS: PRESENT
COLOR UR AUTO: YELLOW
GLUCOSE UR STRIP-MCNC: NEGATIVE MG/DL
HGB UR QL STRIP: NEGATIVE
KETONES UR STRIP-MCNC: NEGATIVE MG/DL
LEUKOCYTE ESTERASE UR QL STRIP: NEGATIVE
NITRATE UR QL: NEGATIVE
PH UR STRIP: 7 [PH] (ref 5–8)
SP GR UR STRIP: 1.02 (ref 1–1.03)
TRICHOMONAS, WET PREP: ABNORMAL
UROBILINOGEN UR STRIP-ACNC: 0.2 E.U./DL
WBC'S/HIGH POWER FIELD, WET PREP: ABNORMAL
YEAST, WET PREP: ABNORMAL

## 2024-04-11 PROCEDURE — 87210 SMEAR WET MOUNT SALINE/INK: CPT

## 2024-04-11 PROCEDURE — 99421 OL DIG E/M SVC 5-10 MIN: CPT | Performed by: PREVENTIVE MEDICINE

## 2024-04-11 PROCEDURE — 87491 CHLMYD TRACH DNA AMP PROBE: CPT

## 2024-04-11 PROCEDURE — 81003 URINALYSIS AUTO W/O SCOPE: CPT

## 2024-04-11 PROCEDURE — 86803 HEPATITIS C AB TEST: CPT

## 2024-04-11 PROCEDURE — 36415 COLL VENOUS BLD VENIPUNCTURE: CPT

## 2024-04-11 PROCEDURE — 87591 N.GONORRHOEAE DNA AMP PROB: CPT

## 2024-04-11 RX ORDER — METRONIDAZOLE 500 MG/1
500 TABLET ORAL 2 TIMES DAILY
Qty: 14 TABLET | Refills: 0 | Status: SHIPPED | OUTPATIENT
Start: 2024-04-11 | End: 2024-04-18

## 2024-04-11 NOTE — PATIENT INSTRUCTIONS
Thank you for choosing us for your care. Given your symptoms, I would like you to do a lab-only visit to determine what is causing them.  I have placed the orders.  Please schedule an appointment with the lab right here in [facility], or call [phone].  I will let you know when the results are back and next steps to take.  Vaginitis: Care Instructions  Overview     Vaginitis is soreness or infection of the vagina. This common problem can cause itching and burning. And it can cause a change in vaginal discharge. Sometimes it can cause pain during sex. Vaginitis may be caused by bacteria, yeast, or other germs. Some infections that cause it are caught from a sexual partner. Bath products, spermicides, and douches can irritate the vagina too.  This problem can also happen during and after menopause. A drop in estrogen levels during this time can cause dryness, soreness, and pain during sex.  Your doctor can give you medicine to treat vaginitis. And home care may help you feel better. For certain types of infections, your sex partner(s) must be treated too.  Follow-up care is a key part of your treatment and safety. Be sure to make and go to all appointments, and call your doctor if you are having problems. It's also a good idea to know your test results and keep a list of the medicines you take.  How can you care for yourself at home?  Take your medicines exactly as prescribed. Call your doctor if you think you are having a problem with your medicine.  Ask your doctor if your sex partner(s) also needs treatment.  Do not eat or drink anything that has alcohol if you are taking metronidazole (Flagyl).  Wash your vulva daily with water. You also can use a mild, unscented soap if you want.  Do not use scented bath products. And do not use vaginal sprays or douches.  Change out of wet or damp clothes as soon as possible. Wear cotton underwear. And avoid tight clothing that could increase moisture.  Put a washcloth soaked  "in cool water on the area to relieve itching. Or you can take cool baths.  If you have dryness because of menopause, use estrogen cream or pills that your doctor prescribes.  Ask your doctor about when it is okay to have sex.  Use a personal lubricant before sex if you have dryness. Examples are Astroglide, K-Y Jelly, and Wet Lubricant Gel.  When should you call for help?   Call your doctor now or seek immediate medical care if:    You have a fever.     You have new or increased pain in your vagina or pelvis.     You have new or worse vaginal itching or discharge.   Watch closely for changes in your health, and be sure to contact your doctor if:    You have bleeding other than your period.     You do not get better as expected.   Where can you learn more?  Go to https://www.[employer].net/patiented  Enter F219 in the search box to learn more about \"Vaginitis: Care Instructions.\"  Current as of: November 27, 2023               Content Version: 14.0    8688-9709 [employer].   Care instructions adapted under license by your healthcare professional. If you have questions about a medical condition or this instruction, always ask your healthcare professional. [employer] disclaims any warranty or liability for your use of this information.      "

## 2024-04-12 LAB
C TRACH DNA SPEC QL NAA+PROBE: NEGATIVE
HCV AB SERPL QL IA: NONREACTIVE
N GONORRHOEA DNA SPEC QL NAA+PROBE: NEGATIVE

## 2024-04-24 ENCOUNTER — MYC MEDICAL ADVICE (OUTPATIENT)
Dept: FAMILY MEDICINE | Facility: CLINIC | Age: 24
End: 2024-04-24
Payer: COMMERCIAL

## 2024-04-24 DIAGNOSIS — R30.0 DYSURIA: ICD-10-CM

## 2024-04-24 DIAGNOSIS — N89.8 VAGINAL ITCHING: Primary | ICD-10-CM

## 2024-04-26 ENCOUNTER — LAB (OUTPATIENT)
Dept: LAB | Facility: CLINIC | Age: 24
End: 2024-04-26
Payer: COMMERCIAL

## 2024-04-26 DIAGNOSIS — N89.8 VAGINAL ITCHING: ICD-10-CM

## 2024-04-26 DIAGNOSIS — R30.0 DYSURIA: ICD-10-CM

## 2024-04-26 LAB
ALBUMIN UR-MCNC: 30 MG/DL
APPEARANCE UR: CLEAR
BACTERIA #/AREA URNS HPF: ABNORMAL /HPF
BILIRUB UR QL STRIP: NEGATIVE
CLUE CELLS: PRESENT
COLOR UR AUTO: YELLOW
GLUCOSE UR STRIP-MCNC: NEGATIVE MG/DL
HGB UR QL STRIP: NEGATIVE
KETONES UR STRIP-MCNC: NEGATIVE MG/DL
LEUKOCYTE ESTERASE UR QL STRIP: NEGATIVE
NITRATE UR QL: NEGATIVE
PH UR STRIP: 5.5 [PH] (ref 5–8)
RBC #/AREA URNS AUTO: ABNORMAL /HPF
SP GR UR STRIP: 1.02 (ref 1–1.03)
SQUAMOUS #/AREA URNS AUTO: ABNORMAL /LPF
TRICHOMONAS, WET PREP: ABNORMAL
UROBILINOGEN UR STRIP-ACNC: 0.2 E.U./DL
WBC #/AREA URNS AUTO: ABNORMAL /HPF
WBC'S/HIGH POWER FIELD, WET PREP: ABNORMAL
YEAST, WET PREP: PRESENT

## 2024-04-26 PROCEDURE — 87210 SMEAR WET MOUNT SALINE/INK: CPT

## 2024-04-26 PROCEDURE — 81001 URINALYSIS AUTO W/SCOPE: CPT

## 2024-04-26 RX ORDER — FLUCONAZOLE 150 MG/1
TABLET ORAL
Qty: 2 TABLET | Refills: 0 | Status: SHIPPED | OUTPATIENT
Start: 2024-04-26 | End: 2024-07-09

## 2024-04-26 RX ORDER — METRONIDAZOLE 500 MG/1
500 TABLET ORAL 2 TIMES DAILY
Qty: 14 TABLET | Refills: 0 | Status: SHIPPED | OUTPATIENT
Start: 2024-04-26 | End: 2024-05-03

## 2024-05-09 ENCOUNTER — OFFICE VISIT (OUTPATIENT)
Dept: NEUROPSYCHOLOGY | Facility: CLINIC | Age: 24
End: 2024-05-09
Attending: FAMILY MEDICINE
Payer: COMMERCIAL

## 2024-05-09 DIAGNOSIS — R68.89 FORGETFULNESS: ICD-10-CM

## 2024-05-09 DIAGNOSIS — S06.0X0D CONCUSSION WITHOUT LOSS OF CONSCIOUSNESS, SUBSEQUENT ENCOUNTER: ICD-10-CM

## 2024-05-09 DIAGNOSIS — F41.9 ANXIETY: ICD-10-CM

## 2024-05-09 DIAGNOSIS — G31.84 MILD NEUROCOGNITIVE DISORDER: Primary | ICD-10-CM

## 2024-05-09 PROCEDURE — 96116 NUBHVL XM PHYS/QHP 1ST HR: CPT | Performed by: CLINICAL NEUROPSYCHOLOGIST

## 2024-05-09 PROCEDURE — 96138 PSYCL/NRPSYC TECH 1ST: CPT | Performed by: CLINICAL NEUROPSYCHOLOGIST

## 2024-05-09 PROCEDURE — 96132 NRPSYC TST EVAL PHYS/QHP 1ST: CPT | Performed by: CLINICAL NEUROPSYCHOLOGIST

## 2024-05-09 PROCEDURE — 96139 PSYCL/NRPSYC TST TECH EA: CPT | Performed by: CLINICAL NEUROPSYCHOLOGIST

## 2024-05-09 PROCEDURE — 96133 NRPSYC TST EVAL PHYS/QHP EA: CPT | Performed by: CLINICAL NEUROPSYCHOLOGIST

## 2024-05-09 NOTE — LETTER
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 was perfect.    Executive Functioning  Working memory skills were assessed as an average score (LDB = 6, LDS = 6). A complex sequencing and set shifting task was measured as an average score (46 ). This task was completed without error. Her performance on a measure of phonemic fluency resulted in a high average score (42). A semantic set shifting task was assessed as an exceptionally high score (Category Switching Accuracy = 20). On a measure of planning and problem-solving, overall performance was assessed as an average score in terms of total achievement (18). Her performance was not characterized by an overly slow or inaccurate response style (average score for Time per move ratio = 2.9, average score for Move accuracy ratio = 2.0). Her ability to inhibit an over-learned response was assessed as an average score (53\"). Her performance on this task was without error (high average score). On the more challenging set shifting portion of the task in which she had to alternate between providing and inhibiting an over-learned response, she earned an average score (50\"). This latter task was notable for one error " (average score).     Mood  She was administered the Symptom Checklist-90 Revised, a 90 item self-report inventory which is designed to reflect the psychological symptom patterns of psychiatric and medical patients. The Global Severity Index (GSI) is the most sensitive single indicator of the patient s distress level, combining information about numbers of symptoms and intensity of distress.  On this measure, she obtained a T score of 58, indicating the absence of significant psychological distress. Her T score of 66 on the Positive Symptom Distress Index (PSDI), suggests a slight tendency to exaggerate distress. A T score of 52 on the last global scale, the Positive Symptom Total (PST), suggests that she endorsed an average range of symptoms. On individual subscales, elevated scores were evident on the obsessive-compulsive and hostility subscales indicating concerns about cognitive functioning as well as indicators of irritability and resentment.     EVALUATION SERVICES AND TIME:   A clinical interview/neurobehavioral status examination was conducted with the patient and documented. I thoroughly reviewed the medical record, selected the neuropsychological test battery, provided supervision to the individual who administered and scored the neuropsychological test battery, interpreted/integrated patient data and test results, engaged in clinical decision making, treatment planning, report writing/preparation, and provided and documented interactive feedback of test results on the day of testing . A trained examiner/technician directly administered and scored 2+ neuropsychological tests. Please see below for a breakdown of time spent and the associated codes billed for these services.     Services   Time Spent  CPT Codes   Neurobehavioral Status Exam:  (e.g., face-to-face, interpretation, report) 40 minutes 1 x 96116   Neuropsychological Evaluation Services:   (e.g., integration, interpretation, treatment planning,  clinical decision making, feedback)   100 minutes   1 x 96132  1 x 96133        Neuropsychological Testing by Trained Examiner/Technician:  (e.g., test administration, scoring, 2+ tests administered)   105 minutes   1 x 96138  2 x 96139     Diagnosis:  No cognitive diagnosis (no cognitive effects from history of multiple concussions)  Anxiety (per chart)    For diagnostic and coding purposes, Ms. 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 Feedback of results was provided on the day of testing.       Teena Mcdermott, PhD, LP, ABPP  Clinical Neuropsychologist, [id number]  Board Certified in Clinical Neuropsychology    [facility], [address]  [place] [address]  Phone:  [phone]    The patient was seen for a neuropsychological evaluation for the purposes of diagnostic clarification and treatment planning. 80 minutes of face-to-face testing were provided by this writer. An additional 25 minutes were spent scoring and compiling test results. The patient was cooperative with testing. No concerns were brought to my attention. Please see Dr. Mcdermott's report for a detailed description of the charges and interpretation and integration of the findings.

## 2024-05-09 NOTE — PROGRESS NOTES
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 on a measure of phonemic fluency resulted in a high average score (42). A semantic set shifting task was assessed as an exceptionally high score (Category Switching Accuracy = 20). On a measure of planning and problem-solving, overall performance was assessed as an average score in terms of total achievement (18). Her performance was not characterized by an overly slow or inaccurate response style (average score for Time per move ratio = 2.9, average score for Move accuracy ratio = 2.0). Her ability to inhibit an over-learned response was assessed as an average score (53\"). Her performance on this task was without error (high average score). On the more challenging set shifting portion of the task in which she had to alternate between providing and inhibiting an over-learned response, she earned an average score (50\"). This latter task was notable for one error (average score).     Mood  She was administered the Symptom Checklist-90 Revised, a 90 item self-report inventory which is designed to reflect the psychological symptom patterns of psychiatric and medical patients. The Global Severity Index (GSI) is the most sensitive single indicator of the patient s " distress level, combining information about numbers of symptoms and intensity of distress.  On this measure, she obtained a T score of 58, indicating the absence of significant psychological distress. Her T score of 66 on the Positive Symptom Distress Index (PSDI), suggests a slight tendency to exaggerate distress. A T score of 52 on the last global scale, the Positive Symptom Total (PST), suggests that she endorsed an average range of symptoms. On individual subscales, elevated scores were evident on the obsessive-compulsive and hostility subscales indicating concerns about cognitive functioning as well as indicators of irritability and resentment.     EVALUATION SERVICES AND TIME:   A clinical interview/neurobehavioral status examination was conducted with the patient and documented. I thoroughly reviewed the medical record, selected the neuropsychological test battery, provided supervision to the individual who administered and scored the neuropsychological test battery, interpreted/integrated patient data and test results, engaged in clinical decision making, treatment planning, report writing/preparation, and provided and documented interactive feedback of test results on the day of testing . A trained examiner/technician directly administered and scored 2+ neuropsychological tests. Please see below for a breakdown of time spent and the associated codes billed for these services.     Services   Time Spent  CPT Codes   Neurobehavioral Status Exam:  (e.g., face-to-face, interpretation, report) 40 minutes 1 x 96116   Neuropsychological Evaluation Services:   (e.g., integration, interpretation, treatment planning, clinical decision making, feedback)   100 minutes   1 x 96132  1 x 96133        Neuropsychological Testing by Trained Examiner/Technician:  (e.g., test administration, scoring, 2+ tests administered)   105 minutes   1 x 96138  2 x 96139     Diagnosis:  No cognitive diagnosis (no cognitive effects from  history of multiple concussions)  Anxiety (per chart)    For diagnostic and coding purposes, Ms. 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 Feedback of results was provided on the day of testing.       Teena Mcdermott, PhD, LP, ABPP  Clinical Neuropsychologist, LP#[id number]  Board Certified in Clinical Neuropsychology    [facility] [address], [address]  [place] [address]  Phone:  [phone]

## 2024-05-13 NOTE — PATIENT INSTRUCTIONS
DIAGNOSTIC IMPRESSIONS (from 5/09/2024 Neuropsychology Consult):    Results  No evidence of acquired cognitive deficits  At least average performance across all cognitive domains assessed including attention, speed of thinking, language, spatial skills, learning, memory, and problem-solving  Subjective experience of cognitive inefficiencies in daily life most likely related to fluctuations in mood and sleep    Diagnosis  No cognitive diagnosis (no cognitive effects from history of multiple concussions)  Anxiety (per chart)    RECOMMENDATIONS:  Ms. De León and her providers should be reassured that her mental faculties are well preserved for her age  Emotional Health  Ms. De León is encouraged to continue with psychotherapy and pharmacotherapy (medications) to help manage her mood symptoms.   In addition, behavioral activation techniques such as regular exercise (under the guidance of her physician), recreational activities and regular social interaction would likely be effective in helping Ms. De León to manage her mood.   Memory, Attention, and Organization  Ms. De León did not demonstrate memory deficits on testing; however, such difficulties are more likely to appear when she is especially tired/ fatigued, in pain or struggling with mood symptoms. In those situations, she will benefit from the following strategies.  In her daily life, Ms. De León may find it helpful to post reminder notes around her home, make lists, and carry a small calendar so that she can feel more comfortable and confident in her ability to remember information. A daily planner could also be used as a memory book where important information is recorded and organized for future reference.   Ms. De León could also create a system to establish set locations for certain items (i.e., keys) such that she always knows where to put them upon entering her home and where to look when she needs them. If she would like to keep certain items out of  sight (i.e., a wallet), she could set up a specific hidden place to keep items and use that same place so as to ensure she can find the required item when needed.   It is recommended that Ms. De León work with her therapist to develop specific skills for managing inattention (which is often associated with elevated anxiety) in daily life. Examples of techniques and strategies include the following:  In general, Ms. De León will likely function best in environments that are relatively free of distractions or where substantial structure is available to re-orient attention and guide her through task goals. She likely will work best when she concentrates on one activity at a time for brief periods of time (likely less than 30 minutes) and takes frequent breaks, even brief ones, to break up the tedium of this activity.   It will be important to provide a very high level of structure in both professional and personal activities. Maintaining organization and a consistent routine in day-to-day activities will be helpful.   Ms. De León should be encouraged to break down large projects and tasks into manageable subcomponents or chunks each with its own deadline. For example, divide work into short  mini-assignments,  building breaks into the end of each segment. She should again use a daily planner and/or chart to monitor her progress.  Ms. De León should attempt to reduce distractions at home and at work. For example, having a clutter-free work environment (e.g., desk) and removing or at least making it harder to access potential distractions would help optimize her attention. She might also consider facing away from high traffic areas and using earplugs or noise cancellation headphones when desiring a quiet work environment.    Follow-up  Given Ms. De León's strong cognitive profile, no specific follow-up with Neuropsychology is recommended at this time.  However should cognitive concerns arise in the future,  re-evaluation is recommended and current results can be used as a baseline for future comparison.

## 2024-05-21 ENCOUNTER — MYC MEDICAL ADVICE (OUTPATIENT)
Dept: FAMILY MEDICINE | Facility: CLINIC | Age: 24
End: 2024-05-21
Payer: COMMERCIAL

## 2024-07-07 ENCOUNTER — MYC MEDICAL ADVICE (OUTPATIENT)
Dept: FAMILY MEDICINE | Facility: CLINIC | Age: 24
End: 2024-07-07
Payer: COMMERCIAL

## 2024-07-08 ENCOUNTER — NURSE TRIAGE (OUTPATIENT)
Dept: FAMILY MEDICINE | Facility: CLINIC | Age: 24
End: 2024-07-08
Payer: COMMERCIAL

## 2024-07-08 NOTE — TELEPHONE ENCOUNTER
"Nurse Triage SBAR    Is this a 2nd Level Triage? NO    Situation: Octavio Message: \"Recently over the past few days I have been feeling very motion sick. It started a few days ago and has continued until now. It has even been happening when I m just sitting down and not in the car. It s a constant feeling of nausea and a migraine. I am concerned about what is going on. What do you recommend me doing?\"    Background: no pertinent hx     Assessment: Pt stated that she noticed abut 4 days ago when she was in the car she was having some motion sickness, but since then the feeling of dizziness and mild vertigo has not gone away. Pt states that she was on a jet ski the other day but never fell off or anything. Pt states that her ear does feel heavier like there could be some fluid in the ear. Pt states that she has had some headaches and nausea since then and rated the headache at 6/10. Pt denies any other symptoms at this time.     Protocol Recommended Disposition:   See in Office Within 3 Days    Recommendation: Pt was given home care advice an scheduled at the [facility] on 7/9.          Does the patient meet one of the following criteria for ADS visit consideration? 16+ years old, with an [facility] PCP     Marcelo Schwarz RN  [facility]       TIP  Providers, please consider if this condition is appropriate for management at one of our Acute and Diagnostic Services sites.     If patient is a good candidate, please use dotphrase <dot>triageresponse and select Refer to ADS to document.      Reason for Disposition   MILD dizziness (e.g., walking normally) and has NOT been evaluated by physician for this (Exception: Dizziness caused by heat exposure, sudden standing, or poor fluid intake.)    Additional Information   Negative: SEVERE difficulty breathing (e.g., struggling for each breath, speaks in single words)   Negative: Shock suspected (e.g., cold/pale/clammy skin, too weak to stand, low BP, rapid " pulse)   Negative: Difficult to awaken or acting confused (e.g., disoriented, slurred speech)   Negative: Fainted, and still feels dizzy afterwards   Negative: Overdose (accidental or intentional) of medications   Negative: New neurologic deficit that is present now: * Weakness of the face, arm, or leg on one side of the body * Numbness of the face, arm, or leg on one side of the body * Loss of speech or garbled speech   Negative: Heart beating < 50 beats per minute OR > 140 beats per minute   Negative: Sounds like a life-threatening emergency to the triager   Negative: Chest pain   Negative: Rectal bleeding, bloody stool, or tarry-black stool   Negative: Vomiting is main symptom   Negative: Diarrhea is main symptom   Negative: Headache is main symptom   Negative: Heat exhaustion suspected (i.e., dehydration from heat exposure)   Negative: Patient states that they are having an anxiety or panic attack   Negative: Dizziness from low blood sugar (i.e., < 60 mg/dl or 3.5 mmol/l)   Negative: SEVERE dizziness (e.g., unable to stand, requires support to walk, feels like passing out now)   Negative: SEVERE headache or neck pain   Negative: Spinning or tilting sensation (vertigo) present now and one or more stroke risk factors (i.e., hypertension, diabetes mellitus, prior stroke/TIA, heart attack, age over 60) (Exception: Prior physician evaluation for this AND no different/worse than usual.)   Negative: Neurologic deficit that was brief (now gone), ANY of the following:* Weakness of the face, arm, or leg on one side of the body* Numbness of the face, arm, or leg on one side of the body* Loss of speech or garbled speech   Negative: Loss of vision or double vision  (Exception: Similar to previous migraines.)   Negative: Extra heartbeats, irregular heart beating, or heart is beating very fast (i.e., 'palpitations')   Negative: Difficulty breathing   Negative: Drinking very little and dehydration suspected (e.g., no urine > 12  "hours, very dry mouth, very lightheaded)   Negative: Follows bleeding (e.g., stomach, rectum, vagina)  (Exception: Became dizzy from sight of small amount blood.)   Negative: Patient sounds very sick or weak to the triager   Negative: Lightheadedness (dizziness) present now, after 2 hours of rest and fluids   Negative: Spinning or tilting sensation (vertigo) present now   Negative: Fever > 103 F (39.4 C)   Negative: Fever > 100.0 F (37.8 C) and has diabetes mellitus or a weak immune system (e.g., HIV positive, cancer chemotherapy, organ transplant, splenectomy, chronic steroids)   Negative: MODERATE dizziness (e.g., interferes with normal activities)  (Exception: Dizziness caused by heat exposure, sudden standing, or poor fluid intake.)   Negative: Vomiting occurs with dizziness   Negative: Patient wants to be seen   Negative: Taking a medicine that could cause dizziness (e.g., blood pressure medications, diuretics)    Answer Assessment - Initial Assessment Questions  1. DESCRIPTION: \"Describe your dizziness.\"      Feeling of nausea and dizziness, some vertigo  2. LIGHTHEADED: \"Do you feel lightheaded?\" (e.g., somewhat faint, woozy, weak upon standing)      Some lightheaded when standing up   3. VERTIGO: \"Do you feel like either you or the room is spinning or tilting?\" (i.e. vertigo)      Mild vertigo   4. SEVERITY: \"How bad is it?\"  \"Do you feel like you are going to faint?\" \"Can you stand and walk?\"    - MILD: Feels slightly dizzy, but walking normally.    - MODERATE: Feels unsteady when walking, but not falling; interferes with normal activities (e.g., school, work).    - SEVERE: Unable to walk without falling, or requires assistance to walk without falling; feels like passing out now.       Able to walk ok so mild   5. ONSET:  \"When did the dizziness begin?\"      About 4 days ago   6. AGGRAVATING FACTORS: \"Does anything make it worse?\" (e.g., standing, change in head position)      Standing or change in head " "position   7. HEART RATE: \"Can you tell me your heart rate?\" \"How many beats in 15 seconds?\"  (Note: not all patients can do this)        Not that was noticed  8. CAUSE: \"What do you think is causing the dizziness?\"      Thought it was motion sickness  9. RECURRENT SYMPTOM: \"Have you had dizziness before?\" If Yes, ask: \"When was the last time?\" \"What happened that time?\"      Not that she is aware of   10. OTHER SYMPTOMS: \"Do you have any other symptoms?\" (e.g., fever, chest pain, vomiting, diarrhea, bleeding)        diarrhea  11. PREGNANCY: \"Is there any chance you are pregnant?\" \"When was your last menstrual period?\"        N/a    Protocols used: Dizziness-A-OH    "

## 2024-07-09 ENCOUNTER — OFFICE VISIT (OUTPATIENT)
Dept: FAMILY MEDICINE | Facility: CLINIC | Age: 24
End: 2024-07-09
Payer: COMMERCIAL

## 2024-07-09 VITALS
HEART RATE: 72 BPM | HEIGHT: 64 IN | BODY MASS INDEX: 23.22 KG/M2 | RESPIRATION RATE: 12 BRPM | SYSTOLIC BLOOD PRESSURE: 109 MMHG | WEIGHT: 136 LBS | OXYGEN SATURATION: 97 % | DIASTOLIC BLOOD PRESSURE: 74 MMHG | TEMPERATURE: 97.5 F

## 2024-07-09 DIAGNOSIS — H81.11 BENIGN PAROXYSMAL POSITIONAL VERTIGO OF RIGHT EAR: Primary | ICD-10-CM

## 2024-07-09 PROCEDURE — 99213 OFFICE O/P EST LOW 20 MIN: CPT | Performed by: NURSE PRACTITIONER

## 2024-07-09 RX ORDER — HYDROXYZINE PAMOATE 25 MG/1
CAPSULE ORAL
COMMUNITY
Start: 2023-12-11

## 2024-07-09 ASSESSMENT — PATIENT HEALTH QUESTIONNAIRE - PHQ9
SUM OF ALL RESPONSES TO PHQ QUESTIONS 1-9: 4
SUM OF ALL RESPONSES TO PHQ QUESTIONS 1-9: 4
10. IF YOU CHECKED OFF ANY PROBLEMS, HOW DIFFICULT HAVE THESE PROBLEMS MADE IT FOR YOU TO DO YOUR WORK, TAKE CARE OF THINGS AT HOME, OR GET ALONG WITH OTHER PEOPLE: SOMEWHAT DIFFICULT

## 2024-07-09 ASSESSMENT — ANXIETY QUESTIONNAIRES
8. IF YOU CHECKED OFF ANY PROBLEMS, HOW DIFFICULT HAVE THESE MADE IT FOR YOU TO DO YOUR WORK, TAKE CARE OF THINGS AT HOME, OR GET ALONG WITH OTHER PEOPLE?: NOT DIFFICULT AT ALL
1. FEELING NERVOUS, ANXIOUS, OR ON EDGE: SEVERAL DAYS
7. FEELING AFRAID AS IF SOMETHING AWFUL MIGHT HAPPEN: NOT AT ALL
6. BECOMING EASILY ANNOYED OR IRRITABLE: NOT AT ALL
7. FEELING AFRAID AS IF SOMETHING AWFUL MIGHT HAPPEN: NOT AT ALL
4. TROUBLE RELAXING: NOT AT ALL
3. WORRYING TOO MUCH ABOUT DIFFERENT THINGS: NOT AT ALL
5. BEING SO RESTLESS THAT IT IS HARD TO SIT STILL: NOT AT ALL
2. NOT BEING ABLE TO STOP OR CONTROL WORRYING: NOT AT ALL
IF YOU CHECKED OFF ANY PROBLEMS ON THIS QUESTIONNAIRE, HOW DIFFICULT HAVE THESE PROBLEMS MADE IT FOR YOU TO DO YOUR WORK, TAKE CARE OF THINGS AT HOME, OR GET ALONG WITH OTHER PEOPLE: NOT DIFFICULT AT ALL
GAD7 TOTAL SCORE: 1

## 2024-07-09 ASSESSMENT — ENCOUNTER SYMPTOMS: HEADACHES: 1

## 2024-07-09 NOTE — PROGRESS NOTES
"  Assessment & Plan     Benign paroxysmal positional vertigo of right ear  **  - meclizine (ANTIVERT) 25 MG tablet  Dispense: 30 tablet; Refill: 0  - ondansetron (ZOFRAN) 4 MG tablet  Dispense: 20 tablet; Refill: 0    - leaning towards BPPV and this was discussed with patient today and AVS reviewed as well. Will try medications and take the day off tomorrow to hopefully rest and not over-exert herself. Warning signs discussed and when to see follow up.         Chuck Monzon is a 23 year old, presenting for the following health issues:  Headache and Dizziness      7/9/2024     3:25 PM   Additional Questions   Roomed by Marcelo   - francisco j migraine since Friday. She stated she is Migraine prone. Has been feeling dizziness, like car-sick. Was Sitting on jet ski this on the 4th. Prone to motion sickness. Went to [employer] too and was feeling nauseated. Room spinning with turning head. This morning, clean out ears, felt pressure sensation.   - headaches occur frontal, bilateral, behind eyes.   Dizzy with standing.   - Eating okay now. No other symptoms.     Headache   This is a new problem. The current episode started more than 1 week ago. The problem occurs constantly. The problem has been gradually worsening. The pain is at a severity of 6/10. The pain is moderate.   History of Present Illness       Headaches:   Since the patient's last clinic visit, headaches are: no change  The patient is getting headaches:  Constant  She is not able to do normal daily activities when she has a migraine.  The patient is taking the following rescue/relief medications:  Tylenol and other   Patient states \"I get some relief\" from the rescue/relief medications.   The patient is taking the following medications to prevent migraines:  No medications to prevent migraines  In the past 4 weeks, the patient has gone to an Urgent Care or Emergency Room 0 times times due to headaches.    She eats 2-3 servings of fruits and vegetables daily.She " "consumes 1 sweetened beverage(s) daily.She exercises with enough effort to increase her heart rate 20 to 29 minutes per day.  She exercises with enough effort to increase her heart rate 3 or less days per week. She is missing 1 dose(s) of medications per week.             Objective    /74   Pulse 72   Temp 97.5  F (36.4  C) (Oral)   Resp 12   Ht 1.613 m (5' 3.5\")   Wt 61.7 kg (136 lb)   SpO2 97%   BMI 23.71 kg/m    Body mass index is 23.71 kg/m .  Physical Exam   GENERAL: alert and no distress  EYES: Eyes grossly normal to inspection, PERRL and conjunctivae and sclerae normal  HENT: ear canals and TM's normal, nose and mouth without ulcers or lesions  NECK: no adenopathy, no asymmetry, masses, or scars  RESP: lungs clear to auscultation - no rales, rhonchi or wheezes  CV: regular rate and rhythm, normal S1 S2, no S3 or S4, no murmur, click or rub, no peripheral edema  MS: no gross musculoskeletal defects noted, no edema  SKIN: no suspicious lesions or rashes  NEURO: Normal strength and tone, mentation intact and speech normal. Rooming spinning noted when going from sitting to lying on right side, lasted less than one minute.  PSYCH: mentation appears normal, affect normal/bright  LYMPH: no cervical, supraclavicular, axillary, or inguinal adenopathy    Lab on 04/26/2024   Component Date Value Ref Range Status    Color Urine 04/26/2024 Yellow  Colorless, Straw, Light Yellow, Yellow Final    Appearance Urine 04/26/2024 Clear  Clear Final    Glucose Urine 04/26/2024 Negative  Negative mg/dL Final    Bilirubin Urine 04/26/2024 Negative  Negative Final    Ketones Urine 04/26/2024 Negative  Negative mg/dL Final    Specific Gravity Urine 04/26/2024 1.025  1.005 - 1.030 Final    Blood Urine 04/26/2024 Negative  Negative Final    pH Urine 04/26/2024 5.5  5.0 - 8.0 Final    Protein Albumin Urine 04/26/2024 30 (A)  Negative mg/dL Final    Urobilinogen Urine 04/26/2024 0.2  0.2, 1.0 E.U./dL Final    Nitrite Urine " 04/26/2024 Negative  Negative Final    Leukocyte Esterase Urine 04/26/2024 Negative  Negative Final    Trichomonas 04/26/2024 Absent  Absent Final    Yeast 04/26/2024 Present (A)  Absent Final    Clue Cells 04/26/2024 Present (A)  Absent Final    WBCs/high power field 04/26/2024 2+ (A)  None Final    Bacteria Urine 04/26/2024 Few (A)  None Seen /HPF Final    RBC Urine 04/26/2024 None Seen  0-2 /HPF /HPF Final    WBC Urine 04/26/2024 0-5  0-5 /HPF /HPF Final    Squamous Epithelials Urine 04/26/2024 Few (A)  None Seen /LPF Final           Signed Electronically by: KIARRA Alvarenga CNP

## 2024-07-10 RX ORDER — MECLIZINE HYDROCHLORIDE 25 MG/1
25 TABLET ORAL 3 TIMES DAILY PRN
Qty: 30 TABLET | Refills: 0 | Status: SHIPPED | OUTPATIENT
Start: 2024-07-10

## 2024-07-10 RX ORDER — ONDANSETRON 4 MG/1
4 TABLET, FILM COATED ORAL EVERY 6 HOURS PRN
Qty: 20 TABLET | Refills: 0 | Status: SHIPPED | OUTPATIENT
Start: 2024-07-10 | End: 2024-07-30

## 2025-01-19 PROBLEM — K37 APPENDICITIS: Status: RESOLVED | Noted: 2022-02-11 | Resolved: 2025-01-19

## 2025-01-19 PROBLEM — Z90.49 ACQUIRED ABSENCE OF OTHER SPECIFIED PARTS OF DIGESTIVE TRACT: Status: RESOLVED | Noted: 2022-04-12 | Resolved: 2025-01-19

## 2025-01-19 NOTE — PROGRESS NOTES
Assessment/Plan:   Na is a 24 year old female here for physical with additional concern    Routine adult health maintenance  Physical completed today.  No bloodwork indicated.  Up-to-date with immunizations.  Pap smear completed today.    Anxiety  Mild episode of recurrent major depressive disorder  Reports stable at this time, no changes.    Atypical squamous cells of undetermined significance (ASCUS) on Papanicolaou smear of cervix  Cervical cancer screening  History of abnormal Pap smear, Pap smear completed today.  - Pap Screen Only - Recommended Age 21 - 24 Years    Tension-type headache, not intractable, unspecified chronicity pattern  Patient request refill of headache medication which was provided today.  - butalbital-acetaminophen-caffeine (ESGIC) -40 MG tablet  Dispense: 30 tablet; Refill: 1    Skin mole  Patient reports abnormal mole on left upper arm, she also has numerous moles and freckles, referral to dermatology placed for evaluation/skin check.  - Adult Dermatology  Referral    Other secondary neuroendocrine tumors (H)  Follows with specialist at [place], no concerns at this time.       I have had an Advance Directives discussion with the patient.    Follow up: 1 year for physical, sooner as needed    Frances Don MD  [facility]      Subjective:     Na De León is a 24 year old female who presents for an annual exam.     Question 1/22/2025  9:33 AM CST - Filed by Patient   The following questionnaire should only be answered by the patient. Are you the patient? Yes        Over the last 2 weeks, how often have you been bothered by any of the following problems?    1. Little interest or pleasure in doing things Not at all   2.  Feeling down, depressed, or hopeless Not at all   3.  Trouble falling or staying asleep, or sleeping too much Not at all   4.  Feeling tired or having little energy Not at all   5.  Poor appetite or overeating Not at all   6.  Feeling  bad about yourself - or that you are a failure or have let yourself or your family down Not at all   7.  Trouble concentrating on things, such as reading the newspaper or watching television Not at all   8.  Moving or speaking so slowly that other people could have noticed. Or the opposite - being so fidgety or restless that you have been moving around a lot more than usual Not at all   9.  Thoughts that you would be better off dead, or of hurting yourself in some way Not at all   If you checked off any problems, how difficult have these problems made it for you to do your work, take care of things at home, or get along with other people? Not difficult at all        PHQ9 TOTAL SCORE (range: 0 - 27) 0     Question 1/22/2025  9:35 AM CST - Filed by Patient   In general, how would you rate your overall physical health? Good   Do you get at least 3 servings of foods that have calcium each day (dairy, green leafy vegetables, etc)? (!) NO   Do you have a special diet? Regular (no restrictions)   How many servings of fruits and vegetables do you eat daily? (!) 2-3   On average, how many sweetened beverages do you drink each day (Examples: soda, juice, sweet tea, etc.)? Do NOT count diet or artificially sweetened beverages. 0-1   On average, how many days per week do you engage in moderate to strenuous exercise (like a brisk walk)? 3 days   On average, how many minutes do you engage in exercise at this level? 40 min   How often do you get together with friends or relatives? Twice a week   Do you see a dentist two times every year? Yes   Do you feel stress - tense, restless, nervous, or anxious, or unable to sleep at night because your mind is troubled all the time - these days? Not at all   If you drink alcohol, do you typically have more than 3 drinks per day OR more than 7 drinks per week? No   Do you use any substances not prescribed by a provider, out of habit or for other reasons? No   Have you had any new sexual  partners since you were last tested for sexually transmitted infections, including HIV? (!) YES    Do you have any questions about contraception (birth control) or family planning? No   Within the past 12 months, did you worry that your food would run out before you got money to buy more? No   Within the past 12 months, did the food you bought just not last and you didn t have money to get more? No   Do you have housing? (Housing is defined as stable permanent housing and does not include staying ouside in a car, in a tent, in an abandoned building, in an overnight shelter, or couch-surfing.) No   Are you worried about losing your housing? No   Within the past 12 months, has lack of transportation kept you from medical appointments, getting your medicines, non-medical meetings or appointments, work, or from getting things that you need? No   Within the past 12 months, have you or your family members you live with been unable to get utilities (heat, electricity) when it was really needed? No   Would you like to be contacted by a care coordinator to help with housing and/or utility needs? No     Hx migraines -improved over time, uses esgic for this  Mood good   On spironolactone for acne  [place] twice per year for oncology follow-up   L arm mole that has been bothering her, not changing    Health Maintenance reviewed:  Lipid Profile: no  Glucose Screen: no  Colonoscopy: no  Mammogram: no    Gynecologic History  Patient's last menstrual period was 12/30/2024 (approximate).  Contraception: Nexplanon - thinking about having this removed later in the year and changing to OCPs  Last Pap: 2023. Results were: LSIL - due now    Immunization History   Administered Date(s) Administered    COVID-19 12+ (Pfizer) 12/08/2023    COVID-19 MONOVALENT 12+ (Pfizer) 03/30/2021, 04/22/2021, 12/21/2021    DTAP (<7y) 10/11/2005    DTaP, Unspecified 2000, 01/29/2001, 04/18/2001, 01/29/2002, 10/11/2005    Flu, Unspecified 11/15/2008,  12/07/2009, 11/12/2012, 10/09/2019, 09/29/2020    Flu-nasal, Unspecified 11/14/2011    HIB, Unspecified 2000, 01/29/2001, 12/18/2001    HPV Quadrivalent 08/07/2015    HPV9 10/19/2015, 03/31/2016    HepA, Unspecified 12/07/2009, 11/14/2011    HepB, Unspecified 2000, 01/29/2001, 12/18/2001    Hepatitis A (ADULT 19+) 12/07/2009, 11/14/2011    Influenza (H1N1) 11/21/2009    Influenza (IIV3) PF 10/11/2005, 11/17/2005, 02/17/2007, 11/15/2008, 11/12/2012, 10/21/2014    Influenza (prior to 2024) 12/07/2009    Influenza Intranasal Vaccine 11/14/2011    Influenza Vaccine >6 months,quad, PF 12/27/2013, 12/27/2013, 10/18/2021, 12/12/2022, 12/08/2023    Influenza Vaccine IM Ages 6-35 Months 4 Valent (PF) 09/29/2020    Influenza Vaccine, 6+MO IM (QUADRIVALENT W/PRESERVATIVES) 10/21/2014, 10/19/2015, 11/09/2018    Influenza, Split Virus, Trivalent, Pf (Fluzone\Fluarix) 11/12/2024    Influenza,INJ,MDCK,PF,Quad >6mo(Flucelvax) 09/29/2020    MMR 10/11/2001, 10/11/2005    Meningococcal ACWY (Menactra ) 11/14/2011, 06/30/2017    Meningococcal B (Bexsero ) 02/01/2019, 03/21/2019    Pneumococcal (PCV 7) 2000, 01/29/2001, 06/26/2001    Poliovirus, inactivated (IPV) 2000, 01/29/2001, 03/20/2001, 10/11/2005    TDAP (Adacel,Boostrix) 11/14/2011, 03/02/2022    Varicella 11/26/2001, 12/07/2009     Immunization status: up to date and documented.    Current Outpatient Medications   Medication Sig Dispense Refill    buPROPion (WELLBUTRIN XL) 150 MG 24 hr tablet Take 150 mg by mouth every morning      butalbital-acetaminophen-caffeine (ESGIC) -40 MG tablet Take 1 tablet by mouth every 6 hours as needed for headaches. 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 Indocyanine green (ICG) angiography.;  Surgeon: Jacinto Campbell MD;  Location: [facility] OR    DENTAL SURGERY      wisdom teeth    GI SURGERY  Right Hemicolectomy     Grass extracts [gramineae pollens], Onabotulinumtoxina, and Other environmental allergy  Family History   Problem Relation Age of Onset    Basal cell carcinoma Mother     Anxiety Disorder Mother     Hyperlipidemia Father     Prostate Cancer Father     Anxiety Disorder Sister     Anxiety Disorder Sister     No Known Problems Sister     Chronic Obstructive Pulmonary Disease Maternal Grandmother     Rheumatoid Arthritis Maternal Grandmother     Hypertension Maternal Grandmother     Depression Maternal Grandfather     Alzheimer Disease Maternal Grandfather     Prostate Cancer Maternal Grandfather     Pacemaker Paternal Grandmother     Prostate Cancer Paternal Grandfather     Deep Vein Thrombosis (DVT) No family hx of      Social History     Socioeconomic History    Marital status: Single     Spouse name: Not on file    Number of children: Not on file    Years of education: Not on file    Highest education level: Not on file   Occupational History    Not on file   Tobacco Use    Smoking status: Never    Smokeless tobacco: Never   Vaping Use    Vaping status: Never Used   Substance and Sexual Activity    Alcohol use: Yes    Drug use: No    Sexual activity: Yes     Partners: Male     Birth control/protection: Implant   Other Topics Concern    Parent/sibling w/ CABG, MI or angioplasty before 65F 55M? No   Social History Narrative    Lives at home with mother, father and 3 sisters.     Social Drivers of Health     Financial Resource Strain: Low Risk   (1/22/2025)    Financial Resource Strain     Within the past 12 months, have you or your family members you live with been unable to get utilities (heat, electricity) when it was really needed?: No   Food Insecurity: Low Risk  (1/22/2025)    Food Insecurity     Within the past 12 months, did you worry that your food would run out before you got money to buy more?: No     Within the past 12 months, did the food you bought just not last and you didn t have money to get more?: No   Transportation Needs: Low Risk  (1/22/2025)    Transportation Needs     Within the past 12 months, has lack of transportation kept you from medical appointments, getting your medicines, non-medical meetings or appointments, work, or from getting things that you need?: No   Physical Activity: Insufficiently Active (1/22/2025)    Exercise Vital Sign     Days of Exercise per Week: 3 days     Minutes of Exercise per Session: 40 min   Stress: No Stress Concern Present (1/22/2025)    [employer] - Occupational Stress Questionnaire     Feeling of Stress : Not at all   Social Connections: Unknown (1/22/2025)    Social Connection and Isolation Panel [NHANES]     Frequency of Communication with Friends and Family: Not on file     Frequency of Social Gatherings with Friends and Family: Twice a week     Attends Jewish Services: Not on file     Active Member of Clubs or Organizations: Not on file     Attends Club or Organization Meetings: Not on file     Marital Status: Not on file   Interpersonal Safety: Low Risk  (1/23/2025)    Interpersonal Safety     Do you feel physically and emotionally safe where you currently live?: Yes     Within the past 12 months, have you been hit, slapped, kicked or otherwise physically hurt by someone?: No     Within the past 12 months, have you been humiliated or emotionally abused in other ways by your partner or ex-partner?: No   Housing Stability: High Risk (1/22/2025)    Housing  "Stability     Do you have housing? : No     Are you worried about losing your housing?: No       Review of Systems negative unless noted    Objective:        Vitals:    01/23/25 0829   BP: 94/64   Pulse: 78   Resp: 16   Temp: 98  F (36.7  C)   TempSrc: Temporal   SpO2: 98%   Weight: 59.6 kg (131 lb 6.4 oz)   Height: 1.613 m (5' 3.5\")     Body mass index is 22.91 kg/m .    Physical Exam:  General Appearance: Alert, pleasant, appears stated age  Head: Normocephalic, without obvious abnormality  Eyes: PERRL, conjunctiva/corneas clear, EOM's intact  Ears: Normal TM's and external ear canals, both ears  Nose: Nares normal, septum midline,mucosa normal, no drainage  Throat: Lips, mucosa, and tongue normal; teeth and gums normal; oropharynx is clear  Neck: Supple,without lymphadenopathy, no thyromegaly or nodules noted  Lungs: Clear to auscultation bilaterally, respirations unlabored, no wheezing or crackles  Heart: Regular rate and rhythm, no murmur   Breast:  Normal appearance.  No palpable masses, nipple discharge, or skin changes  Abdomen: Soft, non-tender, no masses, no organomegaly  Extremities: Extremities with strong and symmetric pulses, no cyanosis or edema  Skin: Numerous freckles and moles, somewhat larger raised mole on left upper arm  Neurologic: Grossly normal, no focal deficits  Pelvic exam: Normal external genitalia, normal-appearing cervix, mild white discharge    "

## 2025-01-22 SDOH — HEALTH STABILITY: PHYSICAL HEALTH: ON AVERAGE, HOW MANY MINUTES DO YOU ENGAGE IN EXERCISE AT THIS LEVEL?: 40 MIN

## 2025-01-22 SDOH — HEALTH STABILITY: PHYSICAL HEALTH: ON AVERAGE, HOW MANY DAYS PER WEEK DO YOU ENGAGE IN MODERATE TO STRENUOUS EXERCISE (LIKE A BRISK WALK)?: 3 DAYS

## 2025-01-22 ASSESSMENT — SOCIAL DETERMINANTS OF HEALTH (SDOH): HOW OFTEN DO YOU GET TOGETHER WITH FRIENDS OR RELATIVES?: TWICE A WEEK

## 2025-01-22 ASSESSMENT — PATIENT HEALTH QUESTIONNAIRE - PHQ9
10. IF YOU CHECKED OFF ANY PROBLEMS, HOW DIFFICULT HAVE THESE PROBLEMS MADE IT FOR YOU TO DO YOUR WORK, TAKE CARE OF THINGS AT HOME, OR GET ALONG WITH OTHER PEOPLE: NOT DIFFICULT AT ALL
SUM OF ALL RESPONSES TO PHQ QUESTIONS 1-9: 0
SUM OF ALL RESPONSES TO PHQ QUESTIONS 1-9: 0

## 2025-01-23 ENCOUNTER — OFFICE VISIT (OUTPATIENT)
Dept: FAMILY MEDICINE | Facility: CLINIC | Age: 25
End: 2025-01-23
Payer: COMMERCIAL

## 2025-01-23 VITALS
HEIGHT: 64 IN | HEART RATE: 78 BPM | TEMPERATURE: 98 F | RESPIRATION RATE: 16 BRPM | WEIGHT: 131.4 LBS | DIASTOLIC BLOOD PRESSURE: 64 MMHG | SYSTOLIC BLOOD PRESSURE: 94 MMHG | BODY MASS INDEX: 22.43 KG/M2 | OXYGEN SATURATION: 98 %

## 2025-01-23 DIAGNOSIS — G44.209 TENSION-TYPE HEADACHE, NOT INTRACTABLE, UNSPECIFIED CHRONICITY PATTERN: ICD-10-CM

## 2025-01-23 DIAGNOSIS — Z00.00 ROUTINE ADULT HEALTH MAINTENANCE: Primary | ICD-10-CM

## 2025-01-23 DIAGNOSIS — F41.9 ANXIETY: ICD-10-CM

## 2025-01-23 DIAGNOSIS — Z12.4 CERVICAL CANCER SCREENING: ICD-10-CM

## 2025-01-23 DIAGNOSIS — D22.9 SKIN MOLE: ICD-10-CM

## 2025-01-23 DIAGNOSIS — C7B.8 OTHER SECONDARY NEUROENDOCRINE TUMORS (H): ICD-10-CM

## 2025-01-23 DIAGNOSIS — F33.0 MILD EPISODE OF RECURRENT MAJOR DEPRESSIVE DISORDER: ICD-10-CM

## 2025-01-23 DIAGNOSIS — R87.610 ATYPICAL SQUAMOUS CELLS OF UNDETERMINED SIGNIFICANCE (ASCUS) ON PAPANICOLAOU SMEAR OF CERVIX: ICD-10-CM

## 2025-01-23 PROBLEM — F33.1 MODERATE EPISODE OF RECURRENT MAJOR DEPRESSIVE DISORDER (H): Status: ACTIVE | Noted: 2025-01-23

## 2025-01-23 RX ORDER — BUTALBITAL, ACETAMINOPHEN AND CAFFEINE 50; 325; 40 MG/1; MG/1; MG/1
1 TABLET ORAL EVERY 6 HOURS PRN
Qty: 30 TABLET | Refills: 1 | Status: SHIPPED | OUTPATIENT
Start: 2025-01-23

## 2025-01-28 LAB
BKR LAB AP GYN ADEQUACY: ABNORMAL
BKR LAB AP GYN INTERPRETATION: ABNORMAL
BKR LAB AP HPV REFLEX: NO
BKR LAB AP LMP: ABNORMAL
BKR LAB AP PREVIOUS ABNL DX: ABNORMAL
BKR LAB AP PREVIOUS ABNORMAL: ABNORMAL
PATH REPORT.COMMENTS IMP SPEC: ABNORMAL
PATH REPORT.COMMENTS IMP SPEC: ABNORMAL
PATH REPORT.RELEVANT HX SPEC: ABNORMAL

## 2025-01-29 ENCOUNTER — TELEPHONE (OUTPATIENT)
Dept: FAMILY MEDICINE | Facility: CLINIC | Age: 25
End: 2025-01-29
Payer: COMMERCIAL

## 2025-01-29 ENCOUNTER — PATIENT OUTREACH (OUTPATIENT)
Dept: FAMILY MEDICINE | Facility: CLINIC | Age: 25
End: 2025-01-29
Payer: COMMERCIAL

## 2025-01-29 PROBLEM — R87.610 ATYPICAL SQUAMOUS CELLS OF UNDETERMINED SIGNIFICANCE (ASCUS) ON PAPANICOLAOU SMEAR OF CERVIX: Status: ACTIVE | Noted: 2022-08-18

## 2025-01-29 NOTE — TELEPHONE ENCOUNTER
Please call patient to assist her in scheduling a colposcopy with Dr. Don prior to 04/23/25.      Patient has been advised of pap results and recommendations for follow up.        Thank you,   Violeta Wu, Cervical Cancer Resulting-RN.

## 2025-03-11 ENCOUNTER — HOSPITAL ENCOUNTER (EMERGENCY)
Facility: CLINIC | Age: 25
Discharge: HOME | End: 2025-03-11
Attending: EMERGENCY MEDICINE | Admitting: EMERGENCY MEDICINE
Payer: COMMERCIAL

## 2025-03-11 VITALS
OXYGEN SATURATION: 100 % | TEMPERATURE: 98.6 F | HEIGHT: 63 IN | WEIGHT: 130 LBS | SYSTOLIC BLOOD PRESSURE: 122 MMHG | DIASTOLIC BLOOD PRESSURE: 84 MMHG | HEART RATE: 69 BPM | RESPIRATION RATE: 16 BRPM | BODY MASS INDEX: 23.04 KG/M2

## 2025-03-11 DIAGNOSIS — F41.9 ANXIETY: ICD-10-CM

## 2025-03-11 DIAGNOSIS — R45.851 SUICIDAL IDEATION: ICD-10-CM

## 2025-03-11 PROCEDURE — 99283 EMERGENCY DEPT VISIT LOW MDM: CPT

## 2025-03-11 RX ORDER — LORAZEPAM 0.5 MG/1
0.5 TABLET ORAL EVERY 6 HOURS PRN
Qty: 6 TABLET | Refills: 0 | Status: SHIPPED | OUTPATIENT
Start: 2025-03-11

## 2025-03-11 ASSESSMENT — COLUMBIA-SUICIDE SEVERITY RATING SCALE - C-SSRS
1. IN THE PAST MONTH, HAVE YOU WISHED YOU WERE DEAD OR WISHED YOU COULD GO TO SLEEP AND NOT WAKE UP?: YES
4. HAVE YOU HAD THESE THOUGHTS AND HAD SOME INTENTION OF ACTING ON THEM?: NO
2. HAVE YOU ACTUALLY HAD ANY THOUGHTS OF KILLING YOURSELF IN THE PAST MONTH?: YES
5. HAVE YOU STARTED TO WORK OUT OR WORKED OUT THE DETAILS OF HOW TO KILL YOURSELF? DO YOU INTEND TO CARRY OUT THIS PLAN?: YES
3. HAVE YOU BEEN THINKING ABOUT HOW YOU MIGHT KILL YOURSELF?: YES
6. HAVE YOU EVER DONE ANYTHING, STARTED TO DO ANYTHING, OR PREPARED TO DO ANYTHING TO END YOUR LIFE?: YES

## 2025-03-11 ASSESSMENT — ACTIVITIES OF DAILY LIVING (ADL)
ADLS_ACUITY_SCORE: 50

## 2025-03-11 NOTE — ED TRIAGE NOTES
Pt arrives to ED with co suicidal thoughts with plan for the past few weeks. Dx with anxiety and depression. Pt has not been taking her psych medications for the past 3 months. Plan is to overdose on the pills she has at home. Never attempted suicide. Has been hospitalized in the past for the same thing. See her therapist once a month.      Triage Assessment (Adult)       Row Name 03/11/25 7519          Triage Assessment    Airway WDL WDL        Respiratory WDL    Respiratory WDL WDL        Skin Circulation/Temperature WDL    Skin Circulation/Temperature WDL WDL        Cardiac WDL    Cardiac WDL WDL        Peripheral/Neurovascular WDL    Peripheral Neurovascular WDL WDL        Cognitive/Neuro/Behavioral WDL    Cognitive/Neuro/Behavioral WDL WDL

## 2025-03-12 ENCOUNTER — HOSPITAL ENCOUNTER (EMERGENCY)
Facility: CLINIC | Age: 25
Discharge: HOME | End: 2025-03-13
Attending: EMERGENCY MEDICINE
Payer: COMMERCIAL

## 2025-03-12 DIAGNOSIS — R45.851 SUICIDAL IDEATION: ICD-10-CM

## 2025-03-12 DIAGNOSIS — F41.1 GENERALIZED ANXIETY DISORDER: Primary | ICD-10-CM

## 2025-03-12 LAB
ALBUMIN SERPL BCG-MCNC: 4.2 G/DL (ref 3.5–5.2)
ALP SERPL-CCNC: 31 U/L (ref 40–150)
ALT SERPL W P-5'-P-CCNC: 9 U/L (ref 0–50)
ANION GAP SERPL CALCULATED.3IONS-SCNC: 16 MMOL/L (ref 7–15)
APAP SERPL-MCNC: <5 UG/ML (ref 10–30)
AST SERPL W P-5'-P-CCNC: 19 U/L (ref 0–45)
BASOPHILS # BLD AUTO: 0 10E3/UL (ref 0–0.2)
BASOPHILS NFR BLD AUTO: 1 %
BILIRUB SERPL-MCNC: 0.3 MG/DL
BUN SERPL-MCNC: 12.6 MG/DL (ref 6–20)
CALCIUM SERPL-MCNC: 9.6 MG/DL (ref 8.8–10.4)
CHLORIDE SERPL-SCNC: 105 MMOL/L (ref 98–107)
CREAT SERPL-MCNC: 0.82 MG/DL (ref 0.51–0.95)
EGFRCR SERPLBLD CKD-EPI 2021: >90 ML/MIN/1.73M2
EOSINOPHIL # BLD AUTO: 0.1 10E3/UL (ref 0–0.7)
EOSINOPHIL NFR BLD AUTO: 1 %
ERYTHROCYTE [DISTWIDTH] IN BLOOD BY AUTOMATED COUNT: 11.7 % (ref 10–15)
ETHANOL SERPL-MCNC: <0.01 G/DL
GLUCOSE SERPL-MCNC: 99 MG/DL (ref 70–99)
HCG SERPL QL: NEGATIVE
HCO3 SERPL-SCNC: 18 MMOL/L (ref 22–29)
HCT VFR BLD AUTO: 39.1 % (ref 35–47)
HGB BLD-MCNC: 13.4 G/DL (ref 11.7–15.7)
IMM GRANULOCYTES # BLD: 0 10E3/UL
IMM GRANULOCYTES NFR BLD: 0 %
LYMPHOCYTES # BLD AUTO: 2.4 10E3/UL (ref 0.8–5.3)
LYMPHOCYTES NFR BLD AUTO: 41 %
MCH RBC QN AUTO: 30.6 PG (ref 26.5–33)
MCHC RBC AUTO-ENTMCNC: 34.3 G/DL (ref 31.5–36.5)
MCV RBC AUTO: 89 FL (ref 78–100)
MONOCYTES # BLD AUTO: 0.6 10E3/UL (ref 0–1.3)
MONOCYTES NFR BLD AUTO: 11 %
NEUTROPHILS # BLD AUTO: 2.7 10E3/UL (ref 1.6–8.3)
NEUTROPHILS NFR BLD AUTO: 46 %
NRBC # BLD AUTO: 0 10E3/UL
NRBC BLD AUTO-RTO: 0 /100
PLATELET # BLD AUTO: 267 10E3/UL (ref 150–450)
POTASSIUM SERPL-SCNC: 3.6 MMOL/L (ref 3.4–5.3)
PROT SERPL-MCNC: 6.4 G/DL (ref 6.4–8.3)
RBC # BLD AUTO: 4.38 10E6/UL (ref 3.8–5.2)
SODIUM SERPL-SCNC: 139 MMOL/L (ref 135–145)
WBC # BLD AUTO: 5.8 10E3/UL (ref 4–11)

## 2025-03-12 PROCEDURE — 80053 COMPREHEN METABOLIC PANEL: CPT | Performed by: EMERGENCY MEDICINE

## 2025-03-12 PROCEDURE — 84703 CHORIONIC GONADOTROPIN ASSAY: CPT | Performed by: EMERGENCY MEDICINE

## 2025-03-12 PROCEDURE — 85041 AUTOMATED RBC COUNT: CPT | Performed by: EMERGENCY MEDICINE

## 2025-03-12 PROCEDURE — 36415 COLL VENOUS BLD VENIPUNCTURE: CPT | Performed by: EMERGENCY MEDICINE

## 2025-03-12 PROCEDURE — 80048 BASIC METABOLIC PNL TOTAL CA: CPT | Performed by: EMERGENCY MEDICINE

## 2025-03-12 PROCEDURE — 99285 EMERGENCY DEPT VISIT HI MDM: CPT

## 2025-03-12 PROCEDURE — 82077 ASSAY SPEC XCP UR&BREATH IA: CPT | Performed by: EMERGENCY MEDICINE

## 2025-03-12 PROCEDURE — 80143 DRUG ASSAY ACETAMINOPHEN: CPT | Performed by: EMERGENCY MEDICINE

## 2025-03-12 PROCEDURE — 85004 AUTOMATED DIFF WBC COUNT: CPT | Performed by: EMERGENCY MEDICINE

## 2025-03-12 ASSESSMENT — COLUMBIA-SUICIDE SEVERITY RATING SCALE - C-SSRS
2. HAVE YOU ACTUALLY HAD ANY THOUGHTS OF KILLING YOURSELF IN THE PAST MONTH?: YES
6. HAVE YOU EVER DONE ANYTHING, STARTED TO DO ANYTHING, OR PREPARED TO DO ANYTHING TO END YOUR LIFE?: NO
4. HAVE YOU HAD THESE THOUGHTS AND HAD SOME INTENTION OF ACTING ON THEM?: NO
3. HAVE YOU BEEN THINKING ABOUT HOW YOU MIGHT KILL YOURSELF?: YES
5. HAVE YOU STARTED TO WORK OUT OR WORKED OUT THE DETAILS OF HOW TO KILL YOURSELF? DO YOU INTEND TO CARRY OUT THIS PLAN?: YES
1. IN THE PAST MONTH, HAVE YOU WISHED YOU WERE DEAD OR WISHED YOU COULD GO TO SLEEP AND NOT WAKE UP?: YES

## 2025-03-12 ASSESSMENT — ACTIVITIES OF DAILY LIVING (ADL): ADLS_ACUITY_SCORE: 50

## 2025-03-12 NOTE — CONSULTS
"Diagnostic Evaluation Consultation  Crisis Assessment    Patient Name: Na De León  Age:  24 year old  Legal Sex: female  Gender Identity: female  Pronouns:   Race: White  Ethnicity: Not  or   Language: English      Patient was assessed: Virtual: [facility]   Crisis Assessment Start Date: 03/11/25  Crisis Assessment Start Time: 1956  Crisis Assessment Stop Time: 2031  Patient location: [facility] Emergency Room                                 Referral Data and Chief Complaint  Na De León presents to the ED with family/friends. Patient is presenting to the ED for the following concerns: Suicidal ideation, Depression, Worsening psychosocial stress. Factors that make the mental health crisis life threatening or complex are: Pt arrives to ED with her mother for concerns of suicidal thoughts with plan for the past few weeks. Plan is to overdose on the pills she has at home. Pt identifies stressors of job stress, family stress, health bills, and break up with boyfriend three months ago. She reports this is a similar formula for when she previously presented to the ED for mental health concerns in 2022. She reports that she tends to feel things more deeply than others around her and so it's more difficult for her to move on from adverse situations. Pt reports her SI is a 3/5, and although she has thought of a plan, she isn't sure if she has intent. She reports she mainly has these thoughts at night when she is alone, but is able to distract herself during the day. During assessment, pt appeared somewhat irritable with mom and . She reported that she has to be to work tomorrow morning and asked if there was a pill she could have to \"make this better tonight\". She then expresses frustration that there is no \"immediate fix\", and then her mother endorsed frustration that pt stopped her medications.  Pt denies SIB, HI, changes in sleep or energy, or any signs of " psychosis or bel..      Informed Consent and Assessment Methods  Explained the crisis assessment process, including applicable information disclosures and limits to confidentiality, assessed understanding of the process, and obtained consent to proceed with the assessment.  Assessment methods included conducting a formal interview with patient, review of medical records, collaboration with medical staff, and obtaining relevant collateral information from family and community providers when available.  : done     History of the Crisis   Pt has historical diagnosis of anxiety and depression. Has presented to ED for similar complaints before (most recent was 12/7/22), and was discharged with outpatient appointments scheduled. No history of IP  hospitalizations. No hx of suicide attempts. Sees therapist once a month.  Has psychiatrist, but has not been taking medications for over three months ago due to not feeling like she didn't need it anymore.    Brief Psychosocial History  Family:  Single, Children no  Support System:  Friend, Parent(s)  Employment Status:  employed full-time  Source of Income:  salary/wages  Financial Environmental Concerns:  insurance inadequate, unable to afford medication(s)  Current Hobbies:  social media/computer activities, interaction with pets, group/social activities, television/movies/videos, family functions  Barriers in Personal Life:  mental health concerns    Significant Clinical History  Current Anxiety Symptoms:  racing thoughts, shortness of breath or racing heart, anxious  Current Depression/Trauma:  thoughts of death/suicide, crying or feels like crying, sadness  Current Somatic Symptoms:  racing thoughts, anxious  Current Psychosis/Thought Disturbance:  forgetful  Current Eating Symptoms:  loss of appetite  Chemical Use History:      Past diagnosis:  Anxiety Disorder, Depression  Family history:  No known history of mental health or chemical health concerns  Past treatment:   Individual therapy, Psychiatric Medication Management  Details of most recent treatment:  reports she saw her therapist earlier this month (sees once per month).  Other relevant history:  Lives with two roomates and her dog. Biological parents are together, and pt has three sisters. Medical history signfigant for cancer. No legal history.    Have there been any medication changes in the past two weeks:  no       Is the patient compliant with medications:  no        Collateral Information  Is there collateral information: Yes     Collateral information name, relationship, phone number:  Maggie De León (Mother) [phone]    What happened today: This morning pt reported mental health concerns to her sister and then the sister called the mother and updated her that (the pt) was really struggling. Maggie then called pt and convinced her to come to ED     What is different about patient's functioning: She seemed more stable and less emotional while on meds. She did not tell any of us that she stopped her medications, but we noticed after a few weeks. She needs to get back on medications.     What do you think the patient needs:      Has patient made comments about wanting to kill themselves/others: yes    If d/c is recommended, can they take part in safety/aftercare planning:  yes (Maggie confirmed that pt will come stay with her for a few days while she gets started on her medications, and Maggie will give out the doses)    Additional collateral information:        Risk Assessment  [place] Suicide Severity Rating Scale Full Clinical Version:  Suicidal Ideation  Q1 Wish to be Dead (Lifetime): Yes  Q2 Non-Specific Active Suicidal Thoughts (Lifetime): No  3. Active Suicidal Ideation with any Methods (Not Plan) Without Intent to Act (Lifetime): Yes  4. Active Suicidal Ideation with Some Intent to Act, Without Specific Plan (Lifetime): Yes  5. Active Suicidal Ideation with Specific Plan and Intent (Lifetime): No  Q6 Suicide Behavior  (Lifetime): yes  Intensity of Ideation (Lifetime)  Most Severe Ideation Rating (Lifetime): 3  Frequency (Lifetime): Daily or almost daily  Duration (Lifetime): Less than 1 hour/some of the time  Controllability (Lifetime): Can control thoughts with some difficulty       [place] Suicide Severity Rating Scale Recent:   Suicidal Ideation (Recent)  Q1 Wished to be Dead (Past Month): yes  Q2 Suicidal Thoughts (Past Month): yes  Q3 Suicidal Thought Method: yes  Q4 Suicidal Intent without Specific Plan: yes  Q5 Suicide Intent with Specific Plan: yes  If yes to Q6, within past 3 months?: yes  Level of Risk per Screen: high risk  Intensity of Ideation (Recent)  Most Severe Ideation Rating (Past 1 Month): 3  Frequency (Past 1 Month): Daily or almost daily  Duration (Past 1 Month): Less than 1 hour/some of the time  Controllability (Past 1 Month): Can control thoughts with some difficulty  Deterrents (Past 1 Month): Deterrents definitely stopped you from attempting suicide  Reasons for Ideation (Past 1 Month): Completely to end or stop the pain (You couldn't go on living with the pain or how you were feeling)  Suicidal Behavior (Recent)  Actual Attempt (Past 3 Months): No  Has subject engaged in non-suicidal self-injurious behavior? (Past 3 Months): No  Interrupted Attempts (Past 3 Months): No  Aborted or Self-Interrupted Attempt (Past 3 Months): No  Preparatory Acts or Behavior (Past 3 Months): No    Environmental or Psychosocial Events: challenging interpersonal relationships, loss of a relationship due to divorce/separation  Protective Factors: Protective Factors: strong bond to family unit, community support, or employment, responsibilities and duties to others, including pets and children, lives in a responsibly safe and stable environment, supportive ongoing medical and mental health care relationships, sense of importance of health and wellness    Does the patient have thoughts of harming others? Feels Like Hurting  Others: no  Previous Attempt to Hurt Others: no  Is the patient engaging in sexually inappropriate behavior?: no  Does Patient have a known history of aggressive behavior: No    Is the patient engaging in sexually inappropriate behavior?  no        Mental Status Exam   Affect: Appropriate  Appearance: Appropriate  Attention Span/Concentration: Attentive  Eye Contact: Engaged    Fund of Knowledge: Appropriate   Language /Speech Content: Fluent  Language /Speech Volume: Normal  Language /Speech Rate/Productions: Normal  Recent Memory: Intact  Remote Memory: Intact  Mood: Sad, Anxious  Orientation to Person: Yes   Orientation to Place: Yes  Orientation to Time of Day: Yes  Orientation to Date: Yes     Situation (Do they understand why they are here?): Yes  Psychomotor Behavior: Normal  Thought Content: Clear  Thought Form: Intact     Mini-Cog Assessment  Number of Words Recalled:    Clock-Drawing Test:     Three Item Recall:    Mini-Cog Total Score:       Medication  Psychotropic medications:   Medication Orders - Psychiatric (From admission, onward)      Start     Dose/Rate Route Frequency Ordered Stop    03/11/25 0000  LORazepam (ATIVAN) 0.5 MG tablet         0.5 mg Oral EVERY 6 HOURS PRN 03/11/25 2044               Current Care Team  Patient Care Team:  Frances Don MD as PCP - General (Family Medicine)  Evan Pacheco, RN as Specialty Care Coordinator (Hematology & Oncology)  Ajay Montilla MD as Resident (Student in organized health care education/training program)  Frances Don MD as Assigned PCP  Teena Mcdermott, PhD LP as Psychologist (Neuropsychology)  Teena Mcdermott, PhD LP as Assigned Behavioral Health Provider    Diagnosis  Patient Active Problem List   Diagnosis Code    Concussion without loss of consciousness S06.0X0A    Migraine without aura and without status migrainosus, not intractable G43.009    Anxiety F41.9    S/P right hemicolectomy Z90.49    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    Atypical squamous cells of undetermined significance (ASCUS) on Papanicolaou smear of cervix R87.610    Nexplanon in place Z97.5    Anal fissure K60.2    Other secondary neuroendocrine tumors (H) C7B.8    Mild episode of recurrent major depressive disorder F33.0       Primary Problem This Admission  Active Hospital Problems    *Mild episode of recurrent major depressive disorder        Clinical Summary and Substantiation of Recommendations   Clinical Substantiation:  After therapeutic assessment, intervention and aftercare planning by ED care team and [facility] and in consultation with attending provider, the patient's circumstances and mental state were appropriate for outpatient management. At this time the pt is not presenting as an acute risk to self or others due to the following factors: Although pt endorses SI with plan, pt denies current intent and was able to engage in safety planning to limit access to her means. Pt identified multiple protective factors including good impulse control, a good support network of friends, future goal orientation, and was agreeable to restarting her psychotropic medications, scheduling follow up with her psychiatrist tomorrow morning, and increasing her individual therapy to twice a month to process her identified stressors. Pt has safe environment to discharge to, as she was in agreement with her mother's offer for pt to move in with her parents for a few days while she begins medications and stabilizes. Pt's mother will also control access to her medications during this period.  Pt and collateral was in agreement with disposition, and ED provider was amenable to sending pt home with limited supply of anti-anxiety medications in case she has a waiting period of getting into her psychiatrist.    Goals for crisis stabilization:  n/a    Next steps for Care Team:  n/a    Treatment Objectives Addressed:   rapport building, orienting the patient to therapy, processing feelings, assessing safety    Therapeutic Interventions:  Engaged in safety planning, Explored and identified early warning signs to SI.    Has a specific means been identified for suicidal/homicide actions: Yes    If yes, describe:  overdose on pills    Explain action steps toward mitigation:  collaborated with collateral to create and implement safety plan    Document completion of mitigation actions:  safety plan completed: pt will be staying with her parents for several days and her mother will controla ccess to pills    The follow up action still needed prior to discharge:  n/a    Patient coping skills attempted to reduce the crisis:  help seeking    Disposition  Recommended referrals: Individual Therapy, Medication Management        Reviewed case and recommendations with attending provider. Attending Name: Dr. Ring       Attending concurs with disposition: yes       Patient and/or validated legal guardian concurs with disposition:   yes       Final disposition:  discharge                                                                  Assessment Details   Total duration spent with the patient: 35 min     CPT code(s) utilized: 59304 - Psychotherapy for Crisis - 60 (30-74*) min    ANGÉLICA Yung, Psychotherapist  DEC - Triage & Transition Services  Callback: [phone]  ANGÉLICA Yung on 3/11/2025 at 10:03 PM

## 2025-03-12 NOTE — ED PROVIDER NOTES
EMERGENCY DEPARTMENT ENCOUNTER      NAME: Na De León  AGE: 24 year old female  YOB: 2000  MRN: [id number]  EVALUATION DATE & TIME: 3/11/2025  6:51 PM    PCP: Frances Don    ED PROVIDER: Jane Ring MD      Chief Complaint   Patient presents with    Suicidal         FINAL IMPRESSION:  1. Suicidal ideation    2. Anxiety          ED COURSE & MEDICAL DECISION MAKING:    Pertinent Labs & Imaging studies reviewed. (See chart for details)  24 year old female presents to the Emergency Department for evaluation of suicidal ideation with a plan to overdose on prescription medications.  She reports that over the last 3 weeks, she has had increased suicidal thoughts after breaking up with her boyfriend 3 months ago.  Prior to breaking up with her boyfriend 3 months ago, she had stopped taking her Wellbutrin, Lexapro, hydroxyzine as she did not want to be on medications for depression anymore.  She currently lives with roommates, feels she lives in a supportive environment.  She came in here after her supportive parents wanted her to be seen.  DEC did meet with the patient.  She was able to contract for safety with the patient and the patient's mother.  The patient is willing to move in with her parents to have increased support during this time.  She will follow-up with her psychiatrist and her therapist and increase her therapy appointments.  Patient states hydroxyzine has not helped for her anxiety.  I did prescribe a short course of benzodiazepines and asked that the mom have access to the prescription only and give it to the patient as needed for anxiety.  She is comfortable with this plan.  They are comfortable with discharge home with follow-up with their established psychiatrist and therapist.  I discussed with them having the psychiatrist reinitiate her depression medications.    8:40 PM I spoke with the DEC , will plan to prescribe a short course of benzodiazepines for  anxiety as the patient feels hydroxyzine is not helping. I would recommend follow-up with her psychiatrist to discuss restarting her medications.     At the conclusion of the encounter I discussed the results of all of the tests and the disposition. The questions were answered. The patient or family acknowledged understanding and was agreeable with the care plan.       Medical Decision Making  Obtained supplemental history:Supplemental history obtained?: No  Reviewed external records: External records reviewed?: No  Care impacted by chronic illness:Documented in Chart  Did you consider but not order tests?: Work up considered but not performed and documented in chart, if applicable  Did you interpret images independently?: Independent interpretation of ECG and images noted in documentation, when applicable.  Consultation discussion with other provider:Did you involve another provider (consultant, , pharmacy, etc.)?: I discussed the care with another health care provider, see documentation for details.  Discharge. I prescribed additional prescription strength medication(s) as charted. See documentation for any additional details.    MIPS: Not Applicable        MEDICATIONS GIVEN IN THE EMERGENCY:  Medications - No data to display    NEW PRESCRIPTIONS STARTED AT TODAY'S ER VISIT  Discharge Medication List as of 3/11/2025  8:50 PM        START taking these medications    Details   LORazepam (ATIVAN) 0.5 MG tablet Take 1 tablet (0.5 mg) by mouth every 6 hours as needed for anxiety., Disp-6 tablet, R-0, Local Print                =================================================================    HPI    Patient information was obtained from: Patient    Use of : N/A         Na NOEMY De León is a 24 year old female with a pertinent history of depression and anxiety who presents to this ED for evaluation of suicidal ideation. Over the last three weeks she has had thoughts of overdosing on medications. She  broke up with her boyfriend three months ago, and the stressors of that on top of stressors at work and her health have been mounting. She stopped taking her medications for depression about three months ago prior to breaking up with her boyfriend. She was previously on welbutrin, lexapro, and hydroxyzine. She stopped taking her medications because she didn't want to be on medications long term. She does see a therapist once a month. No previous suicide attempt.       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 Indocyanine green (ICG) angiography.;  Surgeon: Jacinto Campbell MD;  Location: [facility] OR    DENTAL SURGERY      wisdom teeth    GI SURGERY  Right Hemicolectomy           CURRENT MEDICATIONS:    LORazepam (ATIVAN) 0.5 MG tablet  buPROPion (WELLBUTRIN XL) 150 MG 24 hr tablet  butalbital-acetaminophen-caffeine (ESGIC) -40 MG tablet  escitalopram (LEXAPRO) 20 MG tablet  hydrOXYzine lilia (VISTARIL) 25 MG capsule  spironolactone (ALDACTONE) 50 MG tablet        ALLERGIES:  Allergies   Allergen Reactions    Grass Extracts [Gramineae Pollens]      Other reaction(s): Other (see comments)  Seasonal    Onabotulinumtoxina Itching    Other Environmental Allergy Unknown     Seasonal       FAMILY HISTORY:  Family History   Problem Relation Age of Onset    Basal cell carcinoma Mother     Anxiety Disorder Mother     Hyperlipidemia Father     Prostate Cancer Father     Anxiety Disorder Sister     Anxiety Disorder Sister     No Known Problems Sister     Chronic Obstructive Pulmonary Disease Maternal Grandmother     Rheumatoid Arthritis Maternal  Grandmother     Hypertension Maternal Grandmother     Depression Maternal Grandfather     Alzheimer Disease Maternal Grandfather     Prostate Cancer Maternal Grandfather     Pacemaker Paternal Grandmother     Prostate Cancer Paternal Grandfather     Deep Vein Thrombosis (DVT) No family hx of        SOCIAL HISTORY:   Social History     Socioeconomic History    Marital status: Single   Tobacco Use    Smoking status: Never    Smokeless tobacco: Never   Vaping Use    Vaping status: Never Used   Substance and Sexual Activity    Alcohol use: Yes    Drug use: No    Sexual activity: Yes     Partners: Male     Birth control/protection: Implant   Other Topics Concern    Parent/sibling w/ CABG, MI or angioplasty before 65F 55M? No   Social History Narrative    Lives at home with mother, father and 3 sisters.     Social Drivers of Health     Financial Resource Strain: Low Risk  (1/22/2025)    Financial Resource Strain     Within the past 12 months, have you or your family members you live with been unable to get utilities (heat, electricity) when it was really needed?: No   Food Insecurity: Low Risk  (1/22/2025)    Food Insecurity     Within the past 12 months, did you worry that your food would run out before you got money to buy more?: No     Within the past 12 months, did the food you bought just not last and you didn t have money to get more?: No   Transportation Needs: Low Risk  (1/22/2025)    Transportation Needs     Within the past 12 months, has lack of transportation kept you from medical appointments, getting your medicines, non-medical meetings or appointments, work, or from getting things that you need?: No   Physical Activity: Insufficiently Active (1/22/2025)    Exercise Vital Sign     Days of Exercise per Week: 3 days     Minutes of Exercise per Session: 40 min   Stress: No Stress Concern Present (1/22/2025)    [employer] [place] [employer] - Occupational Stress Questionnaire     Feeling of Stress :  "Not at all   Social Connections: Unknown (1/22/2025)    Social Connection and Isolation Panel [NHANES]     Frequency of Social Gatherings with Friends and Family: Twice a week   Interpersonal Safety: Low Risk  (1/23/2025)    Interpersonal Safety     Do you feel physically and emotionally safe where you currently live?: Yes     Within the past 12 months, have you been hit, slapped, kicked or otherwise physically hurt by someone?: No     Within the past 12 months, have you been humiliated or emotionally abused in other ways by your partner or ex-partner?: No   Housing Stability: High Risk (1/22/2025)    Housing Stability     Do you have housing? : No     Are you worried about losing your housing?: No       VITALS:  /84   Pulse 69   Temp 98.6  F (37  C)   Resp 16   Ht 1.6 m (5' 3\")   Wt 59 kg (130 lb)   LMP 02/18/2025 (Approximate)   SpO2 100%   BMI 23.03 kg/m      PHYSICAL EXAM    Constitutional: Well developed, Well nourished, NAD  HENT: Normocephalic, Atraumatic, Bilateral external ears normal, Oropharynx normal, mucous membranes moist, Nose normal.   Neck- Normal range of motion, No tenderness, Supple, No stridor.  Eyes: PERRL, EOMI, Conjunctiva normal, No discharge.   Respiratory: Normal breath sounds, No respiratory distress  Cardiovascular: Normal heart rate, Regular rhythm  Musculoskeletal: No edema. Good range of motion in all major joints. No tenderness to palpation or major deformities noted.   Integument: Warm, Dry, No erythema, No rash  Neurologic: Alert & oriented x 3, Normal motor function, Normal sensory function, No focal deficits noted. Normal gait.   Psychiatric: flat affect, cooperative, normal eye contact     LAB:  All pertinent labs reviewed and interpreted.       RADIOLOGY:  Reviewed all pertinent imaging. Please see official radiology report.  No orders to display         Jane Ring MD  Emergency Medicine  [facility] EMERGENCY ROOM  [address] [id number] [phone]       Jane Ring MD  03/11/25 4278

## 2025-03-13 VITALS
WEIGHT: 130 LBS | OXYGEN SATURATION: 98 % | RESPIRATION RATE: 16 BRPM | SYSTOLIC BLOOD PRESSURE: 107 MMHG | HEIGHT: 63 IN | DIASTOLIC BLOOD PRESSURE: 73 MMHG | HEART RATE: 99 BPM | TEMPERATURE: 97.9 F | BODY MASS INDEX: 23.04 KG/M2

## 2025-03-13 PROBLEM — F41.9 ANXIETY: Status: ACTIVE | Noted: 2020-10-06

## 2025-03-13 PROBLEM — F41.1 GENERALIZED ANXIETY DISORDER: Status: ACTIVE | Noted: 2025-03-13

## 2025-03-13 PROBLEM — F33.9 RECURRENT MAJOR DEPRESSION: Status: ACTIVE | Noted: 2025-03-13

## 2025-03-13 PROCEDURE — 250N000013 HC RX MED GY IP 250 OP 250 PS 637: Performed by: EMERGENCY MEDICINE

## 2025-03-13 RX ORDER — SPIRONOLACTONE 25 MG/1
150 TABLET ORAL DAILY
Status: DISCONTINUED | OUTPATIENT
Start: 2025-03-13 | End: 2025-03-13

## 2025-03-13 RX ORDER — BUPROPION HYDROCHLORIDE 150 MG/1
150 TABLET ORAL DAILY
Status: DISCONTINUED | OUTPATIENT
Start: 2025-03-13 | End: 2025-03-13 | Stop reason: HOSPADM

## 2025-03-13 RX ORDER — ESCITALOPRAM OXALATE 10 MG/1
10 TABLET ORAL DAILY
Status: DISCONTINUED | OUTPATIENT
Start: 2025-03-13 | End: 2025-03-13 | Stop reason: HOSPADM

## 2025-03-13 RX ORDER — ACETAMINOPHEN 325 MG/1
650 TABLET ORAL EVERY 4 HOURS PRN
Status: DISCONTINUED | OUTPATIENT
Start: 2025-03-13 | End: 2025-03-13 | Stop reason: HOSPADM

## 2025-03-13 RX ORDER — LORAZEPAM 1 MG/1
2 TABLET ORAL ONCE
Status: COMPLETED | OUTPATIENT
Start: 2025-03-13 | End: 2025-03-13

## 2025-03-13 RX ORDER — ESCITALOPRAM OXALATE 20 MG/1
20 TABLET ORAL DAILY
Status: DISCONTINUED | OUTPATIENT
Start: 2025-03-13 | End: 2025-03-13

## 2025-03-13 RX ORDER — SPIRONOLACTONE 25 MG/1
50 TABLET ORAL DAILY
Status: DISCONTINUED | OUTPATIENT
Start: 2025-03-13 | End: 2025-03-13 | Stop reason: HOSPADM

## 2025-03-13 RX ORDER — HYDROXYZINE HYDROCHLORIDE 25 MG/1
25 TABLET, FILM COATED ORAL 3 TIMES DAILY PRN
Status: DISCONTINUED | OUTPATIENT
Start: 2025-03-13 | End: 2025-03-13 | Stop reason: HOSPADM

## 2025-03-13 RX ADMIN — ESCITALOPRAM OXALATE 10 MG: 10 TABLET ORAL at 07:54

## 2025-03-13 RX ADMIN — SPIRONOLACTONE 50 MG: 25 TABLET, FILM COATED ORAL at 07:54

## 2025-03-13 RX ADMIN — BUPROPION HYDROCHLORIDE 150 MG: 150 TABLET, EXTENDED RELEASE ORAL at 07:54

## 2025-03-13 RX ADMIN — LORAZEPAM 2 MG: 1 TABLET ORAL at 03:07

## 2025-03-13 ASSESSMENT — ACTIVITIES OF DAILY LIVING (ADL)
ADLS_ACUITY_SCORE: 50

## 2025-03-13 ASSESSMENT — COLUMBIA-SUICIDE SEVERITY RATING SCALE - C-SSRS
SUICIDE, SINCE LAST CONTACT: NO
ATTEMPT SINCE LAST CONTACT: NO
TOTAL  NUMBER OF INTERRUPTED ATTEMPTS SINCE LAST CONTACT: NO
6. HAVE YOU EVER DONE ANYTHING, STARTED TO DO ANYTHING, OR PREPARED TO DO ANYTHING TO END YOUR LIFE?: NO
2. HAVE YOU ACTUALLY HAD ANY THOUGHTS OF KILLING YOURSELF?: NO
1. SINCE LAST CONTACT, HAVE YOU WISHED YOU WERE DEAD OR WISHED YOU COULD GO TO SLEEP AND NOT WAKE UP?: NO
TOTAL  NUMBER OF ABORTED OR SELF INTERRUPTED ATTEMPTS SINCE LAST CONTACT: NO

## 2025-03-13 NOTE — PROGRESS NOTES
"Triage and Transition Services Extended Care Reassessment     Patient: Na goes by \"Na,\" uses she/her pronouns  Date of Service: March 13, 2025  Site of Service: [facility]                               Patient was seen yes  Mode of Assessment: Virtual: [facility]     Reason for Reassessment: suicidal ideation    History of Patient's Original Emergency Room Encounter: Dutch De León is a 24 year old female with a pertinent history of anxiety and depressive disorder, who presents to this ED around 10 pm with parents by private car for evaluation of suicidal.  The patient reports that she has \"bad mental health issues\" and that this has worsened since being seen in the ED last night. She has had suicidal ideation for years. Patient had \"a plan\" to take overdose medications recently to enact suicide but has never attempted suicide or engaged in NSSI. She reports having constant SI thoughts for the past three weeks. Patient noted that she stopped taking her medications three months ago, and that's when \"shit hit the fan.\" She said she stopped taking her medication because she will soon be off her parents insurance and not be able to afford it. Patient drinks occasionally but does not report problems with substances.  She seems overwhelmed easily.  PHP would not likely be something she could sustain.  She says she has an interview for a possible promotion. While stating, \"I can't take it anymore,\" she is forward looking.  She sees her therapist only once per month but does agree to increase this to at least every other week.  We talk about getting on medications for today with worry about losing parents insurance to leave for ifuture.  Getting back on medications \"has been plan\" for awhile but has not materialized.  She says she doesn't really like being on medications but acknowledges she needs something. After making agreement with patient, she told provider " "she \"had to leave\" because she has interview and \"would come back.\"  Her interview is at 3 pm.  A psych consult was ordered for this encounter later in morning.    Current Patient Presentation: Pt has had two recent ER visits within the past several days due to increased symptoms of anxiety and depression with suicidal ideation. The patient denies having active suicidal ideation and therefore denies having plan or intent. She denies homicidal thinking, thoughts for self-harm, and auditory or visual hallucinations. Thought processes are organized and logical. She seems appropriate to care for herself in the community. She exhibits future oriented thinking and ability to engage in safety planning.    Presentation Summary: Pt is seen by [facility] for therapeutic check-in and reassessment. Exchanged greeting, introduced self and role. Pt was observed to be able to participate in the assessment as evidenced by verbal consent. Pt is alert, oriented x5, calm, and cooperative. She is able to engage in a meaningful conversation regarding mental health symptoms, supports, and coping skills. She seems appropriate to make decisions and take care of herself in the community. She is forward thinking as she speaks about seeking a promotion at her job and other potential goals for the future. She identifies protective factors such as family, friends, and her dog. She has a therapist she sees 1x monthly but has seen more frequent in the past. She says she likes the therapist and feels like she has connected and made progress with her. She wants to continue that therapist relationship and is agreeable to reach out to increase the frequency of visits. She reports to have a psychaitric provider she has been following with but hasn't seen in at least two months. She is agreeable to schedule with that person and continue with medication management and evaluation. Pt is requesting discharge today. Her parents are both present during " this interview and are agreeable to this safety planning process and plans to continue with managing mental health.    Changes Observed Since Initial Assessment: decrease in presenting symptoms    Therapeutic Interventions Provided: Engaged in safety planning, Engaged in cognitive restructuring/ reframing, looked at common cognitive distortions and challenged negative thoughts., Engaged in guided discovery, explored patient's perspectives and helped expand them through socratic dialogue.    Current Symptoms: anxious helplessness, sense of doom anxious        Mental Status Exam   Affect: Appropriate  Appearance: Appropriate  Attention Span/Concentration: Attentive  Eye Contact: Engaged    Fund of Knowledge: Appropriate   Language /Speech Content: Fluent  Language /Speech Volume: Normal  Language /Speech Rate/Productions: Normal  Recent Memory: Intact  Remote Memory: Intact  Mood: Anxious  Orientation to Person: Yes   Orientation to Place: Yes  Orientation to Time of Day: Yes  Orientation to Date: Yes     Situation (Do they understand why they are here?): Yes  Psychomotor Behavior: Normal  Thought Content: Clear  Thought Form: Goal Directed    Treatment Objective(s) Addressed: rapport building, orienting the patient to therapy, processing feelings, assessing safety, identifying an appropriate aftercare plan    Patient Response to Interventions: acceptance expressed, verbalizes understanding    Progress Towards Goals:  Patient Reports Symptoms Are: stable  Patient Progress Toward Goals: is making progress  Comment: Pt reports she is feeling better and has returned to baseline. She reports she does not require inpatient mental health treatment and clinician agrees. Pt is offered assistance for IOP, PHP, or finding new providers and she declines.  Next Step to Work Toward Discharge:  (Pt will follow up with her outpatient providers she has been utilizing to manage mental health.)    Case Management:      C-SSRS Since  Last Contact:   1. Wish to be Dead (Since Last Contact): No  2. Non-Specific Active Suicidal Thoughts (Since Last Contact): No     Actual Attempt (Since Last Contact): No  Has subject engaged in non-suicidal self-injurious behavior? (Since Last Contact): No  Interrupted Attempts (Since Last Contact): No  Aborted or Self-Interrupted Attempt (Since Last Contact): No  Preparatory Acts or Behavior (Since Last Contact): No  Suicide (Since Last Contact): No     Calculated C-SSRS Risk Score (Since Last Contact): No Risk Indicated    Plan: Final Disposition / Recommended Care Path: discharge  Plan for Care reviewed with assigned Medical Provider: yes  Plan for Care Team Review: provider    Comments: Dr Poe agrees to this plan for discharge.  Patient and/or validated legal guardian concurs: yes    Clinical Substantiation: Pt has had two recent ER visits within the past several days due to increased symptoms of anxiety and depression with suicidal ideation. The patient denies having active suicidal ideation and therefore denies having plan or intent. She denies homicidal thinking, thoughts for self-harm, and auditory or visual hallucinations. Thought processes are organized and logical. She seems appropriate to care for herself in the community. She exhibits future oriented thinking and ability to engage in safety planning. Pt will discharge and follow up with her outpatient providers she has been utilizing to manage mental health.    Legal Status: Legal Status: Voluntary/Patient has signed consent for treatment    Session Status: Time session started: 0954  Time session ended: 1012  Session Duration (minutes): 18 minutes  Session Number: 1  Anticipated number of sessions or this episode of care: 1    Session Start Time: 0954  Session Stop Time: 1012  CPT codes: 40597 - Psychotherapy (with patient) - 30 (16-37*) min  Time Spent: 18 minutes      CPT code(s) utilized: 01688 - Psychotherapy (with patient) - 30 (16-37*)  min    Diagnosis:   Patient Active Problem List   Diagnosis Code    Concussion without loss of consciousness S06.0X0A    Migraine without aura and without status migrainosus, not intractable G43.009    Anxiety F41.9    S/P right hemicolectomy Z90.49    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    Atypical squamous cells of undetermined significance (ASCUS) on Papanicolaou smear of cervix R87.610    Nexplanon in place Z97.5    Anal fissure K60.2    Other secondary neuroendocrine tumors (H) C7B.8    Mild episode of recurrent major depressive disorder F33.0    Recurrent major depression F33.9    Generalized anxiety disorder F41.1       Primary Problem This Admission: Active Hospital Problems    Recurrent major depression      *Generalized anxiety disorder    F41.1      Tyson Johnson, [facility]   Licensed Mental Health Professional (LMHP), Extended Care  [phone]

## 2025-03-13 NOTE — DISCHARGE INSTRUCTIONS
"    Recommendations:    1)  Contact your psychiatric and therapy providers for scheduling of services. Request an increase in frequency for therapy.  2)  Consider an intensive outpatient treatment program to quickly enhance or improve mental health symptoms.  3)  Contact the Extended Care team ([phone]) of [place] for assistance in scheduling IOP Programming.     If I am feeling unsafe or I am in a crisis, I will:  - Contact my established care providers   - Call the National Suicide Prevention Lifeline: 908.915.2771   - Por espanol, texto  ANTOINETTE a 016502 o texto a 442-AYUDAME en WhatsApp  - [place] CRISIS TEXT Line 099907  - Go to the nearest emergency room   - Call 911 or 948 or 211    Below is a list of FREE Mental Health Options in the [facility] Area:  [facility] ([facility]). Acute Psychiatric Services (APS) provides emergency services, 24 hours a day 7 days a week, to people experiencing mental health crises, including psychosis, depression, violence or suicide, and other crisis situations. [address] [place] [address].  Suicidal: [phone] Consultation: [phone]     [facility] (Free Services)   [address] [place] [id number]  [phone]  [email or web address].[email or web address]    Peer Support  Call the JOSE Helpline at  112.916.5220  Or text \"HelpLine\" to 72001    Other options to get started with an ongoing therapist by calling to schedule your intake at one of the following clinics:  [facility]: [phone]  [facility]  [phone]  [facility] [phone]  [facility] [phone]  [facility] [phone]  [facility]  [phone]   [facility] in [facility] [phone]  [facility] [phone]    What is Adult Intensive Outpatient Treatment (IOP)  Adult Intensive Outpatient Treatment (IOP) in [place] is a structured program designed to help " individuals manage mental health and substance use disorders without requiring full-time hospitalization. Here are some key aspects:    Structure and Schedule: Typically, IOP involves attending therapy sessions for several hours a day, multiple days a week. For example, programs might run for 3 hours a day, 4 days a week, over a period of about 9 weeks.    Therapeutic Approach: These programs often include group therapy, individual counseling, and educational sessions. The focus is on psychotherapy, skill development, and education to help stabilize symptoms and improve coping mechanisms.    Step-Down Care: IOP can serve as a step-down from inpatient hospitalization or as an alternative for those who do not require 24-hour care.    Specialized Programs: Some centers offer specialized IOPs for different needs, such as mental health disorders, substance use disorders, or dual diagnosis (co-occurring mental health and substance use disorders).    Examples of Local Providers: In [place], there are several providers offering IOP services, including [facility], [facility], and formerly [facility].

## 2025-03-13 NOTE — CONSULTS
"Diagnostic Evaluation Consultation  Crisis Assessment    Patient Name: Na De León  Age:  24 year old  Legal Sex: female  Gender Identity: female  Pronouns:   Race: White  Ethnicity: Not  or   Language: English      Patient was assessed: Virtual: [facility]   Crisis Assessment Start Date: 03/13/25  Crisis Assessment Start Time: 1253  Crisis Assessment Stop Time: 0135  Patient location: [facility]                             ED-    Referral Data and Chief Complaint  Na De León presents to the ED with family/friends. Patient is presenting to the ED for the following concerns: Suicidal ideation, Depression, Worsening psychosocial stress, Anxiety. Factors that make the mental health crisis life threatening or complex are: easily overwhelmed, seems to have trouble organizing more than a couple of things at once; impulsive.      Informed Consent and Assessment Methods  Explained the crisis assessment process, including applicable information disclosures and limits to confidentiality, assessed understanding of the process, and obtained consent to proceed with the assessment.  Assessment methods included conducting a formal interview with patient, review of medical records, collaboration with medical staff, and obtaining relevant collateral information from family and community providers when available.  : done     History of the Crisis   Paient Na De León is a 24 year old female with a pertinent history of anxiety and depressive disorder, who presents to this ED around 10 pm with parents by private car for evaluation of suicidal.  The patient reports that she has \"bad mental health issues\" and that this has worsened since being seen in the ED last night. She has had suicidal ideation for years. Patient had \"a plan\" to take overdose medications recently to enact suicide but has never attempted suicide or engaged in NSSI. She reports having " "constant SI thoughts for the past three weeks.     Patient noted that she stopped taking her medications three months ago, and that's when \"shit hit the fan.\" She said she stopped taking her medication because she will soon be off her parents insurance and not be able to afford it. Patient drinks occasionally but does not report problems with substances.      She seems overwhelmed easily.  PHP would not likely be something she could sustain or manage well.  She says she has an interview for a possible promotion tomorrow at 3 pm. While stating, \"I can't take it anymore,\" she is forward looking to this extent.  She sees her therapist only once per month but does agree to increase this to at least every other week.  We talk about getting on medications for today with worry about losing parents insurance to leave for ifuture.  Getting back on medications \"has been plan\" for awhile but has not materialized.  She says she doesn't really like being on medications but acknowledges she needs something. After making agreement with patient, she told provider she \"had to leave\" because she has interview and \"would come back.\"  Her interview is at 3 pm.  A psych consult was ordered for this encounter later in morning.    Brief Psychosocial History  Family:  Single, Children no  Support System:  Parent(s)  Employment Status:  employed full-time  Source of Income:  salary/wages  Financial Environmental Concerns:  none  Current Hobbies:  social media/computer activities, interaction with pets, group/social activities, television/movies/videos, family functions  Barriers in Personal Life:  mental health concerns    Significant Clinical History  Current Anxiety Symptoms:  racing thoughts, shortness of breath or racing heart, anxious  Current Depression/Trauma:  thoughts of death/suicide, crying or feels like crying, sadness, avoidance, difficulty concentrating  Current Somatic Symptoms:  racing thoughts, anxious  Current " "Psychosis/Thought Disturbance:  forgetful, displaces blame  Current Eating Symptoms:  loss of appetite (no hx of eating dx)  Chemical Use History:  Alcohol: Social   Past diagnosis:  Anxiety Disorder, Depression (shows symptomology of ADHD with disorganization, etc but is adamant \"that isn't even in the cards.\"  The symptoms are however along with anxiety symptoms)  Family history:  Anxiety Disorder, Depression  Past treatment:  Individual therapy, Psychiatric Medication Management  Details of most recent treatment:  sees therapist 1/month  Other relevant history:  Lives with two roomates and her dog. Biological parents are together, and pt has three sisters. Medical history signfigant for cancer. No legal history.    Have there been any medication changes in the past two weeks:  patient is not on psychiatric meds       Is the patient compliant with medications:  no        Collateral Information  Is there collateral information: Yes     Collateral information name, relationship, phone number:  Maggie De León \"Jessica\" (Mother) [phone]    What happened today: Was at ED yesterday.  said things were too overwhelming now, \"can't handle it.\"     What is different about patient's functioning: She seems to \"change on a dime.\"  It's hard to keep up.  Things will be fine, then, opposite.     What do you think the patient needs:      Has patient made comments about wanting to kill themselves/others: yes    If d/c is recommended, can they take part in safety/aftercare planning:  yes    Additional collateral information:  The \"back and forth\" seems to be putting a strain on parents.     Risk Assessment  [place] Suicide Severity Rating Scale Full Clinical Version:  Suicidal Ideation  Q1 Wish to be Dead (Lifetime): Yes  Q2 Non-Specific Active Suicidal Thoughts (Lifetime): Yes  3. Active Suicidal Ideation with any Methods (Not Plan) Without Intent to Act (Lifetime): Yes  4. Active Suicidal Ideation with Some Intent to Act, " Without Specific Plan (Lifetime): Yes  5. Active Suicidal Ideation with Specific Plan and Intent (Lifetime): No  Q6 Suicide Behavior (Lifetime): no     Suicidal Behavior (Lifetime)  Actual Attempt (Lifetime): No  Has subject engaged in non-suicidal self-injurious behavior? (Lifetime): No    [place] Suicide Severity Rating Scale Recent:   Suicidal Ideation (Recent)  Q1 Wished to be Dead (Past Month): yes  Q2 Suicidal Thoughts (Past Month): yes  Q3 Suicidal Thought Method: yes  Q4 Suicidal Intent without Specific Plan: no  Q5 Suicide Intent with Specific Plan: no  If yes to Q6, within past 3 months?: no  Level of Risk per Screen: moderate risk     Suicidal Behavior (Recent)  Actual Attempt (Past 3 Months): No  Has subject engaged in non-suicidal self-injurious behavior? (Past 3 Months): No    Environmental or Psychosocial Events: challenging interpersonal relationships, loss of a relationship due to divorce/separation  Protective Factors: Protective Factors: strong bond to family unit, community support, or employment, responsibilities and duties to others, including pets and children, lives in a responsibly safe and stable environment, supportive ongoing medical and mental health care relationships, sense of importance of health and wellness    Does the patient have thoughts of harming others? Feels Like Hurting Others: no  Previous Attempt to Hurt Others: no  Current presentation: Irritable  Violence Threats in Past 6 Months: n/a  Current Violence Plan or Thoughts: no  Is the patient engaging in sexually inappropriate behavior?: no  Duty to warn initiated: no  Does Patient have a known history of aggressive behavior: No    Is the patient engaging in sexually inappropriate behavior?  no        Mental Status Exam   Affect:    Appearance:    Attention Span/Concentration:    Eye Contact:      Fund of Knowledge:     Language /Speech Content:    Language /Speech Volume:    Language /Speech Rate/Productions:    Recent  Memory:    Remote Memory:    Mood:    Orientation to Person:     Orientation to Place:    Orientation to Time of Day:    Orientation to Date:       Situation (Do they understand why they are here?):    Psychomotor Behavior:    Thought Content:    Thought Form:       Mini-Cog Assessment  Number of Words Recalled:    Clock-Drawing Test:     Three Item Recall:    Mini-Cog Total Score:       Medication  Psychotropic medications:   Medication Orders - Psychiatric (From admission, onward)      None             Current Care Team  Patient Care Team:  Frances Don MD as PCP - General (Family Medicine)  Evan Pacheco RN as Specialty Care Coordinator (Hematology & Oncology)  Ajay Montilla MD as Resident (Student in organized health care education/training program)  Frances Don MD as Assigned PCP  Teena Mcdermott, PhD LP as Psychologist (Neuropsychology)  Teena Mcdermott, PhD LP as Assigned Behavioral Health Provider    Diagnosis  Patient Active Problem List   Diagnosis Code    Concussion without loss of consciousness S06.0X0A    Migraine without aura and without status migrainosus, not intractable G43.009    Anxiety F41.9    S/P right hemicolectomy Z90.49    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    Atypical squamous cells of undetermined significance (ASCUS) on Papanicolaou smear of cervix R87.610    Nexplanon in place Z97.5    Anal fissure K60.2    Other secondary neuroendocrine tumors (H) C7B.8    Mild episode of recurrent major depressive disorder F33.0    Recurrent major depression F33.9    Generalized anxiety disorder F41.1       Primary Problem This Admission  Active Hospital Problems    Recurrent major depression      *Generalized anxiety disorder        Clinical Summary and Substantiation of Recommendations   Clinical Substantiation:  After therapeutic assessment, collateral gathering intervention by [facility] and in consultation  "with attending provider, the patient's circumstances and mental state warrant some definitive action to be taken to help patient manage mental health and risk for self harm prior to being discharged from ED.  Patient initially agreed to psych consult in ED prior to discharge to finally restart medications after stating she \"can't take it anymore,\" and having also been seen yesterday for SI stating she had been \"planning\" on overdosing.  She stated that \"things have gone to shit\" after discontinuing her medicaitons on her own.  Patient's capacity for follow through with starting OP medication management is low due to overwhelm, racing thoughts and difficulty managing multiple tasks. Patient will be seen by psychiatry for medication consult on a voluntary basis with hold contingiency.    Goals for crisis stabilization:  Help patient manage mental health and safety risks.  See psychiatry for medication consult. Patient will then only need to arrange increase in therapy frequency on own following discharge.    Next steps for Care Team:  Psych consult has been ordered.  ED staff to determine if reassessment by extended care or DEC is needed after psych consult.    Treatment Objectives Addressed:  identifying and practicing coping strategies, identifying an appropriate aftercare plan, building distress tolerance, assessing safety, exploring obstacles to safety in the community    Therapeutic Interventions:  Coached on coping techniques/relaxation skills to help improve distress tolerance and managing intense emotions., Taught the link between thoughts, feelings, and behaviors., Introduced and discussed radical acceptance.    Has a specific means been identified for suicidal/homicide actions: Yes    If yes, describe:  overdose on pills    Explain action steps toward mitigation:  patient agreed to stay in ED for psych consult, she then told ED provider she had to leave because she has interview tomorrow.  Patient's " "interview is at 3 PM.    Document completion of mitigation actions:  psych consult is being scheduled.  Due to statements that she was \"planning\" on taking pills to OD recently, she is \"voluntary but holdable.\"  She was just in ED yesterday for SI    The follow up action still needed prior to discharge:  psych consult scheduled    Patient coping skills attempted to reduce the crisis:  help seeking    Disposition  Recommended referrals: Other. please comment, Individual Therapy, Medication Management        Reviewed case and recommendations with attending provider. Attending Name: Mayra Martell MD       Attending concurs with disposition: yes       Patient and/or validated legal guardian concurs with disposition:    (variable)       Final disposition:  observation (see plan of care)                            Legal status: Voluntary/Patient has signed consent for treatment (voluntary but holdable)                                                                                                                                 Reviewed court records: yes       Assessment Details   Total duration spent with the patient: 42 min     CPT code(s) utilized: 06522 - Psychotherapy for Crisis - 60 (30-74*) min    Nettie Chatterjee [facility], Psychotherapist  [facility] - Triage & Transition Services  Callback: [phone]          "

## 2025-03-13 NOTE — ED PROVIDER NOTES
EMERGENCY DEPARTMENT ENCOUnter      NAME: Na De León  AGE: 24 year old female  YOB: 2000  MRN: [id number]  EVALUATION DATE & TIME: 3/12/2025 10:15 PM    PCP: Frances Don    ED PROVIDER: Mayra Woo MD      Chief Complaint   Patient presents with    Suicidal         FINAL IMPRESSION:  1. Suicidal ideation          ED COURSE & MEDICAL DECISION MAKING:      In summary, the patient is a 24-year-old female that presents to the emergency department for evaluation of suicidal ideation with a plan.  This is the second day in a row that she is presenting to the emergency with suicidal ideation.  DEC  and myself recommend further mental health evaluation by the psychiatrist this morning in the emergency department.  She is voluntary but holdable  10:20 PM I met with the patient, obtained history, performed an initial exam, and discussed options and plan for diagnostics and treatment here in the ED.   2315-patient is medically clear for behavioral health disposition  1:49 AM I spoke with DEC and they recommend that patient is reassessed by psychiatry in the morning.  Discussed this plan with patient and her family and she does not want to stay overnight in the emergency department.  I consulted DEC to see if there are other options and we both felt that the patient needed further assessment.  I advised the patient that she is holdable if she tried to leave.  Ativan 2 mg p.o. was administered for anxiolysis.  0500-patient is signed out at change of shift with psychiatry consult pending.    Medical Decision Making  Obtained supplemental history:Supplemental history obtained?: No  Reviewed external records: External records reviewed?: Documented in chart  Care impacted by chronic illness:Mental Health  Did you consider but not order tests?: Work up considered but not performed and documented in chart, if applicable  Did you interpret images independently?: Independent  "interpretation of ECG and images noted in documentation, when applicable.  Consultation discussion with other provider:Did you involve another provider (consultant, , pharmacy, etc.)?: I discussed the care with another health care provider, see documentation for details.        MIPS (CTPE, Dental pain, Valdez, Sinusitis, Asthma/COPD, Head Trauma):     At the conclusion of the encounter I discussed the results of all of the tests and the disposition. The questions were answered. The patient or family acknowledged understanding and was agreeable with the care plan.         MEDICATIONS GIVEN IN THE EMERGENCY:  Medications   LORazepam (ATIVAN) tablet 2 mg (2 mg Oral $Given 3/13/25 8291)       NEW PRESCRIPTIONS STARTED AT TODAY'S ER VISIT  New Prescriptions    No medications on file          =================================================================    HPI        Na De León is a 24 year old female with a pertinent history of anxiety and depressive disorder, who presents to this ED by private car for evaluation of suicidal.     The patient reports that she has bad mental health issues and that this has worsened since being seen in the ED last night. She has had suicidal ideation for years. Patient has a plan to take overuse medications to enact suicide but has never attempted. She reports having constant SI thoughts for the past three weeks, and her parents are concerned. Patient noted that she stopped taking her medications three months ago, and that's when \"shit hit the fan.\" She said she stopped taking her medication because she will soon be off her parents insurance and not be able to afford it. Patient drinks occasionally but does not smoke or do drugs.     3/11/2025- [facility] ED visit for suicidal ideation. Patient with plan to overdose on prescription medications. Pt stopped taking her wellbutrin, lexapro and hydroxyzine three months ago. She met with DEC. Pt discharged in stable condition with " prescription for a coarse of benzodiazepines for anxiety. Plan to follow up with psychiatrist.       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 Indocyanine green (ICG) angiography.;  Surgeon: Jacinto Campbell MD;  Location: [facility] OR    DENTAL SURGERY      wisdom teeth    GI SURGERY  Right Hemicolectomy           CURRENT MEDICATIONS:    buPROPion (WELLBUTRIN XL) 150 MG 24 hr tablet  butalbital-acetaminophen-caffeine (ESGIC) -40 MG tablet  escitalopram (LEXAPRO) 20 MG tablet  hydrOXYzine lilia (VISTARIL) 25 MG capsule  LORazepam (ATIVAN) 0.5 MG tablet  spironolactone (ALDACTONE) 50 MG tablet        ALLERGIES:  Allergies   Allergen Reactions    Grass Extracts [Gramineae Pollens]      Other reaction(s): Other (see comments)  Seasonal    Onabotulinumtoxina Itching    Other Environmental Allergy Unknown     Seasonal       FAMILY HISTORY:  Family History   Problem Relation Age of Onset    Basal cell carcinoma Mother     Anxiety Disorder Mother     Hyperlipidemia Father     Prostate Cancer Father     Anxiety Disorder Sister     Anxiety Disorder Sister     No Known Problems Sister     Chronic  Obstructive Pulmonary Disease Maternal Grandmother     Rheumatoid Arthritis Maternal Grandmother     Hypertension Maternal Grandmother     Depression Maternal Grandfather     Alzheimer Disease Maternal Grandfather     Prostate Cancer Maternal Grandfather     Pacemaker Paternal Grandmother     Prostate Cancer Paternal Grandfather     Deep Vein Thrombosis (DVT) No family hx of        SOCIAL HISTORY:   Social History     Socioeconomic History    Marital status: Single     Spouse name: None    Number of children: None    Years of education: None    Highest education level: None   Tobacco Use    Smoking status: Never    Smokeless tobacco: Never   Vaping Use    Vaping status: Never Used   Substance and Sexual Activity    Alcohol use: Yes    Drug use: No    Sexual activity: Yes     Partners: Male     Birth control/protection: Implant   Other Topics Concern    Parent/sibling w/ CABG, MI or angioplasty before 65F 55M? No   Social History Narrative    Lives at home with mother, father and 3 sisters.     Social Drivers of Health     Financial Resource Strain: Low Risk  (1/22/2025)    Financial Resource Strain     Within the past 12 months, have you or your family members you live with been unable to get utilities (heat, electricity) when it was really needed?: No   Food Insecurity: Low Risk  (1/22/2025)    Food Insecurity     Within the past 12 months, did you worry that your food would run out before you got money to buy more?: No     Within the past 12 months, did the food you bought just not last and you didn t have money to get more?: No   Transportation Needs: Low Risk  (1/22/2025)    Transportation Needs     Within the past 12 months, has lack of transportation kept you from medical appointments, getting your medicines, non-medical meetings or appointments, work, or from getting things that you need?: No   Physical Activity: Insufficiently Active (1/22/2025)    Exercise Vital Sign     Days of Exercise per Week: 3 days     " Minutes of Exercise per Session: 40 min   Stress: No Stress Concern Present (1/22/2025)    [employer] - Occupational Stress Questionnaire     Feeling of Stress : Not at all   Social Connections: Unknown (1/22/2025)    Social Connection and Isolation Panel [NHANES]     Frequency of Social Gatherings with Friends and Family: Twice a week   Interpersonal Safety: Low Risk  (1/23/2025)    Interpersonal Safety     Do you feel physically and emotionally safe where you currently live?: Yes     Within the past 12 months, have you been hit, slapped, kicked or otherwise physically hurt by someone?: No     Within the past 12 months, have you been humiliated or emotionally abused in other ways by your partner or ex-partner?: No   Housing Stability: High Risk (1/22/2025)    Housing Stability     Do you have housing? : No     Are you worried about losing your housing?: No       VITALS:  Patient Vitals for the past 24 hrs:   BP Temp Temp src Pulse Resp SpO2 Height Weight   03/13/25 0305 109/62 -- -- 79 -- 97 % -- --   03/12/25 2359 110/67 -- -- 73 -- 98 % -- --   03/12/25 2154 120/73 97.9  F (36.6  C) Temporal 83 16 98 % 1.6 m (5' 3\") 59 kg (130 lb)       PHYSICAL EXAM    Constitutional:  Well developed, Well nourished,  HENT:  Normocephalic, Atraumatic, Bilateral external ears normal, Oropharynx moist, Nose normal.   Neck:  Normal range of motion, No meningismus, No stridor.   Eyes:  EOMI, Conjunctiva normal, No discharge.   Respiratory:  Normal breath sounds, No respiratory distress, No wheezing, No chest tenderness.   Cardiovascular:  Normal heart rate, Normal rhythm, No murmurs  GI:  Soft, No tenderness,   Musculoskeletal:  Neurovascularly intact distally, No edema, No tenderness, No cyanosis, Good range of motion in all major joints.   Integument:  Warm, Dry, No erythema, No rash.   Lymphatic:  No lymphadenopathy noted.   Neurologic:  Alert & oriented, Normal motor function, Normal sensory " function, No focal deficits noted.   Psychiatric:  Affect normal, Judgment normal, Mood normal.      LAB:  All pertinent labs reviewed and interpreted.  Results for orders placed or performed during the hospital encounter of 03/12/25   Comprehensive metabolic panel   Result Value Ref Range    Sodium 139 135 - 145 mmol/L    Potassium 3.6 3.4 - 5.3 mmol/L    Carbon Dioxide (CO2) 18 (L) 22 - 29 mmol/L    Anion Gap 16 (H) 7 - 15 mmol/L    Urea Nitrogen 12.6 6.0 - 20.0 mg/dL    Creatinine 0.82 0.51 - 0.95 mg/dL    GFR Estimate >90 >60 mL/min/1.73m2    Calcium 9.6 8.8 - 10.4 mg/dL    Chloride 105 98 - 107 mmol/L    Glucose 99 70 - 99 mg/dL    Alkaline Phosphatase 31 (L) 40 - 150 U/L    AST 19 0 - 45 U/L    ALT 9 0 - 50 U/L    Protein Total 6.4 6.4 - 8.3 g/dL    Albumin 4.2 3.5 - 5.2 g/dL    Bilirubin Total 0.3 <=1.2 mg/dL   Ethyl Alcohol Level   Result Value Ref Range    Alcohol ethyl <0.01 <=0.01 g/dL   Acetaminophen level   Result Value Ref Range    Acetaminophen <5.0 (L) 10.0 - 30.0 ug/mL   HCG QUALitative pregnancy (blood)   Result Value Ref Range    hCG Serum Qualitative Negative Negative   CBC with platelets and differential   Result Value Ref Range    WBC Count 5.8 4.0 - 11.0 10e3/uL    RBC Count 4.38 3.80 - 5.20 10e6/uL    Hemoglobin 13.4 11.7 - 15.7 g/dL    Hematocrit 39.1 35.0 - 47.0 %    MCV 89 78 - 100 fL    MCH 30.6 26.5 - 33.0 pg    MCHC 34.3 31.5 - 36.5 g/dL    RDW 11.7 10.0 - 15.0 %    Platelet Count 267 150 - 450 10e3/uL    % Neutrophils 46 %    % Lymphocytes 41 %    % Monocytes 11 %    % Eosinophils 1 %    % Basophils 1 %    % Immature Granulocytes 0 %    NRBCs per 100 WBC 0 <1 /100    Absolute Neutrophils 2.7 1.6 - 8.3 10e3/uL    Absolute Lymphocytes 2.4 0.8 - 5.3 10e3/uL    Absolute Monocytes 0.6 0.0 - 1.3 10e3/uL    Absolute Eosinophils 0.1 0.0 - 0.7 10e3/uL    Absolute Basophils 0.0 0.0 - 0.2 10e3/uL    Absolute Immature Granulocytes 0.0 <=0.4 10e3/uL    Absolute NRBCs 0.0 10e3/uL             I,  Kalina Ruvalcaba, am serving as a scribe to document services personally performed by Dr. Woo based on my observation and the provider's statements to me. I, Mayra Woo MD attest that Kalina Ruvalcaba is acting in a scribe capacity, has observed my performance of the services and has documented them in accordance with my direction.    Mayra Woo MD  Emergency Medicine  [facility] EMERGENCY ROOM  [address] [address]  [phone]  Dept: [phone]     Mayra Woo MD  03/13/25 0358

## 2025-03-13 NOTE — ED TRIAGE NOTES
Pt seen for suicidal ideation here yesterday. Says follow-up plan was to have appointment with therapist soon and increase medication dosage. Pt says she does not feel better today. Endorses SI with plan to take medications which has been an intermittent plan for years.

## 2025-03-13 NOTE — PLAN OF CARE
"Na De León  March 13, 2025  Plan of Care Hand-off Note     Patient Recommended Care Path: observation; (psych consult ordered) ED to determine need for DEC reassessment following psych consult     Clinical Substantiation:  After therapeutic assessment, collateral gathering intervention by LMHP and in consultation with attending provider, the patient's circumstances and mental state warrant some definitive action to be taken to help patient manage mental health and risk for self harm prior to being discharged from ED.  Patient initially agreed to psych consult in ED prior to discharge to finally restart medications after stating she \"can't take it anymore,\" and having also been seen yesterday for SI stating she had been \"planning\" on overdosing.  She stated that \"things have gone to shit\" after discontinuing her medicaitons on her own.  Patient's capacity for follow through with starting OP medication management is low due to overwhelm, racing thoughts and difficulty managing multiple task responsibilities. Patient will be seen by psychiatry for medication consult on a voluntary basis with hold contingiency. Patient will then only need to increase frequency of therapy on her own following discharge, if appropriate, as initially agreed.     Goals for crisis stabilization:  Help patient manage mental health and safety risks.  See psychiatry for medication consult. Patient will then only need to arrange increase in therapy frequency on own following discharge.    Next steps for Care Team:  Psych consult has been ordered.  ED staff to determine if reassessment by extended care or DEC is needed after psych consult.    Treatment Objectives Addressed:  identifying and practicing coping strategies, identifying an appropriate aftercare plan, building distress tolerance, assessing safety, exploring obstacles to safety in the community    Therapeutic Interventions:  Coached on coping techniques/relaxation skills to help " "improve distress tolerance and managing intense emotions., Taught the link between thoughts, feelings, and behaviors., Introduced and discussed radical acceptance.    Has a specific means been identified for suicidal.homicide actions: Yes  If yes, describe: overdose on pills  Explain action steps toward mitigation: patient agreed to stay in ED for psych consult, she then told ED provider she had to leave because she has interview tomorrow.  Patient's interview is at 3 PM.  Document completion of mitigation action: psych consult is being scheduled.  Due to statements that she was \"planning\" on taking pills to OD recently, she is \"voluntary but holdable.\"  She was just in ED yesterday for SI  The follow up action still needed prior to discharge: psych consult scheduled    Patient coping skills attempted to reduce the crisis:  help seeking                          Collateral contact information:  Maggie De León \"Maggie Serrano\" (Mother) [phone]    Legal Status: Voluntary/Patient has signed consent for treatment (voluntary but holdable)                                                                                                                                 Reviewed court records: yes     Psychiatry Consult: Patient has Psychiatry Consult Order    ANGÉLICA Hoyos        "

## 2025-03-13 NOTE — ED PROVIDER NOTES
EMERGENCY DEPARTMENT SIGN OUT NOTE        ED COURSE AND MEDICAL DECISION MAKING  Patient was signed out to me by Dr Mayra Woo at 3:56 AM    In brief, Na De León is a 24 year old female who initially presented for suicidal ideation.      At time of sign out, disposition was pending psych reevaluation ***    FINAL IMPRESSION    1. Suicidal ideation        ED MEDS  Medications   LORazepam (ATIVAN) tablet 2 mg (2 mg Oral $Given 3/13/25 2201)       LAB  Labs Ordered and Resulted from Time of ED Arrival to Time of ED Departure   COMPREHENSIVE METABOLIC PANEL - Abnormal       Result Value    Sodium 139      Potassium 3.6      Carbon Dioxide (CO2) 18 (*)     Anion Gap 16 (*)     Urea Nitrogen 12.6      Creatinine 0.82      GFR Estimate >90      Calcium 9.6      Chloride 105      Glucose 99      Alkaline Phosphatase 31 (*)     AST 19      ALT 9      Protein Total 6.4      Albumin 4.2      Bilirubin Total 0.3     ACETAMINOPHEN LEVEL - Abnormal    Acetaminophen <5.0 (*)    ETHYL ALCOHOL LEVEL - Normal    Alcohol ethyl <0.01     HCG QUALITATIVE PREGNANCY - Normal    hCG Serum Qualitative Negative     CBC WITH PLATELETS AND DIFFERENTIAL    WBC Count 5.8      RBC Count 4.38      Hemoglobin 13.4      Hematocrit 39.1      MCV 89      MCH 30.6      MCHC 34.3      RDW 11.7      Platelet Count 267      % Neutrophils 46      % Lymphocytes 41      % Monocytes 11      % Eosinophils 1      % Basophils 1      % Immature Granulocytes 0      NRBCs per 100 WBC 0      Absolute Neutrophils 2.7      Absolute Lymphocytes 2.4      Absolute Monocytes 0.6      Absolute Eosinophils 0.1      Absolute Basophils 0.0      Absolute Immature Granulocytes 0.0      Absolute NRBCs 0.0           DISCHARGE MEDS  New Prescriptions    No medications on file     Felicity Monroe MD  [facility] EMERGENCY ROOM  [address] [address] [phone]

## 2025-03-13 NOTE — ED PROVIDER NOTES
"[facility] EMERGENCY ROOM   ED Mental Health Observation - Daily Note for 3/13/2025    Na De León is a 24 year old female currently boarding in the ED while awaiting placement for Mental health crisis.  Please see the initial H&P for this patient's presentation, workup, and disposition plan.     Hold Status:  Patient is Voluntary, but holdable    Plan:  In brief, the patient's presentation is notable for suicidal ideation with plan to overdose on medication. This was the 2nd day in a row the patient has presented to the ER for suicidal ideation. DEC recommended evaluation by psychiatrist.    Patient is awaiting  mental health evaluation by psychiatrist    Interim History:  There were no significant events since last note.    Physical Exam:  /73   Pulse 99   Temp 97.9  F (36.6  C) (Temporal)   Resp 16   Ht 1.6 m (5' 3\")   Wt 59 kg (130 lb)   LMP 02/18/2025 (Approximate)   SpO2 98%   BMI 23.03 kg/m    No respiratory distress, on room air   Well perfused  Behavior appropriate    Medications provided prior to my care:  Medications   buPROPion (WELLBUTRIN XL) 24 hr tablet 150 mg (150 mg Oral $Given 3/13/25 0754)   hydrOXYzine HCl (ATARAX) tablet 25 mg (has no administration in time range)   acetaminophen (TYLENOL) tablet 650 mg (has no administration in time range)   escitalopram (LEXAPRO) tablet 10 mg (10 mg Oral $Given 3/13/25 0754)   spironolactone (ALDACTONE) tablet 50 mg (50 mg Oral $Given 3/13/25 0754)   LORazepam (ATIVAN) tablet 2 mg (2 mg Oral $Given 3/13/25 0307)       Laboratory (reviewed and interpreted):  Labs Ordered and Resulted from Time of ED Arrival to Time of ED Departure   COMPREHENSIVE METABOLIC PANEL - Abnormal       Result Value    Sodium 139      Potassium 3.6      Carbon Dioxide (CO2) 18 (*)     Anion Gap 16 (*)     Urea Nitrogen 12.6      Creatinine 0.82      GFR Estimate >90      Calcium 9.6      Chloride 105      Glucose 99      Alkaline " "Phosphatase 31 (*)     AST 19      ALT 9      Protein Total 6.4      Albumin 4.2      Bilirubin Total 0.3     ACETAMINOPHEN LEVEL - Abnormal    Acetaminophen <5.0 (*)    ETHYL ALCOHOL LEVEL - Normal    Alcohol ethyl <0.01     HCG QUALITATIVE PREGNANCY - Normal    hCG Serum Qualitative Negative     CBC WITH PLATELETS AND DIFFERENTIAL    WBC Count 5.8      RBC Count 4.38      Hemoglobin 13.4      Hematocrit 39.1      MCV 89      MCH 30.6      MCHC 34.3      RDW 11.7      Platelet Count 267      % Neutrophils 46      % Lymphocytes 41      % Monocytes 11      % Eosinophils 1      % Basophils 1      % Immature Granulocytes 0      NRBCs per 100 WBC 0      Absolute Neutrophils 2.7      Absolute Lymphocytes 2.4      Absolute Monocytes 0.6      Absolute Eosinophils 0.1      Absolute Basophils 0.0      Absolute Immature Granulocytes 0.0      Absolute NRBCs 0.0         ED Course:  6:02 AM The patient was signed out to me by Dr. Monroe  10:21 AM I spoke with [facility], who re-evaluated pt.  She's able to contract for safety, no current SI.  She and family want pt to go home, has outpatient primary therapist/psychiatrist follow up.    10:25 AM I met with the patient and family here and discussed plan with care team. Feel very good about discharge, outpatient follow up.    [facility] EMERGENCY ROOM   ED Mental Health Observation - Discharge Note (Complete)    Na De León is boarding in the ED after undergoing evaluation for Mental health crisis  Please see the initial H&P for this patient's presentation, workup, and disposition plan.    Interim History:  There were no significant events since the last note    Physical Exam:  /73   Pulse 99   Temp 97.9  F (36.6  C) (Temporal)   Resp 16   Ht 1.6 m (5' 3\")   Wt 59 kg (130 lb)   LMP 02/18/2025 (Approximate)   SpO2 98%   BMI 23.03 kg/m    No respiratory distress, on room air   Well perfused  Behavior appropriate    Medications " provided prior to my care:  Medications   buPROPion (WELLBUTRIN XL) 24 hr tablet 150 mg (150 mg Oral $Given 3/13/25 1894)   hydrOXYzine HCl (ATARAX) tablet 25 mg (has no administration in time range)   acetaminophen (TYLENOL) tablet 650 mg (has no administration in time range)   escitalopram (LEXAPRO) tablet 10 mg (10 mg Oral $Given 3/13/25 3406)   spironolactone (ALDACTONE) tablet 50 mg (50 mg Oral $Given 3/13/25 9380)   LORazepam (ATIVAN) tablet 2 mg (2 mg Oral $Given 3/13/25 5681)       Laboratory (reviewed and interpreted):  Labs Ordered and Resulted from Time of ED Arrival to Time of ED Departure   COMPREHENSIVE METABOLIC PANEL - Abnormal       Result Value    Sodium 139      Potassium 3.6      Carbon Dioxide (CO2) 18 (*)     Anion Gap 16 (*)     Urea Nitrogen 12.6      Creatinine 0.82      GFR Estimate >90      Calcium 9.6      Chloride 105      Glucose 99      Alkaline Phosphatase 31 (*)     AST 19      ALT 9      Protein Total 6.4      Albumin 4.2      Bilirubin Total 0.3     ACETAMINOPHEN LEVEL - Abnormal    Acetaminophen <5.0 (*)    ETHYL ALCOHOL LEVEL - Normal    Alcohol ethyl <0.01     HCG QUALITATIVE PREGNANCY - Normal    hCG Serum Qualitative Negative     CBC WITH PLATELETS AND DIFFERENTIAL    WBC Count 5.8      RBC Count 4.38      Hemoglobin 13.4      Hematocrit 39.1      MCV 89      MCH 30.6      MCHC 34.3      RDW 11.7      Platelet Count 267      % Neutrophils 46      % Lymphocytes 41      % Monocytes 11      % Eosinophils 1      % Basophils 1      % Immature Granulocytes 0      NRBCs per 100 WBC 0      Absolute Neutrophils 2.7      Absolute Lymphocytes 2.4      Absolute Monocytes 0.6      Absolute Eosinophils 0.1      Absolute Basophils 0.0      Absolute Immature Granulocytes 0.0      Absolute NRBCs 0.0         ED Course:  I discussed case with DEC , ED charge RN  I rounded on the patient    Impression/Plan:  Plan of care discussed at daily meeting; escalation of care considered.    Continue current plan for mental health treatment and placement, please see DEC  note for further details.    Upon reevaluation and discussion with DEC , we believe patient has shown sufficient improvement and thus will be Discharged.    EMERGENCY DEPARTMENT OBSERVATION status ended at 10:38 AM 3/13/2025    Discharge Management Time: > or equal to 30 minutes    1. Suicidal ideation          Impression/Plan:  1. Suicidal ideation        DO Lui Maza Daniel E, MD  03/13/25 1038

## 2025-03-13 NOTE — ED NOTES
Pt was asleep when RN entered room, awakened for meds. Pt states she slept good last night, denies having SI thoughts while being here in ED.  Pt cooperative with meds, declined needing PRN for anxiety. Pt remains on 1:1 for safety and at bedside. Pt wanted light back down when RN done. Breakfast tray at bedside. Will continue to monitor.  RN did room safety check at start of shift and unsafe items removed from drawers

## 2025-03-13 NOTE — CONSULTS
Initial Psychiatric Consult   Consult date: March 13, 2025    Psychiatry discussed patient case with [facility]. They report patient does not wish to meet with psychiatry and plans to follow-up with their OP provider. Reports feeling safe to discharge.         Page me or re-consult psychiatry as needed (psychiatry is signing off).       Ina Black, HEATH, APRN  Consult/Liaison Psychiatry  [facility]   Contact information available via [facility] Paging/Directory.  If I am not available, please call [facility] intake ([phone])

## 2025-03-14 PROBLEM — Z78.9 KNOWN HEALTH PROBLEMS: NONE: Status: ACTIVE | Noted: 2025-03-14

## 2025-03-14 NOTE — TELEPHONE ENCOUNTER
The patient called in to request a fill of lorazepam that will last over the weekend. She states that she was recently in the [facility] ED for 2 days with mental health issues, but she was discharged with a plan for psychiatry follow-up. She is to be established with a psychiatrist who she is scheduled to see on Monday with [facility]. Until then she is requesting more PRN lorazepam as she is out of her 3/11 supply and the psychiatrist office stated they could not prescribe her any prior to her being established on Monday.     The patient is currently safe, but without the medications she believes the weekend would be difficult to get through to her appointment. Routing high priority to the patient's PCP and home clinic. Please fill this request if it is appropriate and contact the patient. Medication pended with 12 tablets for every 6 hour PRN use.     Louie Muñoz RN  [facility]

## 2025-03-16 NOTE — PROGRESS NOTES
Colposcopy Procedure Note    History:  Na De León is a patient of Frances Childers that presents for colposcopy for ASCUS       Consent: Affirmation of informed consent signed and scanned into medical record. Risks, benefits and alternatives discussed. Patient's questions were elicited and answered.  Procedure safety checklist was completed:  Yes  Time Out (Pause for the Cause) completed: Yes    Labs:   UPT:  Negative    Procedure:  Patient positioned for colposcopy.   SCJ seen entirely? Yes  Acetic acid applied - acetowhite changes between 4-8:00  Lugol's applied - partial uptake between 5-7:00  Blue/green light applied - no unusual blood vessels  endocervical curettage performed  cervical biopsies taken at 6 o'clock  specimen labelled and sent to pathology  hemostasis achieved with Monsel's solution    Hedley was satisfactory with biopsy and ECC  EBL: minimal    Findings:   Cervix  Color:  One (1) Points - Shiny, off-white.  Intermediate white.  Margin: Zero (0) Points - Condylomatous or micropapillary contour.  Indistinct borders.   Vessels: Zero (0) Points - Fine, punctuation or fine mosaic.  Uniform, fine caliber, nondilated capillary loops.  Narrow intercapillary distance.  Lugol's: One (1) Point - Parital iodine uptake.  Variegated, tortoise-shell appearance.  Total Pts: 2      (0-2 mild, 3-5 mod, 6-8 severe)     Colposcopic Impression: Dysplasia Mild    Tolerance:   Patient tolerated procedure well    Plan:  Follow up for biopsy results.  Discharge instructions discussed including RTC or call if bleeding >1pph, fever, abdominal pain or foul smelling discharge.     Frances Don MD  [facility]

## 2025-03-17 RX ORDER — LORAZEPAM 0.5 MG/1
0.5 TABLET ORAL EVERY 6 HOURS PRN
Qty: 12 TABLET | Refills: 0 | OUTPATIENT
Start: 2025-03-17

## 2025-03-19 ENCOUNTER — TELEPHONE (OUTPATIENT)
Dept: BEHAVIORAL HEALTH | Facility: CLINIC | Age: 25
End: 2025-03-19
Payer: COMMERCIAL

## 2025-03-19 NOTE — TELEPHONE ENCOUNTER
LVM for pt re: rescheduling virtual diagnostic assessment on 3/20/2025 @ 11:30am.  Provided direct phone # to call to reschedule.    Sarai HOLCOMB, LICSW

## 2025-03-20 ENCOUNTER — OFFICE VISIT (OUTPATIENT)
Dept: FAMILY MEDICINE | Facility: CLINIC | Age: 25
End: 2025-03-20
Payer: COMMERCIAL

## 2025-03-20 VITALS
DIASTOLIC BLOOD PRESSURE: 68 MMHG | TEMPERATURE: 97.4 F | RESPIRATION RATE: 16 BRPM | WEIGHT: 127 LBS | HEART RATE: 75 BPM | SYSTOLIC BLOOD PRESSURE: 104 MMHG | OXYGEN SATURATION: 100 % | HEIGHT: 63 IN | BODY MASS INDEX: 22.5 KG/M2

## 2025-03-20 DIAGNOSIS — R87.610 ATYPICAL SQUAMOUS CELLS OF UNDETERMINED SIGNIFICANCE (ASCUS) ON PAPANICOLAOU SMEAR OF CERVIX: Primary | ICD-10-CM

## 2025-03-20 LAB — HCG UR QL: NEGATIVE

## 2025-03-24 LAB
PATH REPORT.COMMENTS IMP SPEC: NORMAL
PATH REPORT.FINAL DX SPEC: NORMAL
PATH REPORT.GROSS SPEC: NORMAL
PATH REPORT.MICROSCOPIC SPEC OTHER STN: NORMAL
PATH REPORT.RELEVANT HX SPEC: NORMAL
PHOTO IMAGE: NORMAL

## 2025-03-25 ENCOUNTER — VIRTUAL VISIT (OUTPATIENT)
Dept: PSYCHOLOGY | Facility: CLINIC | Age: 25
End: 2025-03-25
Payer: COMMERCIAL

## 2025-03-25 DIAGNOSIS — F41.1 GENERALIZED ANXIETY DISORDER: Primary | ICD-10-CM

## 2025-03-25 DIAGNOSIS — F33.1 MAJOR DEPRESSIVE DISORDER, RECURRENT EPISODE, MODERATE (H): ICD-10-CM

## 2025-03-25 PROCEDURE — 90791 PSYCH DIAGNOSTIC EVALUATION: CPT | Mod: 95

## 2025-03-25 ASSESSMENT — ANXIETY QUESTIONNAIRES
1. FEELING NERVOUS, ANXIOUS, OR ON EDGE: MORE THAN HALF THE DAYS
7. FEELING AFRAID AS IF SOMETHING AWFUL MIGHT HAPPEN: SEVERAL DAYS
8. IF YOU CHECKED OFF ANY PROBLEMS, HOW DIFFICULT HAVE THESE MADE IT FOR YOU TO DO YOUR WORK, TAKE CARE OF THINGS AT HOME, OR GET ALONG WITH OTHER PEOPLE?: SOMEWHAT DIFFICULT
GAD7 TOTAL SCORE: 11
IF YOU CHECKED OFF ANY PROBLEMS ON THIS QUESTIONNAIRE, HOW DIFFICULT HAVE THESE PROBLEMS MADE IT FOR YOU TO DO YOUR WORK, TAKE CARE OF THINGS AT HOME, OR GET ALONG WITH OTHER PEOPLE: SOMEWHAT DIFFICULT
4. TROUBLE RELAXING: MORE THAN HALF THE DAYS
5. BEING SO RESTLESS THAT IT IS HARD TO SIT STILL: SEVERAL DAYS
6. BECOMING EASILY ANNOYED OR IRRITABLE: SEVERAL DAYS
2. NOT BEING ABLE TO STOP OR CONTROL WORRYING: MORE THAN HALF THE DAYS
3. WORRYING TOO MUCH ABOUT DIFFERENT THINGS: MORE THAN HALF THE DAYS
GAD7 TOTAL SCORE: 11
GAD7 TOTAL SCORE: 11
7. FEELING AFRAID AS IF SOMETHING AWFUL MIGHT HAPPEN: SEVERAL DAYS

## 2025-03-25 ASSESSMENT — PATIENT HEALTH QUESTIONNAIRE - PHQ9
SUM OF ALL RESPONSES TO PHQ QUESTIONS 1-9: 18
10. IF YOU CHECKED OFF ANY PROBLEMS, HOW DIFFICULT HAVE THESE PROBLEMS MADE IT FOR YOU TO DO YOUR WORK, TAKE CARE OF THINGS AT HOME, OR GET ALONG WITH OTHER PEOPLE: SOMEWHAT DIFFICULT
SUM OF ALL RESPONSES TO PHQ QUESTIONS 1-9: 18

## 2025-03-25 NOTE — PROGRESS NOTES
"    [facility]         PATIENT'S NAME: Na De León  PREFERRED NAME: Na  PRONOUNS:       MRN: [id number]  : 2000  ADDRESS: [address] [place] [address]   [facility] [address]  ACCT. NUMBER:  [id number]  DATE OF SERVICE: 3/25/25  START TIME: 8:00 AM  END TIME: 8:41 AM  PREFERRED PHONE: [phone]  May we leave a program related message: Yes  EMERGENCY CONTACT: was obtained Mekhi De León [phone] .  SERVICE MODALITY:  Video Visit:      Provider verified identity through the following two step process.  Patient provided:  Patient  and Patient address    Telemedicine Visit: The patient's condition can be safely assessed and treated via synchronous audio and visual telemedicine encounter.      Reason for Telemedicine Visit: Patient convenience (e.g. access to timely appointments / distance to available provider)    Originating Site (Patient Location): Patient's home    Distant Site (Provider Location): Provider Remote Setting- Home Office    Consent:  The patient/guardian has verbally consented to: the potential risks and benefits of telemedicine (video visit) versus in person care; bill my insurance or make self-payment for services provided; and responsibility for payment of non-covered services.     Patient would like the video invitation sent by:  My Chart    Mode of Communication:  Video Conference via [facility]      Distant Location (Provider):  Off-site    As the provider I attest to compliance with applicable laws and regulations related to telemedicine.    UNIVERSAL ADULT Mental Health DIAGNOSTIC ASSESSMENT    Identifying Information:  Patient is a 24 year old,   individual.  Patient was referred for an assessment by [facility].  Patient attended the session alone.    Chief Complaint:   The reason for seeking services at this time is: \"Depression\".  The problem(s) began 25.    Patient has attempted to resolve these concerns in the past through therapy and " "psychiatry .    Per DEC Assessment 3/11/25 by Joycelyn LINDSAY: \"Na De León presents to the ED with family/friends. Patient is presenting to the ED for the following concerns: Suicidal ideation, Depression, Worsening psychosocial stress. Factors that make the mental health crisis life threatening or complex are: Pt arrives to ED with her mother for concerns of suicidal thoughts with plan for the past few weeks. Plan is to overdose on the pills she has at home. Pt identifies stressors of job stress, family stress, health bills, and break up with boyfriend three months ago. She reports this is a similar formula for when she previously presented to the ED for mental health concerns in 2022. She reports that she tends to feel things more deeply than others around her and so it's more difficult for her to move on from adverse situations. Pt reports her SI is a 3/5, and although she has thought of a plan, she isn't sure if she has intent. She reports she mainly has these thoughts at night when she is alone, but is able to distract herself during the day. During assessment, pt appeared somewhat irritable with mom and . She reported that she has to be to work tomorrow morning and asked if there was a pill she could have to \"make this better tonight\". She then expresses frustration that there is no \"immediate fix\", and then her mother endorsed frustration that pt stopped her medications.  Pt denies SIB, HI, changes in sleep or energy, or any signs of psychosis or bel.. \"     Per DEC Assessment 3/13/25 by Nettie LINDSAY: \"Na De León presents to the ED with family/friends. Patient is presenting to the ED for the following concerns: Suicidal ideation, Depression, Worsening psychosocial stress, Anxiety. Factors that make the mental health crisis life threatening or complex are: easily overwhelmed, seems to have trouble organizing more than a couple of things at once; impulsive.\"   " "    Social/Family History:  Patient reported they grew up in [facility]  .  They were raised by biological parents  .  Parents were always together.  Patient reported that their childhood was \"I was well taken care of\".  Patient described their current relationships with family of origin as \"My family is very supportive\" and\" I have a not normal relationship with one of my sisters\".     The patient describes their cultural background as .  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  Contextual influences on patient's health include: Family Factors strained relationship with a sister .    These factors will be addressed in the Preliminary Treatment plan. Patient identified their preferred language to be English. Patient reported they does not need the assistance of an  or other support involved in therapy.     Patient reported had no significant delays in developmental tasks.   Patient's highest education level was college graduate  .  Patient identified the following learning problems: none reported.  Modifications will be used to assist communication in therapy.  Patient reports they are  able to understand written materials.    Patient reported the following relationship history recently experienced a break up.  Patient's current relationship status is single for 3 months.   Patient identified their sexual orientation as heterosexual.  Patient reported having   child(jazmin). Patient identified parents; siblings; pets as part of their support system.  Patient identified the quality of these relationships as good,  .      Patient's current living/housing situation involves staying in own home/apartment.  The immediate members of family and household include  2 roommates and a dog and they report that housing is stable.    Patient is currently employed fulltime.  Patient reports " their finances are obtained through employment. Patient does identify finances as a current stressor.      Patient reported that they have not been involved with the legal system.    Patient does not report being under probation/ parole/ jurisdiction. .    Patient's Strengths and Limitations:  Patient identified the following strengths or resources that will help them succeed in treatment: commitment to health and well being, friends / good social support, family support, insight, intelligence, positive work environment, and motivation. Things that may interfere with the patient's success in treatment include: financial hardship and physical health concerns.     Assessments:  The following assessments were completed by patient for this visit:  PHQ9:       12/12/2022    10:30 AM 12/28/2022     5:03 PM 12/7/2023     6:04 PM 7/9/2024     3:20 PM 1/22/2025     9:33 AM 3/19/2025     9:29 AM 3/25/2025     7:58 AM   PHQ-9 SCORE   PHQ-9 Total Score MyChart 20 (Severe depression) 3 (Minimal depression) 2 (Minimal depression) 4 (Minimal depression) 0 15 (Moderately severe depression) 18 (Moderately severe depression)   PHQ-9 Total Score 20 3 2 4 0  15  18        Patient-reported     GAD7:       9/24/2020     9:00 AM 3/9/2021     9:00 AM 12/12/2022     9:48 AM 12/8/2023     7:36 AM 7/9/2024     3:21 PM 3/25/2025     7:58 AM   FAY-7 SCORE   Total Score   18 (severe anxiety)  1 (minimal anxiety) 11 (moderate anxiety)   Total Score 8 1 18 0 1 11        Patient-reported     CAGE-AID:       12/12/2022     9:46 AM 12/12/2022    11:00 AM   CAGE-AID Total Score   Total Score     Total Score MyChart         Information is confidential and restricted. Go to Review Flowsheets to unlock data.     PROMIS 10-Global Health (only subscores and total score):       12/12/2022     9:51 AM 3/19/2025    10:24 AM 3/25/2025     7:59 AM   PROMIS-10 Scores Only   Global Mental Health Score   10    Global Physical Health Score   16    PROMIS TOTAL -  SUBSCORES   26        Information is confidential and restricted. Go to Review Flowsheets to unlock data.    Patient-reported     [place] Suicide Severity Rating Scale (Lifetime/Recent)      12/12/2022    11:00 AM 12/19/2022    11:00 AM 3/11/2025     6:34 PM 3/11/2025     7:36 PM 3/12/2025     9:56 PM 3/13/2025     2:26 AM 3/13/2025     7:59 AM   [place] Suicide Severity Rating (Lifetime/Recent)   Q1 Wish to be Dead (Lifetime)    Yes  Yes    Q2 Non-Specific Active Suicidal Thoughts (Lifetime)    No  Yes    Q1 Wished to be Dead (Past Month) yes yes 1-->yes 1-->yes 1-->yes 1-->yes 1-->yes   Q2 Suicidal Thoughts (Past Month) yes yes 1-->yes 1-->yes 1-->yes 1-->yes 1-->yes   Q3 Suicidal Thought Method yes yes 1-->yes 1-->yes 1-->yes 1-->yes 1-->yes   Q4 Suicidal Intent without Specific Plan no no 0-->no 1-->yes 0-->no 0-->no 0-->no   Q5 Suicide Intent with Specific Plan yes no 1-->yes  1-->yes 0-->no 1-->yes   Q6 Suicide Behavior (Lifetime) no no 1-->yes 1-->yes 0-->no 0-->no    If yes to Q6, within past 3 months? yes no 0-->no 1-->yes  0-->no    Level of Risk per Screen high risk moderate risk high risk high risk high risk moderate risk high risk   3. Active Suicidal Ideation with any Methods (Not Plan) Without Intent to Act (Lifetime)    Y  Y    4. Active Suicidal Ideation with Some Intent to Act, Without Specific Plan (Lifetime)    Y  Y    5. Active Suicidal Ideation with Specific Plan and Intent (Lifetime)    N  N    Most Severe Ideation Rating (Lifetime)    3      Most Severe Ideation Rating (Past 1 Month)    3      Frequency (Lifetime)    4      Frequency (Past 1 Month)    4      Duration (Lifetime)    2      Duration (Past 1 Month)    2      Controllability (Lifetime)    3      Controllability (Past 1 Month)    3      Deterrents (Past 1 Month)    1      Reasons for Ideation (Past 1 Month)    5      Actual Attempt (Lifetime)      N    Actual Attempt (Past 3 Months)    N  N    Has subject engaged in non-suicidal  self-injurious behavior? (Lifetime)      N    Has subject engaged in non-suicidal self-injurious behavior? (Past 3 Months)    N  N    Interrupted Attempts (Past 3 Months)    N      Aborted or Self-Interrupted Attempt (Past 3 Months)    N      Preparatory Acts or Behavior (Past 3 Months)    N      Calculated C-SSRS Risk Score (Lifetime/Recent)    Moderate Risk  Moderate Risk        LOCUS Worksheet     Name: Na De León                                         MRN: [id number]    : 2000    Gender:  female      PMI:        Provider Name:  Madhu Lincoln M.S. [facility]          Provider NPI:  [id number]    Actual level of Care Provided:  Assessment and referral    Service(s) receiving or referred to:  IOP/DTP    Reason for Variance: Patient's mental health is negatively impacting their ability to function well.  Patient needs a higher level of care to stabilize mental health symptoms.  Patient will benefit from more support in the community.       Rating completed by: Madhu Lincoln M.S. [facility]      I. Risk of Harm:   3      Moderate Risk of Harm    II. Functional Status:   3      Moderate Impairment    III. Co-Morbidity:   1      No Co-Morbidity    IV - A. Recovery Environment - Level of Stress:   3      Moderately Stress Environment    IV - B. Recovery Environment - Level of Support:   3      Limited Support in Environment    V. Treatment and Recovery History:   3      Moderate to Equivocal Response to Treatment and Recovery Management    VI. Engagement and Recovery Project:   2      Positive Engagement and Recovery       18 Composite Score    Level of Care Recommendation:   17 to 19       High Intensity Community Based Services      Personal and Family Medical History:  Patient does not report a family history of mental health concerns.  Patient reports family history includes Alzheimer Disease in her maternal grandfather; Anxiety Disorder in her mother, sister, and sister; Basal cell carcinoma in her  mother; Chronic Obstructive Pulmonary Disease in her maternal grandmother; Depression in her maternal grandfather; Hyperlipidemia in her father; Hypertension in her maternal grandmother; No Known Problems in her sister; Pacemaker in her paternal grandmother; Prostate Cancer in her father, maternal grandfather, and paternal grandfather; Rheumatoid Arthritis in her maternal grandmother..     Patient does report Mental Health Diagnosis and/or Treatment.  Patient reported the following previous diagnoses which include(s):   .  Patient reported symptoms began at age 10.  Patient has received mental health services in the past:    therapy and psychiatry .  Psychiatric Hospitalizations:none  Patient denies a history of civil commitment.      Currently, patient    is receiving other mental health services.  These include Therapy and Psychiatry for the past 2 years.       Patient has had a physical exam to rule out medical causes for current symptoms.  Date of last physical exam was within the past year. Client was encouraged to follow up with PCP if symptoms were to develop. The patient has a [place] Primary Care Provider, who is named Frances Don..  Patient reports the following current medical concerns: patient continues to be monitored by [facility] due to her past cancer diagnosis .  Patient denies any issues with pain..   There are not significant appetite / nutritional concerns / weight changes.   Patient does report a history of head injury / trauma / cognitive impairment.  History of a concussion    Patient reports current meds as:   Current Outpatient Medications   Medication Sig Dispense Refill    butalbital-acetaminophen-caffeine (ESGIC) -40 MG tablet Take 1 tablet by mouth every 6 hours as needed for headaches. 30 tablet 1    escitalopram (LEXAPRO) 20 MG tablet Take 20 mg by mouth daily      LORazepam (ATIVAN) 0.5 MG tablet Take 1 tablet (0.5 mg) by mouth every 6 hours as needed for anxiety. 6  tablet 0    spironolactone (ALDACTONE) 50 MG tablet Take 150 mg by mouth daily       Current Facility-Administered Medications   Medication Dose Route Frequency Provider Last Rate Last Admin    etonogestrel (NEXPLANON) subdermal implant 68 mg  1 each Subdermal Once Frances Don MD       Trazadone as needed.    Medication Adherence:  Patient reports taking.  taking psychiatric medications as prescribed.    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         Current Mental Status Exam:   Appearance:  Appropriate    Eye Contact:  Good   Psychomotor:  Normal       Gait / station:  no problem  Attitude / Demeanor: Cooperative   Speech      Rate / Production: Normal/ Responsive      Volume:  Normal  volume      Language:  intact  Mood:   Anxious   Affect:   Constricted    Thought Content: Clear   Thought Process: Coherent  Goal Directed  Logical       Associations: No loosening of associations  Insight:   Good   Judgment:  Intact   Orientation:  All  Attention/concentration: Good    Substance Use:   Patient did not report a family history of substance use concerns; see medical history section for details.  Patient has not received chemical dependency treatment in the past.  Patient has not ever been to detox.      Patient is not currently receiving any chemical dependency treatment. Patient reported the following problems as a result of their substance use: none.    Patient reports social drinking, have 2 alcoholic beverages at a time, every other week.  Patient endorses having caffeine daily, one drink per day.      Substance Use: No symptoms    Based  on the CAGE score of 0 and clinical interview there  are not indications of drug or alcohol abuse.    Significant Losses / Trauma / Abuse / Neglect Issues:   Patient did not  serve in the .  There are not indications or report of significant loss, trauma, abuse or neglect issues related to: major medical problems patient experience of cancer .  Patient has not been a victim of exploitation.  Concerns for possible neglect are not present.     Safety Assessment:   Patient denies current or past homicidal ideation and behaviors.  Patient reports current/recent suicide ideation, plans, intent, or attempts.    See C-SSRS for more detail and safety plan below.  Patient denies current or past self-injurious behaviors.  Patient denied risk behaviors associated with substance use.  Patient denies any high risk behaviors associated with mental health symptoms.  Patient denied current or past personal safety concerns.    Patient denies past of current/recent assaultive behaviors.    Patient denied a history of sexual assault behaviors.     Patient reports there are not   firearms in the house.    Patient reports the following protective factors: strong bond to family unit, community, job, school, etc and lives in a responsibly safe environment daily obligations; other    Risk Plan:  See Recommendations for Safety and Risk Management Plan    Review of Symptoms per patient report:   Depression: Lack of interest or pleasure in doing things, Feeling sad, down, or depressed, Feelings of hopelessness, Change in energy level, Change in sleep, Difficulties concentrating, Suicidal ideation, Feelings of helplessness, and Withdrawn  Olga:  No Symptoms  Psychosis: No Symptoms  Anxiety: Excessive worry, Nervousness, Sleep disturbance, Ruminations, and Poor concentration  Panic:  No symptoms  Post Traumatic Stress Disorder:  Impaired functioning   Eating Disorder: No Symptoms  ADD / ADHD:  No symptoms  Conduct Disorder: No  symptoms  Autism Spectrum Disorder: No symptoms  Obsessive Compulsive Disorder: No Symptoms  Personality Disorders:  No Symptoms    Patient reports the following compulsive behaviors and treatment history: None.      Diagnostic Criteria:   Generalized Anxiety Disorder  A. Excessive anxiety and worry about a number of events or activities (such as work or school performance).   B. The person finds it difficult to control the worry.  C. Select 3 or more symptoms (required for diagnosis). Only one item is required in children.   - Restlessness or feeling keyed up or on edge.    - Being easily fatigued.    - Difficulty concentrating or mind going blank.    - Irritability.    - Muscle tension.    - Sleep disturbance (difficulty falling or staying asleep, or restless unsatisfying sleep).   D. The focus of the anxiety and worry is not confined to features of an Axis I disorder.  E. The anxiety, worry, or physical symptoms cause clinically significant distress or impairment in social, occupational, or other important areas of functioning.   F. The disturbance is not due to the direct physiological effects of a substance (e.g., a drug of abuse, a medication) or a general medical condition (e.g., hyperthyroidism) and does not occur exclusively during a Mood Disorder, a Psychotic Disorder, or a Pervasive Developmental Disorder. Major Depressive Disorder  CRITERIA (A-C) REPRESENT A MAJOR DEPRESSIVE EPISODE - SELECT THESE CRITERIA  A) Recurrent episode(s) - symptoms have been present during the same 2-week period and represent a change from previous functioning 5 or more symptoms (required for diagnosis)   - Depressed mood. Note: In children and adolescents, can be irritable mood.     - Diminished interest or pleasure in all, or almost all, activities.    - Fatigue or loss of energy.    - Feelings of worthlessness or excessive guilt.    - Diminished ability to think or concentrate, or indecisiveness.    - Recurrent thoughts of  death (not just fear of dying), recurrent suicidal ideation without a specific plan, or a suicide attempt or a specific plan for committing suicide.   B) The symptoms cause clinically significant distress or impairment in social, occupational, or other important areas of functioning  C) The episode is not attributable to the physiological effects of a substance or to another medical condition  D) The occurence of major depressive episode is not better explained by other thought / psychotic disorders  E) There has never been a manic episode or hypomanic episode    Functional Status:  Patient reports the following functional impairments:  chronic disease management, health maintenance, money management, organization, self-care, and work / vocational responsibilities.     Adult  Programmatic care:  Current LOCUS was assigned and patient needs the following level of care based on score 18.  1. Does the patient have a history of vulnerability such as being teased, picked on, or other indications of potential safety issues with other residents?  No    2. Does this patient have a history of being the victim of abuse? No history of abuse reported or documented.    3. Does this patient have a history of victimizing others? No     4. Does the patient have a history of boundary violations?  No.    5. Does the patient have a history of other sexual acting out behaviors (e.g grooming)?   No    6. Does the patient have a history of threats to self or others? Fire setting, running away or other self-injurious behaviors?    No    7. Does the patient s history indicate the need for special precautions or particular staffing patterns in the facility?  No      NOTE: If this screening indicates that the patient is at risk to harm self or others, notify staff at referral location.    Clinical Summary:  1. Psychosocial Factors:  Medical complexities, Occupational Issues, Financial strain.  Cultural and Contextual Factors: Patient is a  24 year old female who has been struggling with a variety of stressors of the past 3 months. She experienced family conflict, a break uo, and has had increased stress at work leading to increased anxiety and depression. Nicky endorses ongoing suicidal thoughts, but denies plan or intent at this time. Hina recently increased her outpatinet supports to twice weekly therapy, and psychiatry; but continues to endorse ongoing symptoms of both anxiety and depression.  2. Principal DSM5 Diagnoses  (Sustained by DSM5 Criteria Listed Above):   300.02 (F41.1) Generalized Anxiety Disorder.  3. Other Diagnoses that is relevant to services:   296.32 (F33.1) Major Depressive Disorder, Recurrent Episode, Moderate _.  4. Provisional Diagnosis:  296.32 (F33.1) Major Depressive Disorder, Recurrent Episode, Moderate _  300.02 (F41.1) Generalized Anxiety Disorder as evidenced by increased anxiety and depression impacting patients ability to function at her baseline  5. Prognosis: Return to Baseline Functioning, Expect Improvement, and Relieve Acute Symptoms.  6. Likely consequences of symptoms if not treated: If left untreated patient is likely to experience increased symptoms and be at an elevated risk of harm to herself.  7. Patient strengths include:  caring, committed to sobriety, creative, educated, empathetic, employed, goal-focused, good listener, has a previous history of therapy, insightful, intelligent, and motivated .     Recommendations:     1. Plan for Safety and Risk Management:   Safety and Risk: Recommended that patient call 911 or go to the local ED should there be a change in any of these risk factors        Report to child / adult protection services was NA.     2. Patient's identified no francois / Hinduism / spiritual influences that need to be incorporated into care.     3. Initial Treatment will focus on:    Depressed Mood -    Anxiety -   .     4. Resources/Service Plan:    services are not  "indicated.   Modifications to assist communication are not indicated.   Additional disability accommodations are not indicated.      5. Collaboration:   Collaboration / coordination of treatment will be initiated with the following  support professionals: N/A     6.  Referrals:   The following referral(s) will be initiated: IOP/DTP.       A Release of Information has been obtained for the following: N/A.     Clinical Substantiation/medical necessity for the above recommendations:  Patient has presented to the emergency department twice over the past month for suicidal ideation despite having outpatient supports. Patient would benefit from a higher level of care as this is likely to help stabilize patients ongoing mental health symptoms.    7. TEGAN:    TEGAN:  Discussed the general effects of drugs and alcohol on health and well-being. Provider gave patient printed information about the  effects of chemical use on their health and well being. Recommendations:  none .     8. Records:   These were reviewed at time of assessment.   Information in this assessment was obtained from the medical record and  provided by patient who is a good historian.    Patient will have open access to their mental health medical record.    9.   Interactive Complexity: No    10. Safety Plan:   [facility] Safety Plan      Creation Date: 3/11/25 Last Update Date: 3/13/25      Step 1: Warning signs:    Warning Signs    feeling overwhelmed    health stressors    loss of partner due to conflict or break up      Step 2: Internal coping strategies - Things I can do to take my mind off my problems without contacting another person:    Strategies    going for walks    journaling    I can check out \"short, free Headspace,\" or \"short free mindfulness\" videos on [employer] to help manage anxiety      Step 3: People and social settings that provide distraction:       Places    work    hanging out with your roomates    taking your dog on walks      Step 4: " People whom I can ask for help during a crisis:    Name Contact Information    Maggie De León (mother)       Step 5: Professionals or agencies I can contact during a crisis:    Local Emergency Department Emergency Department Address Emergency Department Phone    [address] [address]        Suicide Prevention Lifeline Phone: Call or Text 259  Crisis Text Line: Text HOME to 890032     Step 6: Making the environment safer (plan for lethal means safety):   Plan was made in past for me to stay with your parents for a few nights so they can observe you and give you your medication doses.     Tomorrow morning: please call your psychiatrist to set up an appointment for medication adjustments.   Please also contact your therapist to ask about increasing your sessions to every other week    If your symptoms to not decrease, or they get worse, please return to the ED     Optional: What is most important to me and worth living for?:   My dog that is sitting next to me. Family, friends, and career.      [facility] Safety Plan. Janice Fine and Colt Morales. Used with permission of the authors.          Provider Name/ Credentials:  Madhu Lincoln M.S. [facility]  March 25, 2025

## 2025-04-17 ENCOUNTER — PATIENT OUTREACH (OUTPATIENT)
Dept: FAMILY MEDICINE | Facility: CLINIC | Age: 25
End: 2025-04-17
Payer: COMMERCIAL

## 2025-06-08 DIAGNOSIS — G44.209 TENSION-TYPE HEADACHE, NOT INTRACTABLE, UNSPECIFIED CHRONICITY PATTERN: ICD-10-CM

## 2025-06-08 RX ORDER — BUTALBITAL, ACETAMINOPHEN AND CAFFEINE 50; 325; 40 MG/1; MG/1; MG/1
1 TABLET ORAL EVERY 6 HOURS PRN
Qty: 60 TABLET | Refills: 0 | Status: SHIPPED | OUTPATIENT
Start: 2025-06-08

## 2025-06-09 NOTE — TELEPHONE ENCOUNTER
reviewed - last fill 1/23 - ok for refill    Frances Don MD  [facility]

## (undated) DEVICE — SU MONOCRYL 4-0 PS-2 27" UND Y426H

## (undated) DEVICE — DAVINCI XI DRIVER NDL MEGA SUTURECUT 8MM EXT 471309

## (undated) DEVICE — NDL INSUFFLATION 13GA 120MM C2201

## (undated) DEVICE — DAVINCI XI SEAL UNIVERSAL 5-8MM 470361

## (undated) DEVICE — DAVINCI XI GRASPER FENESTRATED TIP UP 8MM 470347

## (undated) DEVICE — DRSG TELFA 3X8" 1238

## (undated) DEVICE — STPL DAVINCI SUREFORM 60MM RELOAD BLUE 48360B

## (undated) DEVICE — DAVINCI XI REDUCER 8-12MM 470381

## (undated) DEVICE — DRAPE IOBAN INCISE 23X17" 6650EZ

## (undated) DEVICE — SU VICRYL 0 TIE 54" J608H

## (undated) DEVICE — DRAPE U SPLIT 74X120" 29440

## (undated) DEVICE — DAVINCI XI MONOPOLAR SCISSORS HOT SHEARS 8MM 470179

## (undated) DEVICE — SU VICRYL 3-0 SH 27" J316H

## (undated) DEVICE — DAVINCI XI ESU BIPOLAR 3MM ENDOWRIST FENESTRATED EXT 471205

## (undated) DEVICE — SU VICRYL 2-0 SH 27" UND J417H

## (undated) DEVICE — ENDO TROCAR FIRST ENTRY KII FIOS Z-THRD 05X100MM CTF03

## (undated) DEVICE — ENDO ACCESS PLATFORM GELPOINT MINI CNGL3

## (undated) DEVICE — TIP CAUTERY L HOOK 36CM E377336C

## (undated) DEVICE — SU VICRYL 3-0 SH 27" UND J416H

## (undated) DEVICE — SU PDS II 1 CTX 36" Z371T

## (undated) DEVICE — SU VICRYL 0 UR-6 27" J603H

## (undated) DEVICE — CATH TRAY FOLEY SURESTEP 16FR W/URNE MTR STLK LATEX A303316A

## (undated) DEVICE — DEVICE SUTURE PASSER 14GA WECK EFX EFXSP2

## (undated) DEVICE — ENDO POUCH UNIVERSAL RETRIEVAL SYSTEM INZII 12/15MM CD004

## (undated) DEVICE — STPL DAVINCI SUREFORM 60MM STR 480460

## (undated) DEVICE — PREP CHLORAPREP 26ML TINTED HI-LITE ORANGE 930815

## (undated) DEVICE — DAVINCI XI HANDPIECE ESU VESSEL SEALER 8MM EXT 480422

## (undated) DEVICE — DRAPE SHEET REV FOLD 3/4 9349

## (undated) DEVICE — DAVINCI XI DRAPE ARM 470015

## (undated) DEVICE — ENDO TROCAR CONMED AIRSEAL BLADELESS 08X120MM IAS8-120LP

## (undated) DEVICE — NDL BX 18GAX25CM MC1825

## (undated) DEVICE — DAVINCI XI DRAPE COLUMN 470341

## (undated) DEVICE — SU SILK 2-0 TIE 12X30" A305H

## (undated) DEVICE — WIPES FOLEY CARE SURESTEP PROVON DFC100

## (undated) DEVICE — ESU GROUND PAD ADULT REM W/15' CORD E7507DB

## (undated) DEVICE — DAVINCI HOT SHEARS TIP COVER  400180

## (undated) DEVICE — ADH SKIN CLOSURE PREMIERPRO EXOFIN 1.0ML 3470

## (undated) DEVICE — STPL DAVINCI SUREFORM 60MM RELOAD WHITE 48360W

## (undated) DEVICE — DAVINCI SEAL CANNULA AND STPL 12MM 470380

## (undated) RX ORDER — INDOCYANINE GREEN AND WATER 25 MG
KIT INJECTION
Status: DISPENSED
Start: 2022-04-12

## (undated) RX ORDER — SODIUM CHLORIDE, SODIUM LACTATE, POTASSIUM CHLORIDE, CALCIUM CHLORIDE 600; 310; 30; 20 MG/100ML; MG/100ML; MG/100ML; MG/100ML
INJECTION, SOLUTION INTRAVENOUS
Status: DISPENSED
Start: 2022-04-12

## (undated) RX ORDER — FENTANYL CITRATE 50 UG/ML
INJECTION, SOLUTION INTRAMUSCULAR; INTRAVENOUS
Status: DISPENSED
Start: 2022-04-12

## (undated) RX ORDER — ACETAMINOPHEN 325 MG/1
TABLET ORAL
Status: DISPENSED
Start: 2022-04-12

## (undated) RX ORDER — PROPOFOL 10 MG/ML
INJECTION, EMULSION INTRAVENOUS
Status: DISPENSED
Start: 2022-04-12

## (undated) RX ORDER — DEXAMETHASONE SODIUM PHOSPHATE 4 MG/ML
INJECTION, SOLUTION INTRA-ARTICULAR; INTRALESIONAL; INTRAMUSCULAR; INTRAVENOUS; SOFT TISSUE
Status: DISPENSED
Start: 2022-04-12

## (undated) RX ORDER — HYDROMORPHONE HCL IN WATER/PF 6 MG/30 ML
PATIENT CONTROLLED ANALGESIA SYRINGE INTRAVENOUS
Status: DISPENSED
Start: 2022-04-12

## (undated) RX ORDER — ONDANSETRON 2 MG/ML
INJECTION INTRAMUSCULAR; INTRAVENOUS
Status: DISPENSED
Start: 2022-04-12

## (undated) RX ORDER — ROCURONIUM BROMIDE 50 MG/5 ML
SYRINGE (ML) INTRAVENOUS
Status: DISPENSED
Start: 2022-04-12

## (undated) RX ORDER — LIDOCAINE HYDROCHLORIDE 20 MG/ML
INJECTION, SOLUTION EPIDURAL; INFILTRATION; INTRACAUDAL; PERINEURAL
Status: DISPENSED
Start: 2022-04-12

## (undated) RX ORDER — CEFAZOLIN SODIUM 1 G/3ML
INJECTION, POWDER, FOR SOLUTION INTRAMUSCULAR; INTRAVENOUS
Status: DISPENSED
Start: 2022-04-12

## (undated) RX ORDER — CEFAZOLIN SODIUM/WATER 2 G/20 ML
SYRINGE (ML) INTRAVENOUS
Status: DISPENSED
Start: 2022-04-12

## (undated) RX ORDER — OXYCODONE HYDROCHLORIDE 5 MG/1
TABLET ORAL
Status: DISPENSED
Start: 2022-04-12

## (undated) RX ORDER — MEPERIDINE HYDROCHLORIDE 25 MG/ML
INJECTION INTRAMUSCULAR; INTRAVENOUS; SUBCUTANEOUS
Status: DISPENSED
Start: 2022-04-12

## (undated) RX ORDER — SCOLOPAMINE TRANSDERMAL SYSTEM 1 MG/1
PATCH, EXTENDED RELEASE TRANSDERMAL
Status: DISPENSED
Start: 2022-04-12

## (undated) RX ORDER — METRONIDAZOLE 500 MG/100ML
INJECTION, SOLUTION INTRAVENOUS
Status: DISPENSED
Start: 2022-04-12